# Patient Record
Sex: FEMALE | Race: WHITE | URBAN - METROPOLITAN AREA
[De-identification: names, ages, dates, MRNs, and addresses within clinical notes are randomized per-mention and may not be internally consistent; named-entity substitution may affect disease eponyms.]

---

## 2017-12-18 ENCOUNTER — NEW PATIENT (OUTPATIENT)
Dept: URBAN - METROPOLITAN AREA CLINIC 44 | Facility: CLINIC | Age: 77
End: 2017-12-18

## 2017-12-18 DIAGNOSIS — H33.301: ICD-10-CM

## 2017-12-18 DIAGNOSIS — H33.002: ICD-10-CM

## 2017-12-18 PROCEDURE — 99204 OFFICE O/P NEW MOD 45 MIN: CPT

## 2017-12-18 PROCEDURE — 92225 OPHTHALMOSCOPY (INITIAL): CPT

## 2017-12-18 ASSESSMENT — VISUAL ACUITY
OS_SC: 20/30+2
OD_SC: 20/30+2

## 2017-12-18 ASSESSMENT — TONOMETRY
OS_IOP_MMHG: 20
OD_IOP_MMHG: 18

## 2020-07-15 ENCOUNTER — IOP CHECK (OUTPATIENT)
Dept: URBAN - METROPOLITAN AREA CLINIC 79 | Facility: CLINIC | Age: 80
End: 2020-07-15

## 2020-07-15 DIAGNOSIS — H40.013: ICD-10-CM

## 2020-07-15 PROCEDURE — 92012 INTRM OPH EXAM EST PATIENT: CPT

## 2020-07-15 PROCEDURE — 92083 EXTENDED VISUAL FIELD XM: CPT

## 2020-07-15 ASSESSMENT — VISUAL ACUITY
OS_PH: 20/30
OS_SC: 20/40+1
OD_SC: 20/20-2

## 2020-07-29 ENCOUNTER — PROCEDURE ONLY (OUTPATIENT)
Dept: URBAN - METROPOLITAN AREA CLINIC 79 | Facility: CLINIC | Age: 80
End: 2020-07-29

## 2020-07-29 DIAGNOSIS — H26.492: ICD-10-CM

## 2020-07-29 PROCEDURE — 66821 AFTER CATARACT LASER SURGERY: CPT

## 2020-07-29 PROCEDURE — 92014 COMPRE OPH EXAM EST PT 1/>: CPT | Mod: 57

## 2020-07-29 ASSESSMENT — VISUAL ACUITY
OS_PH: 20/25
OS_SC: 20/40-1

## 2023-03-08 ENCOUNTER — ESTABLISHED COMPREHENSIVE EXAM (OUTPATIENT)
Dept: URBAN - METROPOLITAN AREA CLINIC 79 | Facility: CLINIC | Age: 83
End: 2023-03-08

## 2023-03-08 DIAGNOSIS — H40.013: ICD-10-CM

## 2023-03-08 DIAGNOSIS — H35.61: ICD-10-CM

## 2023-03-08 DIAGNOSIS — Z96.1: ICD-10-CM

## 2023-03-08 DIAGNOSIS — H31.092: ICD-10-CM

## 2023-03-08 PROCEDURE — 99214 OFFICE O/P EST MOD 30 MIN: CPT

## 2023-03-08 PROCEDURE — 92250 FUNDUS PHOTOGRAPHY W/I&R: CPT

## 2023-03-08 ASSESSMENT — VISUAL ACUITY
OS_SC: 20/40
OS_CC: 20/25
OD_SC: 20/25+2
OD_CC: 20/25

## 2023-03-08 ASSESSMENT — TONOMETRY
OS_IOP_MMHG: 18
OD_IOP_MMHG: 16

## 2024-01-01 ENCOUNTER — APPOINTMENT (EMERGENCY)
Dept: RADIOLOGY | Facility: HOSPITAL | Age: 84
DRG: 871 | End: 2024-01-01
Payer: COMMERCIAL

## 2024-01-01 ENCOUNTER — APPOINTMENT (EMERGENCY)
Dept: CT IMAGING | Facility: HOSPITAL | Age: 84
DRG: 871 | End: 2024-01-01
Payer: COMMERCIAL

## 2024-01-01 ENCOUNTER — HOSPICE ADMISSION (OUTPATIENT)
Dept: HOME HOSPICE | Facility: HOSPICE | Age: 84
End: 2024-01-01
Payer: MEDICARE

## 2024-01-01 ENCOUNTER — HOME CARE VISIT (OUTPATIENT)
Dept: HOME HEALTH SERVICES | Facility: HOME HEALTHCARE | Age: 84
End: 2024-01-01
Payer: MEDICARE

## 2024-01-01 ENCOUNTER — APPOINTMENT (INPATIENT)
Dept: RADIOLOGY | Facility: HOSPITAL | Age: 84
DRG: 871 | End: 2024-01-01
Payer: COMMERCIAL

## 2024-01-01 ENCOUNTER — HOSPITAL ENCOUNTER (INPATIENT)
Facility: HOSPITAL | Age: 84
LOS: 3 days | DRG: 871 | End: 2024-11-07
Attending: EMERGENCY MEDICINE | Admitting: STUDENT IN AN ORGANIZED HEALTH CARE EDUCATION/TRAINING PROGRAM
Payer: COMMERCIAL

## 2024-01-01 VITALS
BODY MASS INDEX: 23.29 KG/M2 | OXYGEN SATURATION: 91 % | HEART RATE: 104 BPM | RESPIRATION RATE: 36 BRPM | TEMPERATURE: 100 F | SYSTOLIC BLOOD PRESSURE: 106 MMHG | HEIGHT: 67 IN | DIASTOLIC BLOOD PRESSURE: 55 MMHG | WEIGHT: 148.37 LBS

## 2024-01-01 DIAGNOSIS — I48.91 ATRIAL FIBRILLATION, UNSPECIFIED TYPE (HCC): ICD-10-CM

## 2024-01-01 DIAGNOSIS — R29.90 STROKE-LIKE SYMPTOMS: Primary | ICD-10-CM

## 2024-01-01 DIAGNOSIS — J18.9 PNEUMONIA DUE TO INFECTIOUS ORGANISM, UNSPECIFIED LATERALITY, UNSPECIFIED PART OF LUNG: ICD-10-CM

## 2024-01-01 DIAGNOSIS — N17.9 ACUTE KIDNEY INJURY (HCC): ICD-10-CM

## 2024-01-01 DIAGNOSIS — E11.65 UNCONTROLLED DIABETES MELLITUS WITH HYPERGLYCEMIA (HCC): ICD-10-CM

## 2024-01-01 DIAGNOSIS — J96.01 ACUTE RESPIRATORY FAILURE WITH HYPOXIA (HCC): ICD-10-CM

## 2024-01-01 LAB
2HR DELTA HS TROPONIN: 1 NG/L
4HR DELTA HS TROPONIN: 25 NG/L
ANION GAP SERPL CALCULATED.3IONS-SCNC: 12 MMOL/L (ref 4–13)
ANION GAP SERPL CALCULATED.3IONS-SCNC: 18 MMOL/L (ref 4–13)
ANION GAP SERPL CALCULATED.3IONS-SCNC: 20 MMOL/L (ref 4–13)
APTT PPP: 39 SECONDS (ref 23–34)
ATRIAL RATE: 100 BPM
ATRIAL RATE: 127 BPM
B-OH-BUTYR SERPL-MCNC: 0.16 MMOL/L (ref 0.02–0.27)
BACTERIA UR CULT: ABNORMAL
BACTERIA UR QL AUTO: ABNORMAL /HPF
BASE EXCESS BLDA CALC-SCNC: -10 MMOL/L (ref -2–3)
BASE EXCESS BLDA CALC-SCNC: 0 MMOL/L (ref -2–3)
BASOPHILS # BLD MANUAL: 0 THOUSAND/UL (ref 0–0.1)
BASOPHILS NFR MAR MANUAL: 0 % (ref 0–1)
BILIRUB UR QL STRIP: NEGATIVE
BUN SERPL-MCNC: 43 MG/DL (ref 5–25)
BUN SERPL-MCNC: 45 MG/DL (ref 5–25)
BUN SERPL-MCNC: 46 MG/DL (ref 5–25)
CA-I BLD-SCNC: 1.25 MMOL/L (ref 1.12–1.32)
CA-I BLD-SCNC: 1.3 MMOL/L (ref 1.12–1.32)
CALCIUM SERPL-MCNC: 10 MG/DL (ref 8.4–10.2)
CALCIUM SERPL-MCNC: 10 MG/DL (ref 8.4–10.2)
CALCIUM SERPL-MCNC: 9.1 MG/DL (ref 8.4–10.2)
CARDIAC TROPONIN I PNL SERPL HS: 13 NG/L
CARDIAC TROPONIN I PNL SERPL HS: 14 NG/L
CARDIAC TROPONIN I PNL SERPL HS: 38 NG/L
CHLORIDE SERPL-SCNC: 95 MMOL/L (ref 96–108)
CHLORIDE SERPL-SCNC: 97 MMOL/L (ref 96–108)
CHLORIDE SERPL-SCNC: 99 MMOL/L (ref 96–108)
CHOLEST SERPL-MCNC: 68 MG/DL
CLARITY UR: ABNORMAL
CO2 SERPL-SCNC: 18 MMOL/L (ref 21–32)
CO2 SERPL-SCNC: 20 MMOL/L (ref 21–32)
CO2 SERPL-SCNC: 23 MMOL/L (ref 21–32)
COLOR UR: YELLOW
CREAT SERPL-MCNC: 1.84 MG/DL (ref 0.6–1.3)
CREAT SERPL-MCNC: 1.88 MG/DL (ref 0.6–1.3)
CREAT SERPL-MCNC: 1.93 MG/DL (ref 0.6–1.3)
DIGOXIN SERPL-MCNC: 1 NG/ML (ref 0.8–2)
EOSINOPHIL # BLD MANUAL: 0 THOUSAND/UL (ref 0–0.4)
EOSINOPHIL NFR BLD MANUAL: 0 % (ref 0–6)
ERYTHROCYTE [DISTWIDTH] IN BLOOD BY AUTOMATED COUNT: 13.9 % (ref 11.6–15.1)
ERYTHROCYTE [DISTWIDTH] IN BLOOD BY AUTOMATED COUNT: 14.3 % (ref 11.6–15.1)
ERYTHROCYTE [DISTWIDTH] IN BLOOD BY AUTOMATED COUNT: 14.3 % (ref 11.6–15.1)
EST. AVERAGE GLUCOSE BLD GHB EST-MCNC: 186 MG/DL
FIO2 GAS DIL.REBREATH: 40 L
FLUAV RNA RESP QL NAA+PROBE: NEGATIVE
FLUBV RNA RESP QL NAA+PROBE: NEGATIVE
GFR SERPL CREATININE-BSD FRML MDRD: 23 ML/MIN/1.73SQ M
GFR SERPL CREATININE-BSD FRML MDRD: 24 ML/MIN/1.73SQ M
GFR SERPL CREATININE-BSD FRML MDRD: 25 ML/MIN/1.73SQ M
GLUCOSE SERPL-MCNC: 250 MG/DL (ref 65–140)
GLUCOSE SERPL-MCNC: 295 MG/DL (ref 65–140)
GLUCOSE SERPL-MCNC: 322 MG/DL (ref 65–140)
GLUCOSE SERPL-MCNC: 323 MG/DL (ref 65–140)
GLUCOSE SERPL-MCNC: 380 MG/DL (ref 65–140)
GLUCOSE SERPL-MCNC: 387 MG/DL (ref 65–140)
GLUCOSE SERPL-MCNC: 447 MG/DL (ref 65–140)
GLUCOSE SERPL-MCNC: 519 MG/DL (ref 65–140)
GLUCOSE SERPL-MCNC: 521 MG/DL (ref 65–140)
GLUCOSE SERPL-MCNC: >600 MG/DL (ref 65–140)
GLUCOSE UR STRIP-MCNC: ABNORMAL MG/DL
HBA1C MFR BLD: 8.1 %
HCO3 BLDA-SCNC: 12.8 MMOL/L (ref 22–28)
HCO3 BLDA-SCNC: 22.3 MMOL/L (ref 24–30)
HCT VFR BLD AUTO: 39.3 % (ref 34.8–46.1)
HCT VFR BLD AUTO: 40.2 % (ref 34.8–46.1)
HCT VFR BLD AUTO: 45.2 % (ref 34.8–46.1)
HCT VFR BLD CALC: 36 % (ref 34.8–46.1)
HCT VFR BLD CALC: 40 % (ref 34.8–46.1)
HDLC SERPL-MCNC: 36 MG/DL
HGB BLD-MCNC: 12.8 G/DL (ref 11.5–15.4)
HGB BLD-MCNC: 13.8 G/DL (ref 11.5–15.4)
HGB BLD-MCNC: 14.2 G/DL (ref 11.5–15.4)
HGB BLDA-MCNC: 12.2 G/DL (ref 11.5–15.4)
HGB BLDA-MCNC: 13.6 G/DL (ref 11.5–15.4)
HGB UR QL STRIP.AUTO: ABNORMAL
INR PPP: 2 (ref 0.85–1.19)
KETONES UR STRIP-MCNC: ABNORMAL MG/DL
L PNEUMO1 AG UR QL IA.RAPID: NEGATIVE
LACTATE SERPL-SCNC: 7.9 MMOL/L (ref 0.5–2)
LACTATE SERPL-SCNC: 9.3 MMOL/L (ref 0.5–2)
LDLC SERPL CALC-MCNC: 5 MG/DL (ref 0–100)
LEUKOCYTE ESTERASE UR QL STRIP: ABNORMAL
LYMPHOCYTES # BLD AUTO: 0.31 THOUSAND/UL (ref 0.6–4.47)
LYMPHOCYTES # BLD AUTO: 10 % (ref 14–44)
MAGNESIUM SERPL-MCNC: 1.7 MG/DL (ref 1.9–2.7)
MAGNESIUM SERPL-MCNC: 2 MG/DL (ref 1.9–2.7)
MAGNESIUM SERPL-MCNC: 2 MG/DL (ref 1.9–2.7)
MCH RBC QN AUTO: 29.8 PG (ref 26.8–34.3)
MCH RBC QN AUTO: 30.5 PG (ref 26.8–34.3)
MCH RBC QN AUTO: 31 PG (ref 26.8–34.3)
MCHC RBC AUTO-ENTMCNC: 31.4 G/DL (ref 31.4–37.4)
MCHC RBC AUTO-ENTMCNC: 32.6 G/DL (ref 31.4–37.4)
MCHC RBC AUTO-ENTMCNC: 34.3 G/DL (ref 31.4–37.4)
MCV RBC AUTO: 90 FL (ref 82–98)
MCV RBC AUTO: 94 FL (ref 82–98)
MCV RBC AUTO: 95 FL (ref 82–98)
MONOCYTES # BLD AUTO: 0.03 THOUSAND/UL (ref 0–1.22)
MONOCYTES NFR BLD: 1 % (ref 4–12)
NEUTROPHILS # BLD MANUAL: 2.77 THOUSAND/UL (ref 1.85–7.62)
NEUTS SEG NFR BLD AUTO: 89 % (ref 43–75)
NITRITE UR QL STRIP: NEGATIVE
NON-SQ EPI CELLS URNS QL MICRO: ABNORMAL /HPF
OSMOLALITY UR/SERPL-RTO: 322 MMOL/KG (ref 282–298)
OSMOLALITY UR: 494 MMOL/KG
PCO2 BLD: 13 MMOL/L (ref 21–32)
PCO2 BLD: 20.2 MM HG (ref 36–44)
PCO2 BLD: 23 MMOL/L (ref 21–32)
PCO2 BLD: 29.7 MM HG (ref 42–50)
PH BLD: 7.41 [PH] (ref 7.35–7.45)
PH BLD: 7.49 [PH] (ref 7.3–7.4)
PH UR STRIP.AUTO: 5.5 [PH]
PHOSPHATE SERPL-MCNC: 2 MG/DL (ref 2.3–4.1)
PLATELET # BLD AUTO: 161 THOUSANDS/UL (ref 149–390)
PLATELET # BLD AUTO: 181 THOUSANDS/UL (ref 149–390)
PLATELET # BLD AUTO: 218 THOUSANDS/UL (ref 149–390)
PLATELET BLD QL SMEAR: ADEQUATE
PMV BLD AUTO: 11 FL (ref 8.9–12.7)
PMV BLD AUTO: 11 FL (ref 8.9–12.7)
PMV BLD AUTO: 11.3 FL (ref 8.9–12.7)
PO2 BLD: 39 MM HG (ref 35–45)
PO2 BLD: 84 MM HG (ref 75–129)
POTASSIUM BLD-SCNC: 3.9 MMOL/L (ref 3.5–5.3)
POTASSIUM BLD-SCNC: 4.9 MMOL/L (ref 3.5–5.3)
POTASSIUM SERPL-SCNC: 3.6 MMOL/L (ref 3.5–5.3)
POTASSIUM SERPL-SCNC: 4.5 MMOL/L (ref 3.5–5.3)
POTASSIUM SERPL-SCNC: 4.8 MMOL/L (ref 3.5–5.3)
PROCALCITONIN SERPL-MCNC: 11.69 NG/ML
PROCALCITONIN SERPL-MCNC: 9.13 NG/ML
PROT UR STRIP-MCNC: ABNORMAL MG/DL
PROTHROMBIN TIME: 23.1 SECONDS (ref 12.3–15)
QRS AXIS: 90 DEGREES
QRS AXIS: 92 DEGREES
QRSD INTERVAL: 92 MS
QRSD INTERVAL: 98 MS
QT INTERVAL: 274 MS
QT INTERVAL: 344 MS
QTC INTERVAL: 405 MS
QTC INTERVAL: 511 MS
RBC # BLD AUTO: 4.2 MILLION/UL (ref 3.81–5.12)
RBC # BLD AUTO: 4.45 MILLION/UL (ref 3.81–5.12)
RBC # BLD AUTO: 4.77 MILLION/UL (ref 3.81–5.12)
RBC #/AREA URNS AUTO: ABNORMAL /HPF
RBC MORPH BLD: NORMAL
RSV RNA RESP QL NAA+PROBE: NEGATIVE
S PNEUM AG UR QL: NEGATIVE
SAO2 % BLD FROM PO2: 79 % (ref 60–85)
SAO2 % BLD FROM PO2: 97 % (ref 60–85)
SARS-COV-2 RNA RESP QL NAA+PROBE: NEGATIVE
SODIUM 24H UR-SCNC: 25 MOL/L
SODIUM BLD-SCNC: 133 MMOL/L (ref 136–145)
SODIUM BLD-SCNC: 134 MMOL/L (ref 136–145)
SODIUM SERPL-SCNC: 130 MMOL/L (ref 135–147)
SODIUM SERPL-SCNC: 135 MMOL/L (ref 135–147)
SODIUM SERPL-SCNC: 137 MMOL/L (ref 135–147)
SP GR UR STRIP.AUTO: 1.02 (ref 1–1.03)
SPECIMEN SOURCE: ABNORMAL
SPECIMEN SOURCE: ABNORMAL
T WAVE AXIS: -57 DEGREES
T WAVE AXIS: -65 DEGREES
TRIGL SERPL-MCNC: 135 MG/DL
TSH SERPL DL<=0.05 MIU/L-ACNC: 3.03 UIU/ML (ref 0.45–4.5)
URATE SERPL-MCNC: 8.8 MG/DL (ref 2–7.5)
UROBILINOGEN UR STRIP-ACNC: <2 MG/DL
VENTRICULAR RATE: 132 BPM
VENTRICULAR RATE: 133 BPM
WBC # BLD AUTO: 16.53 THOUSAND/UL (ref 4.31–10.16)
WBC # BLD AUTO: 20.96 THOUSAND/UL (ref 4.31–10.16)
WBC # BLD AUTO: 3.11 THOUSAND/UL (ref 4.31–10.16)
WBC #/AREA URNS AUTO: ABNORMAL /HPF

## 2024-01-01 PROCEDURE — 71045 X-RAY EXAM CHEST 1 VIEW: CPT

## 2024-01-01 PROCEDURE — 82948 REAGENT STRIP/BLOOD GLUCOSE: CPT

## 2024-01-01 PROCEDURE — 0241U HB NFCT DS VIR RESP RNA 4 TRGT: CPT | Performed by: INTERNAL MEDICINE

## 2024-01-01 PROCEDURE — 99285 EMERGENCY DEPT VISIT HI MDM: CPT

## 2024-01-01 PROCEDURE — 87449 NOS EACH ORGANISM AG IA: CPT | Performed by: INTERNAL MEDICINE

## 2024-01-01 PROCEDURE — 82947 ASSAY GLUCOSE BLOOD QUANT: CPT

## 2024-01-01 PROCEDURE — 84443 ASSAY THYROID STIM HORMONE: CPT | Performed by: INTERNAL MEDICINE

## 2024-01-01 PROCEDURE — 94760 N-INVAS EAR/PLS OXIMETRY 1: CPT

## 2024-01-01 PROCEDURE — 70498 CT ANGIOGRAPHY NECK: CPT

## 2024-01-01 PROCEDURE — 84145 PROCALCITONIN (PCT): CPT | Performed by: EMERGENCY MEDICINE

## 2024-01-01 PROCEDURE — 84300 ASSAY OF URINE SODIUM: CPT | Performed by: NURSE PRACTITIONER

## 2024-01-01 PROCEDURE — 82803 BLOOD GASES ANY COMBINATION: CPT

## 2024-01-01 PROCEDURE — 87086 URINE CULTURE/COLONY COUNT: CPT | Performed by: NURSE PRACTITIONER

## 2024-01-01 PROCEDURE — 99239 HOSP IP/OBS DSCHRG MGMT >30: CPT

## 2024-01-01 PROCEDURE — 83605 ASSAY OF LACTIC ACID: CPT | Performed by: INTERNAL MEDICINE

## 2024-01-01 PROCEDURE — 85014 HEMATOCRIT: CPT

## 2024-01-01 PROCEDURE — 82330 ASSAY OF CALCIUM: CPT

## 2024-01-01 PROCEDURE — 84145 PROCALCITONIN (PCT): CPT | Performed by: INTERNAL MEDICINE

## 2024-01-01 PROCEDURE — 80162 ASSAY OF DIGOXIN TOTAL: CPT | Performed by: NURSE PRACTITIONER

## 2024-01-01 PROCEDURE — 84295 ASSAY OF SERUM SODIUM: CPT

## 2024-01-01 PROCEDURE — 83930 ASSAY OF BLOOD OSMOLALITY: CPT | Performed by: INTERNAL MEDICINE

## 2024-01-01 PROCEDURE — 85027 COMPLETE CBC AUTOMATED: CPT | Performed by: NURSE PRACTITIONER

## 2024-01-01 PROCEDURE — 82010 KETONE BODYS QUAN: CPT | Performed by: EMERGENCY MEDICINE

## 2024-01-01 PROCEDURE — 83935 ASSAY OF URINE OSMOLALITY: CPT | Performed by: NURSE PRACTITIONER

## 2024-01-01 PROCEDURE — 84550 ASSAY OF BLOOD/URIC ACID: CPT | Performed by: INTERNAL MEDICINE

## 2024-01-01 PROCEDURE — 84132 ASSAY OF SERUM POTASSIUM: CPT

## 2024-01-01 PROCEDURE — 84484 ASSAY OF TROPONIN QUANT: CPT | Performed by: NURSE PRACTITIONER

## 2024-01-01 PROCEDURE — 99223 1ST HOSP IP/OBS HIGH 75: CPT | Performed by: INTERNAL MEDICINE

## 2024-01-01 PROCEDURE — 85027 COMPLETE CBC AUTOMATED: CPT | Performed by: INTERNAL MEDICINE

## 2024-01-01 PROCEDURE — 87186 SC STD MICRODIL/AGAR DIL: CPT | Performed by: NURSE PRACTITIONER

## 2024-01-01 PROCEDURE — 85007 BL SMEAR W/DIFF WBC COUNT: CPT | Performed by: INTERNAL MEDICINE

## 2024-01-01 PROCEDURE — 83735 ASSAY OF MAGNESIUM: CPT | Performed by: NURSE PRACTITIONER

## 2024-01-01 PROCEDURE — 36415 COLL VENOUS BLD VENIPUNCTURE: CPT | Performed by: EMERGENCY MEDICINE

## 2024-01-01 PROCEDURE — 80061 LIPID PANEL: CPT | Performed by: INTERNAL MEDICINE

## 2024-01-01 PROCEDURE — 93005 ELECTROCARDIOGRAM TRACING: CPT

## 2024-01-01 PROCEDURE — 87077 CULTURE AEROBIC IDENTIFY: CPT | Performed by: NURSE PRACTITIONER

## 2024-01-01 PROCEDURE — 84484 ASSAY OF TROPONIN QUANT: CPT | Performed by: INTERNAL MEDICINE

## 2024-01-01 PROCEDURE — 36600 WITHDRAWAL OF ARTERIAL BLOOD: CPT

## 2024-01-01 PROCEDURE — 99285 EMERGENCY DEPT VISIT HI MDM: CPT | Performed by: EMERGENCY MEDICINE

## 2024-01-01 PROCEDURE — 84100 ASSAY OF PHOSPHORUS: CPT | Performed by: NURSE PRACTITIONER

## 2024-01-01 PROCEDURE — 85730 THROMBOPLASTIN TIME PARTIAL: CPT | Performed by: EMERGENCY MEDICINE

## 2024-01-01 PROCEDURE — 83735 ASSAY OF MAGNESIUM: CPT | Performed by: INTERNAL MEDICINE

## 2024-01-01 PROCEDURE — 83036 HEMOGLOBIN GLYCOSYLATED A1C: CPT | Performed by: INTERNAL MEDICINE

## 2024-01-01 PROCEDURE — 99232 SBSQ HOSP IP/OBS MODERATE 35: CPT | Performed by: INTERNAL MEDICINE

## 2024-01-01 PROCEDURE — 81001 URINALYSIS AUTO W/SCOPE: CPT | Performed by: NURSE PRACTITIONER

## 2024-01-01 PROCEDURE — 94002 VENT MGMT INPAT INIT DAY: CPT

## 2024-01-01 PROCEDURE — 85610 PROTHROMBIN TIME: CPT | Performed by: EMERGENCY MEDICINE

## 2024-01-01 PROCEDURE — 93010 ELECTROCARDIOGRAM REPORT: CPT | Performed by: INTERNAL MEDICINE

## 2024-01-01 PROCEDURE — 80048 BASIC METABOLIC PNL TOTAL CA: CPT | Performed by: EMERGENCY MEDICINE

## 2024-01-01 PROCEDURE — 87040 BLOOD CULTURE FOR BACTERIA: CPT | Performed by: INTERNAL MEDICINE

## 2024-01-01 PROCEDURE — 99291 CRITICAL CARE FIRST HOUR: CPT | Performed by: INTERNAL MEDICINE

## 2024-01-01 PROCEDURE — 80048 BASIC METABOLIC PNL TOTAL CA: CPT | Performed by: INTERNAL MEDICINE

## 2024-01-01 PROCEDURE — 84484 ASSAY OF TROPONIN QUANT: CPT | Performed by: EMERGENCY MEDICINE

## 2024-01-01 PROCEDURE — 70496 CT ANGIOGRAPHY HEAD: CPT

## 2024-01-01 PROCEDURE — 85027 COMPLETE CBC AUTOMATED: CPT | Performed by: EMERGENCY MEDICINE

## 2024-01-01 PROCEDURE — 80048 BASIC METABOLIC PNL TOTAL CA: CPT | Performed by: NURSE PRACTITIONER

## 2024-01-01 RX ORDER — DILTIAZEM HYDROCHLORIDE 5 MG/ML
15 INJECTION INTRAVENOUS ONCE
Status: COMPLETED | OUTPATIENT
Start: 2024-01-01 | End: 2024-01-01

## 2024-01-01 RX ORDER — INSULIN LISPRO 100 [IU]/ML
1-5 INJECTION, SOLUTION INTRAVENOUS; SUBCUTANEOUS EVERY 6 HOURS SCHEDULED
Status: DISCONTINUED | OUTPATIENT
Start: 2024-01-01 | End: 2024-01-01

## 2024-01-01 RX ORDER — DIGOXIN 125 MCG
187.5 TABLET ORAL DAILY
Status: DISCONTINUED | OUTPATIENT
Start: 2024-01-01 | End: 2024-01-01

## 2024-01-01 RX ORDER — ATORVASTATIN CALCIUM 40 MG/1
40 TABLET, FILM COATED ORAL EVERY EVENING
Status: DISCONTINUED | OUTPATIENT
Start: 2024-01-01 | End: 2024-01-01

## 2024-01-01 RX ORDER — INSULIN LISPRO 100 [IU]/ML
1-6 INJECTION, SOLUTION INTRAVENOUS; SUBCUTANEOUS
Status: DISCONTINUED | OUTPATIENT
Start: 2024-01-01 | End: 2024-01-01

## 2024-01-01 RX ORDER — ASPIRIN 81 MG/1
81 TABLET, CHEWABLE ORAL DAILY
Status: DISCONTINUED | OUTPATIENT
Start: 2024-01-01 | End: 2024-01-01

## 2024-01-01 RX ORDER — SODIUM CHLORIDE, SODIUM GLUCONATE, SODIUM ACETATE, POTASSIUM CHLORIDE, MAGNESIUM CHLORIDE, SODIUM PHOSPHATE, DIBASIC, AND POTASSIUM PHOSPHATE .53; .5; .37; .037; .03; .012; .00082 G/100ML; G/100ML; G/100ML; G/100ML; G/100ML; G/100ML; G/100ML
75 INJECTION, SOLUTION INTRAVENOUS CONTINUOUS
Status: DISCONTINUED | OUTPATIENT
Start: 2024-01-01 | End: 2024-01-01

## 2024-01-01 RX ORDER — CEFTRIAXONE 2 G/50ML
2000 INJECTION, SOLUTION INTRAVENOUS ONCE
Status: COMPLETED | OUTPATIENT
Start: 2024-01-01 | End: 2024-01-01

## 2024-01-01 RX ORDER — SODIUM CHLORIDE 9 MG/ML
75 INJECTION, SOLUTION INTRAVENOUS CONTINUOUS
Status: DISCONTINUED | OUTPATIENT
Start: 2024-01-01 | End: 2024-01-01

## 2024-01-01 RX ORDER — SODIUM CHLORIDE, SODIUM GLUCONATE, SODIUM ACETATE, POTASSIUM CHLORIDE, MAGNESIUM CHLORIDE, SODIUM PHOSPHATE, DIBASIC, AND POTASSIUM PHOSPHATE .53; .5; .37; .037; .03; .012; .00082 G/100ML; G/100ML; G/100ML; G/100ML; G/100ML; G/100ML; G/100ML
1000 INJECTION, SOLUTION INTRAVENOUS ONCE
Status: COMPLETED | OUTPATIENT
Start: 2024-01-01 | End: 2024-01-01

## 2024-01-01 RX ORDER — HYDROMORPHONE HCL/PF 1 MG/ML
0.3 SYRINGE (ML) INJECTION
Status: DISCONTINUED | OUTPATIENT
Start: 2024-01-01 | End: 2024-01-01 | Stop reason: HOSPADM

## 2024-01-01 RX ORDER — ALBUMIN HUMAN 50 G/1000ML
25 SOLUTION INTRAVENOUS ONCE
Status: COMPLETED | OUTPATIENT
Start: 2024-01-01 | End: 2024-01-01

## 2024-01-01 RX ORDER — VANCOMYCIN HYDROCHLORIDE 750 MG/150ML
12.5 INJECTION, SOLUTION INTRAVENOUS ONCE AS NEEDED
Status: DISCONTINUED | OUTPATIENT
Start: 2024-01-01 | End: 2024-01-01

## 2024-01-01 RX ORDER — CEFEPIME HYDROCHLORIDE 2 G/50ML
2000 INJECTION, SOLUTION INTRAVENOUS EVERY 24 HOURS
Status: DISCONTINUED | OUTPATIENT
Start: 2024-01-01 | End: 2024-01-01

## 2024-01-01 RX ORDER — LORAZEPAM 2 MG/ML
1 INJECTION INTRAMUSCULAR
Status: DISCONTINUED | OUTPATIENT
Start: 2024-01-01 | End: 2024-01-01 | Stop reason: HOSPADM

## 2024-01-01 RX ORDER — CEFTRIAXONE 1 G/50ML
1000 INJECTION, SOLUTION INTRAVENOUS EVERY 24 HOURS
Status: DISCONTINUED | OUTPATIENT
Start: 2024-01-01 | End: 2024-01-01

## 2024-01-01 RX ORDER — HALOPERIDOL 5 MG/ML
0.5 INJECTION INTRAMUSCULAR EVERY 2 HOUR PRN
Status: DISCONTINUED | OUTPATIENT
Start: 2024-01-01 | End: 2024-01-01 | Stop reason: HOSPADM

## 2024-01-01 RX ORDER — HYDROMORPHONE HCL/PF 1 MG/ML
0.5 SYRINGE (ML) INJECTION ONCE AS NEEDED
Status: COMPLETED | OUTPATIENT
Start: 2024-01-01 | End: 2024-01-01

## 2024-01-01 RX ORDER — INSULIN LISPRO 100 [IU]/ML
10 INJECTION, SOLUTION INTRAVENOUS; SUBCUTANEOUS ONCE
Status: DISCONTINUED | OUTPATIENT
Start: 2024-01-01 | End: 2024-01-01

## 2024-01-01 RX ORDER — ACETAMINOPHEN 10 MG/ML
1000 INJECTION, SOLUTION INTRAVENOUS EVERY 6 HOURS PRN
Status: DISCONTINUED | OUTPATIENT
Start: 2024-01-01 | End: 2024-01-01

## 2024-01-01 RX ORDER — ACETAMINOPHEN 10 MG/ML
1000 INJECTION, SOLUTION INTRAVENOUS EVERY 8 HOURS PRN
Status: DISCONTINUED | OUTPATIENT
Start: 2024-01-01 | End: 2024-01-01

## 2024-01-01 RX ORDER — MAGNESIUM SULFATE 1 G/100ML
1 INJECTION INTRAVENOUS ONCE
Status: COMPLETED | OUTPATIENT
Start: 2024-01-01 | End: 2024-01-01

## 2024-01-01 RX ORDER — HYDROMORPHONE HCL/PF 1 MG/ML
0.3 SYRINGE (ML) INJECTION EVERY 2 HOUR PRN
Status: DISCONTINUED | OUTPATIENT
Start: 2024-01-01 | End: 2024-01-01

## 2024-01-01 RX ORDER — ALBUMIN HUMAN 50 G/1000ML
SOLUTION INTRAVENOUS
Status: COMPLETED
Start: 2024-01-01 | End: 2024-01-01

## 2024-01-01 RX ADMIN — DILTIAZEM HYDROCHLORIDE 15 MG: 5 INJECTION INTRAVENOUS at 21:12

## 2024-01-01 RX ADMIN — VANCOMYCIN HYDROCHLORIDE 1750 MG: 1 INJECTION, POWDER, LYOPHILIZED, FOR SOLUTION INTRAVENOUS at 04:02

## 2024-01-01 RX ADMIN — AMIODARONE HYDROCHLORIDE 1 MG/MIN: 50 INJECTION, SOLUTION INTRAVENOUS at 03:41

## 2024-01-01 RX ADMIN — HYDROMORPHONE HYDROCHLORIDE 0.3 MG: 1 INJECTION, SOLUTION INTRAMUSCULAR; INTRAVENOUS; SUBCUTANEOUS at 05:35

## 2024-01-01 RX ADMIN — DEXTROSE 150 MG: 50 INJECTION, SOLUTION INTRAVENOUS at 03:27

## 2024-01-01 RX ADMIN — HYDROMORPHONE HYDROCHLORIDE 0.3 MG: 1 INJECTION, SOLUTION INTRAMUSCULAR; INTRAVENOUS; SUBCUTANEOUS at 02:57

## 2024-01-01 RX ADMIN — HYDROMORPHONE HYDROCHLORIDE 0.3 MG: 1 INJECTION, SOLUTION INTRAMUSCULAR; INTRAVENOUS; SUBCUTANEOUS at 11:09

## 2024-01-01 RX ADMIN — HALOPERIDOL LACTATE 0.5 MG: 5 INJECTION, SOLUTION INTRAMUSCULAR at 09:24

## 2024-01-01 RX ADMIN — ACETAMINOPHEN 1000 MG: 10 INJECTION INTRAVENOUS at 03:13

## 2024-01-01 RX ADMIN — HYDROMORPHONE HYDROCHLORIDE 0.3 MG: 1 INJECTION, SOLUTION INTRAMUSCULAR; INTRAVENOUS; SUBCUTANEOUS at 23:50

## 2024-01-01 RX ADMIN — INSULIN LISPRO 6 UNITS: 100 INJECTION, SOLUTION INTRAVENOUS; SUBCUTANEOUS at 00:14

## 2024-01-01 RX ADMIN — LORAZEPAM 1 MG: 2 INJECTION INTRAMUSCULAR; INTRAVENOUS at 18:00

## 2024-01-01 RX ADMIN — LORAZEPAM 1 MG: 2 INJECTION INTRAMUSCULAR; INTRAVENOUS at 14:59

## 2024-01-01 RX ADMIN — CEFTRIAXONE 2000 MG: 2 INJECTION, SOLUTION INTRAVENOUS at 00:01

## 2024-01-01 RX ADMIN — SODIUM CHLORIDE 75 ML/HR: 0.9 INJECTION, SOLUTION INTRAVENOUS at 23:55

## 2024-01-01 RX ADMIN — SODIUM CHLORIDE, SODIUM GLUCONATE, SODIUM ACETATE, POTASSIUM CHLORIDE, MAGNESIUM CHLORIDE, SODIUM PHOSPHATE, DIBASIC, AND POTASSIUM PHOSPHATE 1000 ML: .53; .5; .37; .037; .03; .012; .00082 INJECTION, SOLUTION INTRAVENOUS at 05:22

## 2024-01-01 RX ADMIN — SODIUM CHLORIDE 500 ML: 0.9 INJECTION, SOLUTION INTRAVENOUS at 21:14

## 2024-01-01 RX ADMIN — LORAZEPAM 1 MG: 2 INJECTION INTRAMUSCULAR; INTRAVENOUS at 09:38

## 2024-01-01 RX ADMIN — HALOPERIDOL LACTATE 0.5 MG: 5 INJECTION, SOLUTION INTRAMUSCULAR at 17:57

## 2024-01-01 RX ADMIN — MAGNESIUM SULFATE HEPTAHYDRATE 1 G: 1 INJECTION, SOLUTION INTRAVENOUS at 21:17

## 2024-01-01 RX ADMIN — HYDROMORPHONE HYDROCHLORIDE 0.3 MG: 1 INJECTION, SOLUTION INTRAMUSCULAR; INTRAVENOUS; SUBCUTANEOUS at 10:25

## 2024-01-01 RX ADMIN — HYDROMORPHONE HYDROCHLORIDE 0.3 MG: 1 INJECTION, SOLUTION INTRAMUSCULAR; INTRAVENOUS; SUBCUTANEOUS at 13:09

## 2024-01-01 RX ADMIN — NOREPINEPHRINE BITARTRATE 4 MCG/MIN: 1 SOLUTION INTRAVENOUS at 06:18

## 2024-01-01 RX ADMIN — INSULIN LISPRO 3 UNITS: 100 INJECTION, SOLUTION INTRAVENOUS; SUBCUTANEOUS at 03:58

## 2024-01-01 RX ADMIN — SODIUM CHLORIDE, SODIUM GLUCONATE, SODIUM ACETATE, POTASSIUM CHLORIDE, MAGNESIUM CHLORIDE, SODIUM PHOSPHATE, DIBASIC, AND POTASSIUM PHOSPHATE 1000 ML: .53; .5; .37; .037; .03; .012; .00082 INJECTION, SOLUTION INTRAVENOUS at 03:06

## 2024-01-01 RX ADMIN — SODIUM CHLORIDE, SODIUM GLUCONATE, SODIUM ACETATE, POTASSIUM CHLORIDE, MAGNESIUM CHLORIDE, SODIUM PHOSPHATE, DIBASIC, AND POTASSIUM PHOSPHATE 75 ML/HR: .53; .5; .37; .037; .03; .012; .00082 INJECTION, SOLUTION INTRAVENOUS at 04:09

## 2024-01-01 RX ADMIN — INSULIN LISPRO 2 UNITS: 100 INJECTION, SOLUTION INTRAVENOUS; SUBCUTANEOUS at 05:59

## 2024-01-01 RX ADMIN — ALBUMIN (HUMAN) 25 G: 12.5 INJECTION, SOLUTION INTRAVENOUS at 05:21

## 2024-01-01 RX ADMIN — HYDROMORPHONE HYDROCHLORIDE 0.3 MG: 1 INJECTION, SOLUTION INTRAMUSCULAR; INTRAVENOUS; SUBCUTANEOUS at 20:26

## 2024-01-01 RX ADMIN — HYDROMORPHONE HYDROCHLORIDE 0.3 MG: 1 INJECTION, SOLUTION INTRAMUSCULAR; INTRAVENOUS; SUBCUTANEOUS at 00:31

## 2024-01-01 RX ADMIN — HYDROMORPHONE HYDROCHLORIDE 0.3 MG: 1 INJECTION, SOLUTION INTRAMUSCULAR; INTRAVENOUS; SUBCUTANEOUS at 13:57

## 2024-01-01 RX ADMIN — IOHEXOL 75 ML: 350 INJECTION, SOLUTION INTRAVENOUS at 20:18

## 2024-01-01 RX ADMIN — HYDROMORPHONE HYDROCHLORIDE 0.3 MG: 1 INJECTION, SOLUTION INTRAMUSCULAR; INTRAVENOUS; SUBCUTANEOUS at 17:53

## 2024-01-01 RX ADMIN — HYDROMORPHONE HYDROCHLORIDE 0.5 MG: 1 INJECTION, SOLUTION INTRAMUSCULAR; INTRAVENOUS; SUBCUTANEOUS at 09:15

## 2024-01-01 RX ADMIN — LORAZEPAM 1 MG: 2 INJECTION INTRAMUSCULAR; INTRAVENOUS at 10:32

## 2024-01-01 RX ADMIN — ALBUMIN HUMAN 25 G: 50 SOLUTION INTRAVENOUS at 05:21

## 2024-02-24 ENCOUNTER — HOSPITAL ENCOUNTER (INPATIENT)
Dept: HOSPITAL 99 - 2 NORTH | Age: 84
LOS: 2 days | Discharge: NURSING, REHAB OR LONG-TERM CARE | DRG: 641 | End: 2024-02-26
Payer: COMMERCIAL

## 2024-02-24 VITALS — SYSTOLIC BLOOD PRESSURE: 115 MMHG | DIASTOLIC BLOOD PRESSURE: 100 MMHG | RESPIRATION RATE: 15 BRPM

## 2024-02-24 VITALS — DIASTOLIC BLOOD PRESSURE: 91 MMHG | SYSTOLIC BLOOD PRESSURE: 159 MMHG

## 2024-02-24 VITALS — RESPIRATION RATE: 11 BRPM

## 2024-02-24 VITALS — SYSTOLIC BLOOD PRESSURE: 123 MMHG | RESPIRATION RATE: 20 BRPM | DIASTOLIC BLOOD PRESSURE: 74 MMHG

## 2024-02-24 VITALS — BODY MASS INDEX: 27.8 KG/M2 | BODY MASS INDEX: 27 KG/M2 | BODY MASS INDEX: 28 KG/M2 | BODY MASS INDEX: 28.4 KG/M2

## 2024-02-24 VITALS — RESPIRATION RATE: 16 BRPM

## 2024-02-24 VITALS — HEART RATE: 85 BPM | DIASTOLIC BLOOD PRESSURE: 65 MMHG | RESPIRATION RATE: 18 BRPM | SYSTOLIC BLOOD PRESSURE: 119 MMHG

## 2024-02-24 VITALS — HEART RATE: 90 BPM | DIASTOLIC BLOOD PRESSURE: 65 MMHG | SYSTOLIC BLOOD PRESSURE: 119 MMHG

## 2024-02-24 VITALS — RESPIRATION RATE: 16 BRPM | DIASTOLIC BLOOD PRESSURE: 74 MMHG | SYSTOLIC BLOOD PRESSURE: 118 MMHG

## 2024-02-24 VITALS — DIASTOLIC BLOOD PRESSURE: 53 MMHG | SYSTOLIC BLOOD PRESSURE: 109 MMHG | RESPIRATION RATE: 18 BRPM

## 2024-02-24 VITALS — HEART RATE: 85 BPM | SYSTOLIC BLOOD PRESSURE: 119 MMHG | DIASTOLIC BLOOD PRESSURE: 66 MMHG

## 2024-02-24 VITALS — RESPIRATION RATE: 12 BRPM

## 2024-02-24 VITALS — RESPIRATION RATE: 14 BRPM

## 2024-02-24 VITALS — DIASTOLIC BLOOD PRESSURE: 74 MMHG | RESPIRATION RATE: 16 BRPM | SYSTOLIC BLOOD PRESSURE: 118 MMHG

## 2024-02-24 VITALS — DIASTOLIC BLOOD PRESSURE: 87 MMHG | RESPIRATION RATE: 20 BRPM | SYSTOLIC BLOOD PRESSURE: 137 MMHG

## 2024-02-24 VITALS — SYSTOLIC BLOOD PRESSURE: 159 MMHG | RESPIRATION RATE: 16 BRPM | DIASTOLIC BLOOD PRESSURE: 91 MMHG

## 2024-02-24 VITALS — RESPIRATION RATE: 15 BRPM | SYSTOLIC BLOOD PRESSURE: 134 MMHG | DIASTOLIC BLOOD PRESSURE: 64 MMHG

## 2024-02-24 VITALS — SYSTOLIC BLOOD PRESSURE: 126 MMHG | RESPIRATION RATE: 18 BRPM | DIASTOLIC BLOOD PRESSURE: 67 MMHG

## 2024-02-24 VITALS — RESPIRATION RATE: 18 BRPM

## 2024-02-24 VITALS — RESPIRATION RATE: 23 BRPM

## 2024-02-24 VITALS — RESPIRATION RATE: 21 BRPM

## 2024-02-24 VITALS — RESPIRATION RATE: 19 BRPM

## 2024-02-24 VITALS — RESPIRATION RATE: 13 BRPM

## 2024-02-24 VITALS — RESPIRATION RATE: 18 BRPM | DIASTOLIC BLOOD PRESSURE: 70 MMHG | SYSTOLIC BLOOD PRESSURE: 123 MMHG

## 2024-02-24 DIAGNOSIS — T50.2X5A: ICD-10-CM

## 2024-02-24 DIAGNOSIS — E11.36: ICD-10-CM

## 2024-02-24 DIAGNOSIS — F03.90: ICD-10-CM

## 2024-02-24 DIAGNOSIS — I12.9: ICD-10-CM

## 2024-02-24 DIAGNOSIS — I27.20: ICD-10-CM

## 2024-02-24 DIAGNOSIS — E86.1: ICD-10-CM

## 2024-02-24 DIAGNOSIS — E87.1: Primary | ICD-10-CM

## 2024-02-24 DIAGNOSIS — Z79.01: ICD-10-CM

## 2024-02-24 DIAGNOSIS — E87.6: ICD-10-CM

## 2024-02-24 DIAGNOSIS — E86.0: ICD-10-CM

## 2024-02-24 DIAGNOSIS — E87.3: ICD-10-CM

## 2024-02-24 DIAGNOSIS — I48.21: ICD-10-CM

## 2024-02-24 DIAGNOSIS — E22.2: ICD-10-CM

## 2024-02-24 DIAGNOSIS — E11.22: ICD-10-CM

## 2024-02-24 DIAGNOSIS — E03.9: ICD-10-CM

## 2024-02-24 DIAGNOSIS — N18.2: ICD-10-CM

## 2024-02-24 LAB
ALBUMIN SERPL-MCNC: 3.4 G/DL (ref 3.5–5)
ALP SERPL-CCNC: 79 U/L (ref 38–126)
ALT SERPL-CCNC: 22 U/L (ref 0–35)
AST SERPL-CCNC: 31 U/L (ref 14–36)
BUN SERPL-MCNC: 27 MG/DL (ref 7–17)
BUN SERPL-MCNC: 35 MG/DL (ref 7–17)
CALCIUM SERPL-MCNC: 10.1 MG/DL (ref 8.4–10.2)
CALCIUM SERPL-MCNC: 9.2 MG/DL (ref 8.4–10.2)
CHLORIDE SERPL-SCNC: 81 MMOL/L (ref 98–107)
CHLORIDE SERPL-SCNC: 85 MMOL/L (ref 98–107)
CO2 SERPL-SCNC: 32 MMOL/L (ref 22–30)
CO2 SERPL-SCNC: 33 MMOL/L (ref 22–30)
CORTIS SERPL-MCNC: 24.2 UG/DL
DIGOXIN SERPL-MCNC: 0.6 NG/ML (ref 0.8–2)
EGFR: > 60
EGFR: > 60
ERYTHROCYTE [DISTWIDTH] IN BLOOD BY AUTOMATED COUNT: 12.9 % (ref 11.5–14.5)
ERYTHROCYTE [DISTWIDTH] IN BLOOD BY AUTOMATED COUNT: 13.1 % (ref 11.5–14.5)
ESTIMATED CREATININE CLEARANCE: 54 ML/MIN
ESTIMATED CREATININE CLEARANCE: 65 ML/MIN
GLUCOSE - POINT OF CARE: 191 MG/DL (ref 70–99)
GLUCOSE - POINT OF CARE: 211 MG/DL (ref 70–99)
GLUCOSE - POINT OF CARE: 229 MG/DL (ref 70–99)
GLUCOSE - POINT OF CARE: 235 MG/DL (ref 70–99)
GLUCOSE SERPL-MCNC: 177 MG/DL (ref 70–99)
GLUCOSE SERPL-MCNC: 240 MG/DL (ref 70–99)
HBA1C MFR BLD: 8.2 % (ref 4–5.6)
HCT VFR BLD AUTO: 34.4 % (ref 37–47)
HCT VFR BLD AUTO: 37.1 % (ref 37–47)
HGB BLD-MCNC: 12.8 G/DL (ref 12–16)
HGB BLD-MCNC: 13.8 G/DL (ref 12–16)
MAGNESIUM SERPL-MCNC: 1.6 MG/DL (ref 1.6–2.3)
MCHC RBC AUTO-ENTMCNC: 37.2 G/DL (ref 33–37)
MCHC RBC AUTO-ENTMCNC: 37.2 G/DL (ref 33–37)
MCV RBC AUTO: 84.7 FL (ref 81–99)
MCV RBC AUTO: 85.4 FL (ref 81–99)
NRBC BLD AUTO-RTO: 0 %
PLATELET # BLD AUTO: 247 10^3/UL (ref 130–400)
PLATELET # BLD AUTO: 251 10^3/UL (ref 130–400)
POTASSIUM SERPL-SCNC: 3 MMOL/L (ref 3.5–5.1)
POTASSIUM SERPL-SCNC: 3.4 MMOL/L (ref 3.5–5.1)
POTASSIUM SERPL-SCNC: 3.7 MMOL/L (ref 3.5–5.1)
PROT SERPL-MCNC: 6.3 G/DL (ref 6.3–8.2)
SODIUM SERPL-SCNC: 121 MMOL/L (ref 135–145)
SODIUM SERPL-SCNC: 121 MMOL/L (ref 135–145)
SODIUM SERPL-SCNC: 122 MMOL/L (ref 135–145)
SODIUM SERPL-SCNC: 122 MMOL/L (ref 135–145)
SODIUM SERPL-SCNC: 125 MMOL/L (ref 135–145)
TROPONIN I SERPL-MCNC: 0.01 NG/ML

## 2024-02-24 RX ADMIN — SODIUM CHLORIDE 250: 3 INJECTION, SOLUTION INTRAVENOUS at 23:36

## 2024-02-24 RX ADMIN — MAGNESIUM SULFATE HEPTAHYDRATE 50: 2 INJECTION, SOLUTION INTRAVENOUS at 16:36

## 2024-02-24 RX ADMIN — METOPROLOL SUCCINATE 50 MG: 50 TABLET, EXTENDED RELEASE ORAL at 08:19

## 2024-02-24 RX ADMIN — Medication 50 MCG: at 08:18

## 2024-02-24 RX ADMIN — POTASSIUM CHLORIDE 40 MEQ: 1500 TABLET, EXTENDED RELEASE ORAL at 10:58

## 2024-02-24 RX ADMIN — DIGOXIN 125 MCG: 125 TABLET ORAL at 12:43

## 2024-02-24 RX ADMIN — INSULIN ASPART 2 UNITS: 100 INJECTION, SOLUTION INTRAVENOUS; SUBCUTANEOUS at 13:31

## 2024-02-24 RX ADMIN — FAMOTIDINE 40 MG: 40 TABLET ORAL at 21:39

## 2024-02-24 RX ADMIN — INSULIN ASPART 2 UNITS: 100 INJECTION, SOLUTION INTRAVENOUS; SUBCUTANEOUS at 09:40

## 2024-02-24 RX ADMIN — INSULIN ASPART 1 UNITS: 100 INJECTION, SOLUTION INTRAVENOUS; SUBCUTANEOUS at 17:23

## 2024-02-24 RX ADMIN — APIXABAN 5 MG: 5 TABLET, FILM COATED ORAL at 08:18

## 2024-02-24 RX ADMIN — SODIUM CHLORIDE 250: 3 INJECTION, SOLUTION INTRAVENOUS at 13:32

## 2024-02-24 RX ADMIN — LISINOPRIL 40 MG: 20 TABLET ORAL at 08:18

## 2024-02-24 RX ADMIN — LEVOTHYROXINE SODIUM 50 MCG: 0.05 TABLET ORAL at 05:51

## 2024-02-24 RX ADMIN — APIXABAN 5 MG: 5 TABLET, FILM COATED ORAL at 20:20

## 2024-02-24 RX ADMIN — POTASSIUM CHLORIDE 20 MEQ: 1500 TABLET, EXTENDED RELEASE ORAL at 12:43

## 2024-02-25 VITALS — DIASTOLIC BLOOD PRESSURE: 87 MMHG | SYSTOLIC BLOOD PRESSURE: 176 MMHG | RESPIRATION RATE: 16 BRPM

## 2024-02-25 VITALS — RESPIRATION RATE: 18 BRPM | SYSTOLIC BLOOD PRESSURE: 130 MMHG | DIASTOLIC BLOOD PRESSURE: 76 MMHG

## 2024-02-25 VITALS — RESPIRATION RATE: 18 BRPM | DIASTOLIC BLOOD PRESSURE: 72 MMHG | SYSTOLIC BLOOD PRESSURE: 105 MMHG

## 2024-02-25 VITALS — OXYGEN SATURATION: 97 % | HEART RATE: 85 BPM | DIASTOLIC BLOOD PRESSURE: 66 MMHG | SYSTOLIC BLOOD PRESSURE: 120 MMHG

## 2024-02-25 VITALS — DIASTOLIC BLOOD PRESSURE: 80 MMHG | SYSTOLIC BLOOD PRESSURE: 127 MMHG | RESPIRATION RATE: 18 BRPM

## 2024-02-25 VITALS — RESPIRATION RATE: 16 BRPM | SYSTOLIC BLOOD PRESSURE: 124 MMHG | DIASTOLIC BLOOD PRESSURE: 70 MMHG

## 2024-02-25 VITALS — RESPIRATION RATE: 16 BRPM | DIASTOLIC BLOOD PRESSURE: 58 MMHG | SYSTOLIC BLOOD PRESSURE: 134 MMHG

## 2024-02-25 LAB
BUN SERPL-MCNC: 22 MG/DL (ref 7–17)
CALCIUM SERPL-MCNC: 9.3 MG/DL (ref 8.4–10.2)
CHLORIDE SERPL-SCNC: 95 MMOL/L (ref 98–107)
CO2 SERPL-SCNC: 30 MMOL/L (ref 22–30)
EGFR: > 60
ERYTHROCYTE [DISTWIDTH] IN BLOOD BY AUTOMATED COUNT: 12.9 % (ref 11.5–14.5)
ESTIMATED CREATININE CLEARANCE: 76 ML/MIN
GLUCOSE - POINT OF CARE: 173 MG/DL (ref 70–99)
GLUCOSE - POINT OF CARE: 203 MG/DL (ref 70–99)
GLUCOSE - POINT OF CARE: 210 MG/DL (ref 70–99)
GLUCOSE - POINT OF CARE: 298 MG/DL (ref 70–99)
GLUCOSE SERPL-MCNC: 175 MG/DL (ref 70–99)
HCT VFR BLD AUTO: 33.3 % (ref 37–47)
HGB BLD-MCNC: 12.3 G/DL (ref 12–16)
MAGNESIUM SERPL-MCNC: 2 MG/DL (ref 1.6–2.3)
MCHC RBC AUTO-ENTMCNC: 36.9 G/DL (ref 33–37)
MCV RBC AUTO: 85.8 FL (ref 81–99)
PLATELET # BLD AUTO: 232 10^3/UL (ref 130–400)
POTASSIUM SERPL-SCNC: 3.7 MMOL/L (ref 3.5–5.1)
SODIUM SERPL-SCNC: 127 MMOL/L (ref 135–145)
SODIUM SERPL-SCNC: 130 MMOL/L (ref 135–145)

## 2024-02-25 RX ADMIN — LISINOPRIL 40 MG: 20 TABLET ORAL at 08:36

## 2024-02-25 RX ADMIN — INSULIN ASPART 1 UNITS: 100 INJECTION, SOLUTION INTRAVENOUS; SUBCUTANEOUS at 08:34

## 2024-02-25 RX ADMIN — POTASSIUM CHLORIDE 20 MEQ: 1500 TABLET, EXTENDED RELEASE ORAL at 11:29

## 2024-02-25 RX ADMIN — LEVOTHYROXINE SODIUM 50 MCG: 0.05 TABLET ORAL at 06:13

## 2024-02-25 RX ADMIN — DIGOXIN 125 MCG: 125 TABLET ORAL at 11:13

## 2024-02-25 RX ADMIN — APIXABAN 5 MG: 5 TABLET, FILM COATED ORAL at 08:36

## 2024-02-25 RX ADMIN — Medication 50 MCG: at 08:35

## 2024-02-25 RX ADMIN — APIXABAN 5 MG: 5 TABLET, FILM COATED ORAL at 20:27

## 2024-02-25 RX ADMIN — FAMOTIDINE 40 MG: 40 TABLET ORAL at 20:27

## 2024-02-25 RX ADMIN — SODIUM CHLORIDE 0.5 GRAM: 1 TABLET ORAL at 20:27

## 2024-02-25 RX ADMIN — INSULIN ASPART 2 UNITS: 100 INJECTION, SOLUTION INTRAVENOUS; SUBCUTANEOUS at 12:50

## 2024-02-25 RX ADMIN — INSULIN ASPART 2 UNITS: 100 INJECTION, SOLUTION INTRAVENOUS; SUBCUTANEOUS at 17:14

## 2024-02-26 VITALS — DIASTOLIC BLOOD PRESSURE: 92 MMHG | RESPIRATION RATE: 18 BRPM | SYSTOLIC BLOOD PRESSURE: 156 MMHG

## 2024-02-26 VITALS — RESPIRATION RATE: 18 BRPM | DIASTOLIC BLOOD PRESSURE: 78 MMHG | SYSTOLIC BLOOD PRESSURE: 127 MMHG

## 2024-02-26 VITALS — HEART RATE: 86 BPM | SYSTOLIC BLOOD PRESSURE: 166 MMHG | RESPIRATION RATE: 18 BRPM | DIASTOLIC BLOOD PRESSURE: 94 MMHG

## 2024-02-26 LAB
BUN SERPL-MCNC: 20 MG/DL (ref 7–17)
CALCIUM SERPL-MCNC: 9.8 MG/DL (ref 8.4–10.2)
CHLORIDE SERPL-SCNC: 95 MMOL/L (ref 98–107)
CO2 SERPL-SCNC: 32 MMOL/L (ref 22–30)
EGFR: > 60
ERYTHROCYTE [DISTWIDTH] IN BLOOD BY AUTOMATED COUNT: 13.3 % (ref 11.5–14.5)
ESTIMATED CREATININE CLEARANCE: 75 ML/MIN
GLUCOSE - POINT OF CARE: 200 MG/DL (ref 70–99)
GLUCOSE - POINT OF CARE: 265 MG/DL (ref 70–99)
GLUCOSE SERPL-MCNC: 201 MG/DL (ref 70–99)
HCT VFR BLD AUTO: 38.8 % (ref 37–47)
HGB BLD-MCNC: 13.7 G/DL (ref 12–16)
MAGNESIUM SERPL-MCNC: 1.9 MG/DL (ref 1.6–2.3)
MCHC RBC AUTO-ENTMCNC: 35.3 G/DL (ref 33–37)
MCV RBC AUTO: 87.2 FL (ref 81–99)
PLATELET # BLD AUTO: 275 10^3/UL (ref 130–400)
POTASSIUM SERPL-SCNC: 3.6 MMOL/L (ref 3.5–5.1)
SODIUM SERPL-SCNC: 132 MMOL/L (ref 135–145)

## 2024-02-26 RX ADMIN — APIXABAN 5 MG: 5 TABLET, FILM COATED ORAL at 09:01

## 2024-02-26 RX ADMIN — Medication 50 MCG: at 09:01

## 2024-02-26 RX ADMIN — POTASSIUM CHLORIDE 40 MEQ: 1500 TABLET, EXTENDED RELEASE ORAL at 10:23

## 2024-02-26 RX ADMIN — SODIUM CHLORIDE 0.5 GRAM: 1 TABLET ORAL at 09:02

## 2024-02-26 RX ADMIN — AMLODIPINE BESYLATE 2.5 MG: 2.5 TABLET ORAL at 09:01

## 2024-02-26 RX ADMIN — DIGOXIN 125 MCG: 125 TABLET ORAL at 12:35

## 2024-02-26 RX ADMIN — INSULIN ASPART 3 UNITS: 100 INJECTION, SOLUTION INTRAVENOUS; SUBCUTANEOUS at 12:32

## 2024-02-26 RX ADMIN — FUROSEMIDE 20 MG: 20 TABLET ORAL at 12:45

## 2024-02-26 RX ADMIN — LEVOTHYROXINE SODIUM 50 MCG: 0.05 TABLET ORAL at 06:22

## 2024-02-26 RX ADMIN — POTASSIUM CHLORIDE 20 MEQ: 1500 TABLET, EXTENDED RELEASE ORAL at 12:34

## 2024-02-26 RX ADMIN — LISINOPRIL 40 MG: 20 TABLET ORAL at 09:01

## 2024-02-26 RX ADMIN — INSULIN ASPART 2 UNITS: 100 INJECTION, SOLUTION INTRAVENOUS; SUBCUTANEOUS at 09:00

## 2024-08-22 ENCOUNTER — APPOINTMENT (OUTPATIENT)
Dept: MRI IMAGING | Facility: HOSPITAL | Age: 84
DRG: 065 | End: 2024-08-22
Payer: COMMERCIAL

## 2024-08-22 ENCOUNTER — HOSPITAL ENCOUNTER (INPATIENT)
Facility: HOSPITAL | Age: 84
LOS: 3 days | Discharge: NURSING, REHAB OR LONG-TERM CARE | DRG: 065 | End: 2024-08-26
Attending: EMERGENCY MEDICINE | Admitting: INTERNAL MEDICINE
Payer: COMMERCIAL

## 2024-08-22 ENCOUNTER — APPOINTMENT (EMERGENCY)
Dept: CT IMAGING | Facility: HOSPITAL | Age: 84
DRG: 065 | End: 2024-08-22
Payer: COMMERCIAL

## 2024-08-22 DIAGNOSIS — R47.81 SLURRED SPEECH: Primary | ICD-10-CM

## 2024-08-22 DIAGNOSIS — I10 ESSENTIAL HYPERTENSION: ICD-10-CM

## 2024-08-22 DIAGNOSIS — I63.9 LEFT PONTINE STROKE (HCC): ICD-10-CM

## 2024-08-22 PROBLEM — I48.91 ATRIAL FIBRILLATION (HCC): Status: ACTIVE | Noted: 2024-08-22

## 2024-08-22 PROBLEM — E11.9 TYPE 2 DIABETES MELLITUS WITHOUT COMPLICATION (HCC): Status: ACTIVE | Noted: 2024-08-22

## 2024-08-22 PROBLEM — R29.90 STROKE-LIKE SYMPTOMS: Status: ACTIVE | Noted: 2024-08-22

## 2024-08-22 PROBLEM — I48.20 CHRONIC ATRIAL FIBRILLATION (HCC): Chronic | Status: ACTIVE | Noted: 2021-05-28

## 2024-08-22 PROBLEM — G31.84 MILD COGNITIVE IMPAIRMENT: Status: ACTIVE | Noted: 2024-08-22

## 2024-08-22 PROBLEM — N63.21 MASS OF UPPER OUTER QUADRANT OF LEFT BREAST: Status: ACTIVE | Noted: 2023-09-14

## 2024-08-22 PROBLEM — I27.20 PULMONARY HYPERTENSION (HCC): Chronic | Status: ACTIVE | Noted: 2021-05-28

## 2024-08-22 PROBLEM — E11.9 DIABETES MELLITUS (HCC): Chronic | Status: ACTIVE | Noted: 2024-08-22

## 2024-08-22 LAB
2HR DELTA HS TROPONIN: -1 NG/L
ANION GAP SERPL CALCULATED.3IONS-SCNC: 9 MMOL/L (ref 4–13)
APTT PPP: 36 SECONDS (ref 23–34)
BUN SERPL-MCNC: 20 MG/DL (ref 5–25)
CALCIUM SERPL-MCNC: 10.3 MG/DL (ref 8.4–10.2)
CARDIAC TROPONIN I PNL SERPL HS: 8 NG/L
CARDIAC TROPONIN I PNL SERPL HS: 9 NG/L
CHLORIDE SERPL-SCNC: 100 MMOL/L (ref 96–108)
CO2 SERPL-SCNC: 26 MMOL/L (ref 21–32)
CREAT SERPL-MCNC: 0.87 MG/DL (ref 0.6–1.3)
ERYTHROCYTE [DISTWIDTH] IN BLOOD BY AUTOMATED COUNT: 14.4 % (ref 11.6–15.1)
FLUAV RNA RESP QL NAA+PROBE: NEGATIVE
FLUBV RNA RESP QL NAA+PROBE: NEGATIVE
GFR SERPL CREATININE-BSD FRML MDRD: 62 ML/MIN/1.73SQ M
GLUCOSE SERPL-MCNC: 162 MG/DL (ref 65–140)
GLUCOSE SERPL-MCNC: 175 MG/DL (ref 65–140)
GLUCOSE SERPL-MCNC: 192 MG/DL (ref 65–140)
HCT VFR BLD AUTO: 42.7 % (ref 34.8–46.1)
HGB BLD-MCNC: 14.7 G/DL (ref 11.5–15.4)
INR PPP: 1.28 (ref 0.85–1.19)
MCH RBC QN AUTO: 30.8 PG (ref 26.8–34.3)
MCHC RBC AUTO-ENTMCNC: 34.4 G/DL (ref 31.4–37.4)
MCV RBC AUTO: 89 FL (ref 82–98)
PLATELET # BLD AUTO: 239 THOUSANDS/UL (ref 149–390)
PLATELET # BLD AUTO: 252 THOUSANDS/UL (ref 149–390)
PMV BLD AUTO: 10.2 FL (ref 8.9–12.7)
PMV BLD AUTO: 10.3 FL (ref 8.9–12.7)
POTASSIUM SERPL-SCNC: 4.6 MMOL/L (ref 3.5–5.3)
PROTHROMBIN TIME: 16.5 SECONDS (ref 12.3–15)
RBC # BLD AUTO: 4.78 MILLION/UL (ref 3.81–5.12)
RSV RNA RESP QL NAA+PROBE: NEGATIVE
SARS-COV-2 RNA RESP QL NAA+PROBE: NEGATIVE
SODIUM SERPL-SCNC: 135 MMOL/L (ref 135–147)
WBC # BLD AUTO: 9.65 THOUSAND/UL (ref 4.31–10.16)

## 2024-08-22 PROCEDURE — 85610 PROTHROMBIN TIME: CPT | Performed by: EMERGENCY MEDICINE

## 2024-08-22 PROCEDURE — 99285 EMERGENCY DEPT VISIT HI MDM: CPT

## 2024-08-22 PROCEDURE — 82948 REAGENT STRIP/BLOOD GLUCOSE: CPT

## 2024-08-22 PROCEDURE — 93005 ELECTROCARDIOGRAM TRACING: CPT

## 2024-08-22 PROCEDURE — 80048 BASIC METABOLIC PNL TOTAL CA: CPT | Performed by: EMERGENCY MEDICINE

## 2024-08-22 PROCEDURE — 85730 THROMBOPLASTIN TIME PARTIAL: CPT | Performed by: EMERGENCY MEDICINE

## 2024-08-22 PROCEDURE — 85027 COMPLETE CBC AUTOMATED: CPT | Performed by: EMERGENCY MEDICINE

## 2024-08-22 PROCEDURE — 36415 COLL VENOUS BLD VENIPUNCTURE: CPT | Performed by: EMERGENCY MEDICINE

## 2024-08-22 PROCEDURE — 0241U HB NFCT DS VIR RESP RNA 4 TRGT: CPT | Performed by: EMERGENCY MEDICINE

## 2024-08-22 PROCEDURE — 70498 CT ANGIOGRAPHY NECK: CPT

## 2024-08-22 PROCEDURE — 99223 1ST HOSP IP/OBS HIGH 75: CPT | Performed by: PHYSICIAN ASSISTANT

## 2024-08-22 PROCEDURE — 96374 THER/PROPH/DIAG INJ IV PUSH: CPT

## 2024-08-22 PROCEDURE — 84484 ASSAY OF TROPONIN QUANT: CPT | Performed by: PHYSICIAN ASSISTANT

## 2024-08-22 PROCEDURE — 84484 ASSAY OF TROPONIN QUANT: CPT | Performed by: EMERGENCY MEDICINE

## 2024-08-22 PROCEDURE — 99291 CRITICAL CARE FIRST HOUR: CPT | Performed by: EMERGENCY MEDICINE

## 2024-08-22 PROCEDURE — 85049 AUTOMATED PLATELET COUNT: CPT | Performed by: PHYSICIAN ASSISTANT

## 2024-08-22 PROCEDURE — 70551 MRI BRAIN STEM W/O DYE: CPT

## 2024-08-22 PROCEDURE — 70496 CT ANGIOGRAPHY HEAD: CPT

## 2024-08-22 RX ORDER — ONDANSETRON 2 MG/ML
4 INJECTION INTRAMUSCULAR; INTRAVENOUS EVERY 6 HOURS PRN
Status: DISCONTINUED | OUTPATIENT
Start: 2024-08-22 | End: 2024-08-26 | Stop reason: HOSPADM

## 2024-08-22 RX ORDER — FAMOTIDINE 20 MG/1
20 TABLET, FILM COATED ORAL DAILY
COMMUNITY

## 2024-08-22 RX ORDER — PANTOPRAZOLE SODIUM 40 MG/1
40 TABLET, DELAYED RELEASE ORAL
Status: DISCONTINUED | OUTPATIENT
Start: 2024-08-23 | End: 2024-08-26 | Stop reason: HOSPADM

## 2024-08-22 RX ORDER — LABETALOL HYDROCHLORIDE 5 MG/ML
10 INJECTION, SOLUTION INTRAVENOUS EVERY 6 HOURS PRN
Status: DISCONTINUED | OUTPATIENT
Start: 2024-08-22 | End: 2024-08-26 | Stop reason: HOSPADM

## 2024-08-22 RX ORDER — DIGOXIN 125 MCG
187.5 TABLET ORAL DAILY
COMMUNITY

## 2024-08-22 RX ORDER — METOPROLOL TARTRATE 50 MG
50 TABLET ORAL EVERY 12 HOURS SCHEDULED
Status: ON HOLD | COMMUNITY
End: 2024-08-22 | Stop reason: CLARIF

## 2024-08-22 RX ORDER — ENOXAPARIN SODIUM 100 MG/ML
40 INJECTION SUBCUTANEOUS DAILY
Status: DISCONTINUED | OUTPATIENT
Start: 2024-08-23 | End: 2024-08-26

## 2024-08-22 RX ORDER — LABETALOL HYDROCHLORIDE 5 MG/ML
10 INJECTION, SOLUTION INTRAVENOUS ONCE
Status: COMPLETED | OUTPATIENT
Start: 2024-08-22 | End: 2024-08-22

## 2024-08-22 RX ORDER — OXYBUTYNIN CHLORIDE 5 MG/1
5 TABLET ORAL 3 TIMES DAILY
Status: ON HOLD | COMMUNITY
End: 2024-08-22 | Stop reason: CLARIF

## 2024-08-22 RX ORDER — ACETAMINOPHEN 325 MG/1
650 TABLET ORAL EVERY 6 HOURS PRN
Status: DISCONTINUED | OUTPATIENT
Start: 2024-08-22 | End: 2024-08-26 | Stop reason: HOSPADM

## 2024-08-22 RX ORDER — OLOPATADINE HYDROCHLORIDE 1 MG/ML
1 SOLUTION/ DROPS OPHTHALMIC 2 TIMES DAILY
COMMUNITY

## 2024-08-22 RX ORDER — DIGOXIN 125 MCG
187.5 TABLET ORAL DAILY
Status: DISCONTINUED | OUTPATIENT
Start: 2024-08-23 | End: 2024-08-26 | Stop reason: HOSPADM

## 2024-08-22 RX ORDER — LISINOPRIL 20 MG/1
20 TABLET ORAL DAILY
COMMUNITY
End: 2024-08-26

## 2024-08-22 RX ORDER — ATORVASTATIN CALCIUM 40 MG/1
40 TABLET, FILM COATED ORAL EVERY EVENING
Status: DISCONTINUED | OUTPATIENT
Start: 2024-08-23 | End: 2024-08-26 | Stop reason: HOSPADM

## 2024-08-22 RX ORDER — INSULIN LISPRO 100 [IU]/ML
1-5 INJECTION, SOLUTION INTRAVENOUS; SUBCUTANEOUS
Status: DISCONTINUED | OUTPATIENT
Start: 2024-08-23 | End: 2024-08-26 | Stop reason: HOSPADM

## 2024-08-22 RX ORDER — LEVOTHYROXINE SODIUM 50 UG/1
50 TABLET ORAL DAILY
COMMUNITY

## 2024-08-22 RX ORDER — ACETAMINOPHEN 650 MG/1
650 SUPPOSITORY RECTAL EVERY 4 HOURS PRN
COMMUNITY

## 2024-08-22 RX ORDER — INSULIN LISPRO 100 [IU]/ML
1-5 INJECTION, SOLUTION INTRAVENOUS; SUBCUTANEOUS
Status: DISCONTINUED | OUTPATIENT
Start: 2024-08-22 | End: 2024-08-26 | Stop reason: HOSPADM

## 2024-08-22 RX ORDER — ACETAMINOPHEN 325 MG/1
650 TABLET ORAL EVERY 6 HOURS PRN
COMMUNITY

## 2024-08-22 RX ORDER — POTASSIUM CHLORIDE 750 MG/1
20 CAPSULE, EXTENDED RELEASE ORAL DAILY
COMMUNITY

## 2024-08-22 RX ORDER — ASPIRIN 81 MG/1
81 TABLET, CHEWABLE ORAL DAILY
Status: DISCONTINUED | OUTPATIENT
Start: 2024-08-23 | End: 2024-08-26 | Stop reason: HOSPADM

## 2024-08-22 RX ORDER — LEVOTHYROXINE SODIUM 50 UG/1
50 TABLET ORAL
Status: DISCONTINUED | OUTPATIENT
Start: 2024-08-23 | End: 2024-08-26 | Stop reason: HOSPADM

## 2024-08-22 RX ADMIN — IOHEXOL 85 ML: 350 INJECTION, SOLUTION INTRAVENOUS at 16:40

## 2024-08-22 RX ADMIN — LABETALOL HYDROCHLORIDE 10 MG: 5 INJECTION, SOLUTION INTRAVENOUS at 17:23

## 2024-08-22 RX ADMIN — INSULIN LISPRO 1 UNITS: 100 INJECTION, SOLUTION INTRAVENOUS; SUBCUTANEOUS at 22:56

## 2024-08-22 NOTE — ASSESSMENT & PLAN NOTE
"No results found for: \"HGBA1C\"    Recent Labs     08/22/24  1629   POCGLU 175*       Blood Sugar Average: Last 72 hrs:  (P) 175  Check A1c  Start insulin sliding scale coverage while hospitalized  "

## 2024-08-22 NOTE — ASSESSMENT & PLAN NOTE
Rate controlled upon admission, in A-fib  Continue home digoxin  Eliquis on hold pending stroke workup

## 2024-08-22 NOTE — ASSESSMENT & PLAN NOTE
Presents with right-sided facial droop and right arm weakness.  Her speech was also slurred at that time.  Her last known normal was 12 noon.  She was found in this state at 4:30 PM at [facility].  Stroke alert was called in the ER however not TNK candidate secondary to Eliquis use  CT head, CTA head and neck with no acute findings noted to explain symptoms  Patient was discussed with neurology, admitted for stroke pathway.  Frequent vital signs.  Neuro checks.  Telemetry.  Check lipid panel and A1c.  Aspirin and statin.  Obtain MRI brain.  Check TTE.  Consult Neurology.  PT/OT consultation.

## 2024-08-22 NOTE — QUICK NOTE
Stroke alert 4:20 pm  Neurology call back 4;20 pm  Last normal 12:30 pm  Around 4 pm during nursing check at nursing home pt found to have rt facial droop, slurring, rle weakness  Pt is on eliquis for afib thus not a candidate for iv tnk  Bp 186/120    Ct head no hemorrhage or developing infarct. Cta head/neck no lvo or significant stenosis.    Possible acute left hemispheric lacunar infarct.    Hold eliquis from risks/benefits standpoint given acute stroke symptoms  Start baby aspirin from tomorrow. After mri brain scan it can be determined when to restart anticoagulation  Tele  2-d echo  Mri brain w/o contrast  Flp, tsh, hba1c, b12  Sbp 140-180.

## 2024-08-22 NOTE — ED PROVIDER NOTES
History  Chief Complaint   Patient presents with    CVA/TIA-like Symptoms     Pt was last seen normal at nursing home at 12:30pm. At 4pm, Nursing home staff noted slurred speech, right side weakness, and R facial droop and called EMS. EMS unable to place IV access. Pt  states she is on Eliquis     This is an 84-year-old female who presents via ambulance from [facility] for evaluation of slurred speech and right-sided weakness.  The symptoms were noted at 4:00 this afternoon by nursing staff.  Last known normal by nursing staff was at 1230 this afternoon.  Patient is currently on Eliquis for atrial fibrillation therefore not a thrombolytic candidate stroke alert was called and she was taken urgently to CAT scan.      History provided by:  Patient and EMS personnel  Medical Problem  Location:  Speech  Quality:  Slurred  Severity:  Moderate  Onset quality:  Sudden  Duration:  3 hours  Timing:  Constant  Progression:  Unchanged  Chronicity:  New  Context:  Slurred speech and right-sided weakness  Associated symptoms: no abdominal pain and no chest pain        Prior to Admission Medications   Prescriptions Last Dose Informant Patient Reported? Taking?   acetaminophen (TYLENOL) 325 mg tablet   Yes Yes   Sig: Take 650 mg by mouth every 6 (six) hours as needed for mild pain, headaches or fever   apixaban (ELIQUIS) 5 mg   Yes Yes   Sig: Take 5 mg by mouth 2 (two) times a day   digoxin (LANOXIN) 0.125 mg tablet   Yes Yes   Sig: Take 187.5 mcg by mouth daily   levothyroxine 50 mcg tablet   Yes Yes   Sig: Take 50 mcg by mouth daily   lisinopril (ZESTRIL) 20 mg tablet   Yes Yes   Sig: Take 20 mg by mouth daily   olopatadine (PATANOL) 0.1 % ophthalmic solution   Yes Yes   Si drop 2 (two) times a day   omeprazole (PriLOSEC) 20 mg delayed release capsule   Yes Yes   Sig: Take 20 mg by mouth daily   potassium chloride (MICRO-K) 10 MEQ CR capsule   Yes Yes   Sig: Take 20 mEq by mouth daily      Facility-Administered  Medications: None       History reviewed. No pertinent past medical history.    History reviewed. No pertinent surgical history.    History reviewed. No pertinent family history.  I have reviewed and agree with the history as documented.    E-Cigarette/Vaping    E-Cigarette Use Never User      E-Cigarette/Vaping Substances    Nicotine No     THC No     CBD No     Flavoring No     Other No     Unknown No      Social History     Tobacco Use    Smoking status: Never    Smokeless tobacco: Never   Vaping Use    Vaping status: Never Used   Substance Use Topics    Alcohol use: Never    Drug use: Never       Review of Systems   Cardiovascular:  Negative for chest pain.   Gastrointestinal:  Negative for abdominal pain.   Neurological:  Positive for speech difficulty and weakness.   All other systems reviewed and are negative.      Physical Exam  Physical Exam  Vitals and nursing note reviewed.   Constitutional:       Appearance: She is not toxic-appearing or diaphoretic.   HENT:      Head: Normocephalic and atraumatic.      Right Ear: External ear normal.      Left Ear: External ear normal.   Eyes:      Extraocular Movements: Extraocular movements intact.      Pupils: Pupils are equal, round, and reactive to light.   Cardiovascular:      Rate and Rhythm: Rhythm irregular.      Pulses: Normal pulses.   Pulmonary:      Effort: No respiratory distress.      Breath sounds: No stridor. No wheezing, rhonchi or rales.   Abdominal:      General: There is no distension.      Palpations: Abdomen is soft.      Tenderness: There is no abdominal tenderness. There is no guarding or rebound.   Musculoskeletal:         General: No swelling, tenderness, deformity or signs of injury.      Cervical back: Normal range of motion and neck supple. No rigidity or tenderness.      Right lower leg: No edema.      Left lower leg: No edema.   Skin:     General: Skin is warm and dry.      Findings: No erythema or rash.   Neurological:      Mental  Status: She is alert.      Cranial Nerves: Cranial nerve deficit present.      Sensory: No sensory deficit.      Motor: Weakness present.      Comments: Mild slurred speech right facial droop slight decreased right hand  and right leg drift for NIH stroke scale of 3   Psychiatric:         Mood and Affect: Mood normal.         Behavior: Behavior normal.         Vital Signs  ED Triage Vitals   Temperature Pulse Respirations Blood Pressure SpO2   08/22/24 1652 08/22/24 1629 08/22/24 1629 08/22/24 1629 08/22/24 1629   98.9 °F (37.2 °C) 102 20 (!) 186/120 98 %      Temp Source Heart Rate Source Patient Position - Orthostatic VS BP Location FiO2 (%)   08/22/24 1652 08/22/24 1629 08/22/24 1919 08/22/24 1919 --   Oral Monitor Lying Left arm       Pain Score       08/22/24 1739       No Pain           Vitals:    08/22/24 1846 08/22/24 1919 08/22/24 1948 08/22/24 2044   BP: (!) 174/111 (!) 181/108 (!) 173/100 (!) 167/103   Pulse: 78 86 80 68   Patient Position - Orthostatic VS:  Lying           Visual Acuity  Visual Acuity      Flowsheet Row Most Recent Value   L Pupil Size (mm) 4   R Pupil Size (mm) 4   L Pupil Shape Round   R Pupil Shape Round            ED Medications  Medications   ondansetron (ZOFRAN) injection 4 mg (has no administration in time range)   acetaminophen (TYLENOL) tablet 650 mg (has no administration in time range)   aspirin chewable tablet 81 mg (has no administration in time range)   atorvastatin (LIPITOR) tablet 40 mg (has no administration in time range)   enoxaparin (LOVENOX) subcutaneous injection 40 mg (has no administration in time range)   insulin lispro (HumALOG/ADMELOG) 100 units/mL subcutaneous injection 1-5 Units (has no administration in time range)   insulin lispro (HumALOG/ADMELOG) 100 units/mL subcutaneous injection 1-5 Units (has no administration in time range)   levothyroxine tablet 50 mcg (has no administration in time range)   pantoprazole (PROTONIX) EC tablet 40 mg (has no  administration in time range)   digoxin (LANOXIN) tablet 187.5 mcg (has no administration in time range)   labetalol (NORMODYNE) injection 10 mg (has no administration in time range)   iohexol (OMNIPAQUE) 350 MG/ML injection (MULTI-DOSE) 85 mL (85 mL Intravenous Given 8/22/24 1640)   labetalol (NORMODYNE) injection 10 mg (10 mg Intravenous Given 8/22/24 1723)       Diagnostic Studies  Results Reviewed       Procedure Component Value Units Date/Time    HS Troponin I 2hr [[id number]]  (Normal) Collected: 08/1942    Lab Status: Final result Specimen: Blood from Arm, Left Updated: 08/22/24 2010     hs TnI 2hr 8 ng/L      Delta 2hr hsTnI -1 ng/L     HS Troponin 0hr (reflex protocol) [[id number]]  (Normal) Collected: 08/22/24 1632    Lab Status: Final result Specimen: Blood from Arm, Right Updated: 08/22/24 1720     hs TnI 0hr 9 ng/L     FLU/RSV/COVID - if FLU/RSV clinically relevant [[id number]]  (Normal) Collected: 08/22/24 1632    Lab Status: Final result Specimen: Nares from Nose Updated: 08/22/24 1715     SARS-CoV-2 Negative     INFLUENZA A PCR Negative     INFLUENZA B PCR Negative     RSV PCR Negative    Narrative:      This test has been performed using the CoV-2/Flu/RSV plus assay on the [employer] GeneXpert platform. This test has been validated by the  and verified by the performing laboratory.     This test is designed to amplify and detect the following: nucleocapsid (N), envelope (E), and RNA-dependent RNA polymerase (RdRP) genes of the SARS-CoV-2 genome; matrix (M), basic polymerase (PB2), and acidic protein (PA) segments of the influenza A genome; matrix (M) and non-structural protein (NS) segments of the influenza B genome, and the nucleocapsid genes of RSV A and RSV B.     Positive results are indicative of the presence of Flu A, Flu B, RSV, and/or SARS-CoV-2 RNA. Positive results for SARS-CoV-2 or suspected novel influenza should be reported to state, local, or federal health departments  according to local reporting requirements.      All results should be assessed in conjunction with clinical presentation and other laboratory markers for clinical management.     FOR PEDIATRIC PATIENTS - copy/paste COVID Guidelines URL to browser: [email or web address]       Basic metabolic panel [[id number]]  (Abnormal) Collected: 08/22/24 1632    Lab Status: Final result Specimen: Blood from Arm, Right Updated: 08/22/24 1659     Sodium 135 mmol/L      Potassium 4.6 mmol/L      Chloride 100 mmol/L      CO2 26 mmol/L      ANION GAP 9 mmol/L      BUN 20 mg/dL      Creatinine 0.87 mg/dL      Glucose 192 mg/dL      Calcium 10.3 mg/dL      eGFR 62 ml/min/1.73sq m     Narrative:      National Kidney Disease Foundation guidelines for Chronic Kidney Disease (CKD):     Stage 1 with normal or high GFR (GFR > 90 mL/min/1.73 square meters)    Stage 2 Mild CKD (GFR = 60-89 mL/min/1.73 square meters)    Stage 3A Moderate CKD (GFR = 45-59 mL/min/1.73 square meters)    Stage 3B Moderate CKD (GFR = 30-44 mL/min/1.73 square meters)    Stage 4 Severe CKD (GFR = 15-29 mL/min/1.73 square meters)    Stage 5 End Stage CKD (GFR <15 mL/min/1.73 square meters)  Note: GFR calculation is accurate only with a steady state creatinine    Protime-INR [[id number]]  (Abnormal) Collected: 08/22/24 1632    Lab Status: Final result Specimen: Blood from Arm, Right Updated: 08/22/24 1654     Protime 16.5 seconds      INR 1.28    Narrative:      INR Therapeutic Range    Indication                                             INR Range      Atrial Fibrillation                                               2.0-3.0  Hypercoagulable State                                    2.0.2.3  Left Ventricular Asist Device                            2.0-3.0  Mechanical Heart Valve                                  -    Aortic(with afib, MI, embolism, HF, LA enlargement,    and/or coagulopathy)                                      2.0-3.0 (2.5-3.5)     Mitral                                                             2.5-3.5  Prosthetic/Bioprosthetic Heart Valve               2.0-3.0  Venous thromboembolism (VTE: VT, PE        2.0-3.0    APTT [[id number]]  (Abnormal) Collected: 08/22/24 1632    Lab Status: Final result Specimen: Blood from Arm, Right Updated: 08/22/24 1654     PTT 36 seconds     CBC and Platelet [[id number]]  (Normal) Collected: 08/22/24 1632    Lab Status: Final result Specimen: Blood from Arm, Right Updated: 08/22/24 1639     WBC 9.65 Thousand/uL      RBC 4.78 Million/uL      Hemoglobin 14.7 g/dL      Hematocrit 42.7 %      MCV 89 fL      MCH 30.8 pg      MCHC 34.4 g/dL      RDW 14.4 %      Platelets 252 Thousands/uL      MPV 10.3 fL     Fingerstick Glucose (POCT) [[id number]]  (Abnormal) Collected: 08/22/24 1629    Lab Status: Final result Specimen: Blood Updated: 08/22/24 1637     POC Glucose 175 mg/dl                    MRI brain wo contrast   Final Result by Nestor Mayfield MD (08/22 1909)      Acute left paramedian pontine small infarct. No associated hemorrhage.      Mild chronic microvascular ischemic change and additional chronic lacunar infarcts as described above.      The study was marked in EPIC for immediate notification.      Workstation performed: [id number]         CT stroke alert brain   Final Result by Jerson Olivarez MD (08/22 1648)      No acute intracranial abnormality.      Chronic microangiopathic ischemic changes.      Right parietotemporal craniotomy.            Workstation performed: [id number]         CTA stroke alert (head/neck)   Final Result by Jerson Olivarez MD (08/22 1715)      CTA head:   -Negative for large vessel intracranial occlusion .   -Areas of stenosis involving the bilateral LAURA A2 segments, left MCA bifurcation, right V4 segment, and proximal right PCA P2 segment.      CTA neck:   -No extracranial carotid stenosis.   -Slightly lobulated contour of the extracranial ICA  which can be seen with fibromuscular dysplasia (FMD).   -The cervical vertebral arteries are patent.      Subcentimeter calcified left thyroid nodules.      Subcentimeter lateral right upper lobe lung nodule. Based on current Fleischner Society 2017 Guidelines on incidental pulmonary nodule, no routine follow-up is needed if the patient is low risk. If the patient is high risk, optional follow-up chest CT at    12 months can be considered.         Findings were directly discussed with Dr. Lane Weiner at 5:05 p.m.      Workstation performed: [id number]                    Procedures  ECG 12 Lead Documentation Only    Date/Time: 8/22/2024 4:55 PM    Performed by: Darrell Wills DO  Authorized by: Darrell Wills DO    ECG reviewed by me, the ED Provider: yes    Patient location:  ED  Rate:     ECG rate:  101  Rhythm:     Rhythm: atrial fibrillation    Conduction:     Conduction: normal    ST segments:     ST segments:  Non-specific  CriticalCare Time    Date/Time: 8/22/2024 5:16 PM    Performed by: Darrell Wills DO  Authorized by: Darrell Wills DO    Critical care provider statement:     Critical care time (minutes):  30    Critical care time was exclusive of:  Separately billable procedures and treating other patients    Critical care was necessary to treat or prevent imminent or life-threatening deterioration of the following conditions:  CNS failure or compromise    Critical care was time spent personally by me on the following activities:  Obtaining history from patient or surrogate, discussions with consultants, evaluation of patient's response to treatment, examination of patient, interpretation of cardiac output measurements, ordering and performing treatments and interventions, ordering and review of laboratory studies, ordering and review of radiographic studies and re-evaluation of patient's condition (Stroke alert)    I assumed direction of critical care for this patient from another provider in  my specialty: no             ED Course  ED Course as of 08/22/24 2100   Thu Aug 22, 2024   1639 Discussed case with neurology Dr Weiner, workup in progress. Not thrombolytic candidate secondary to Eliquis. Will monitor blood pressure.   1648 No obvious bleed upon initial review by neurologist of CAT scan awaiting vascular reading.  Plan would be to hold the Eliquis give her a baby aspirin tomorrow and keep blood pressure systolic between 1 40-1 80   1710 Patient remains hypertensive will need to give labetalol neurologically she has unchanged formal NIH stroke scale at this time is a 514 age 1 for facial droop 1 for speech 1 for right arm and 1 for right leg drift                    Stroke Assessment       Row Name 08/22/24 1634             NIH Stroke Scale    Interval Baseline      Level of Consciousness (1a.) 0      LOC Questions (1b.) 0      LOC Commands (1c.) 0      Best Gaze (2.) 0      Visual (3.) 0      Facial Palsy (4.) 1      Motor Arm, Left (5a.) 0      Motor Arm, Right (5b.) 0      Motor Leg, Left (6a.) 0      Motor Leg, Right (6b.) 1      Limb Ataxia (7.) 0      Sensory (8.) 0      Best Language (9.) 0      Dysarthria (10.) 1      Extinction and Inattention (11.) (Formerly Neglect) 0      Total 3                    Flowsheet Row Most Recent Value   Thrombolytic Decision Options    Thrombolytic Decision Patient not a candidate.   Patient is not a candidate options Bleeding risk.  [Patient already on Eliquis]                      SBIRT 22yo+      Flowsheet Row Most Recent Value   Initial Alcohol Screen: US AUDIT-C     1. How often do you have a drink containing alcohol? 0 Filed at: 08/22/2024 1650   2. How many drinks containing alcohol do you have on a typical day you are drinking?  0 Filed at: 08/22/2024 1650   3b. FEMALE Any Age, or MALE 65+: How often do you have 4 or more drinks on one occassion? 0 Filed at: 08/22/2024 1650   Audit-C Score 0 Filed at: 08/22/2024 1650   DAMIR: How many times in the past  year have you...    Used an illegal drug or used a prescription medication for non-medical reasons? Never Filed at: 08/22/2024 1650                      Medical Decision Making  Strokelike symptoms alert was called    Amount and/or Complexity of Data Reviewed  Labs: ordered.  Radiology: ordered.    Risk  Prescription drug management.  Decision regarding hospitalization.                 Disposition  Final diagnoses:   Slurred speech     Time reflects when diagnosis was documented in both MDM as applicable and the Disposition within this note       Time User Action Codes Description Comment    8/22/2024  4:31 PM Darrell Wills Add [R47.81] Slurred speech           ED Disposition       ED Disposition   Admit    Condition   Stable    Date/Time   Thu Aug 22, 2024 1722    Comment   Case was discussed with tony and the patient's admission status was agreed to be Admission Status: observation status to the service of Dr. Starkey .               Follow-up Information    None         Current Discharge Medication List        CONTINUE these medications which have NOT CHANGED    Details   acetaminophen (TYLENOL) 325 mg tablet Take 650 mg by mouth every 6 (six) hours as needed for mild pain, headaches or fever      apixaban (ELIQUIS) 5 mg Take 5 mg by mouth 2 (two) times a day      digoxin (LANOXIN) 0.125 mg tablet Take 187.5 mcg by mouth daily      levothyroxine 50 mcg tablet Take 50 mcg by mouth daily      lisinopril (ZESTRIL) 20 mg tablet Take 20 mg by mouth daily      olopatadine (PATANOL) 0.1 % ophthalmic solution 1 drop 2 (two) times a day      omeprazole (PriLOSEC) 20 mg delayed release capsule Take 20 mg by mouth daily      potassium chloride (MICRO-K) 10 MEQ CR capsule Take 20 mEq by mouth daily             No discharge procedures on file.    PDMP Review       None            ED Provider  Electronically Signed by             Darrell Wills DO  08/22/24 2100

## 2024-08-22 NOTE — ASSESSMENT & PLAN NOTE
Presents with right-sided facial droop and right arm weakness.  Her speech was also slurred at that time.  Her last known normal was 12 noon.  She was found in this state at 4:30 PM at [facility].  Stroke alert was called in the ER however not TNK candidate secondary to Eliquis use  CT head, CTA head and neck with no acute findings noted to explain symptoms  Patient was discussed with neurology, admitted for stroke pathway.  Neuro checks.  Telemetry.  Lab Results   Component Value Date    HGBA1C 6.8 (H) 08/23/2024     Lab Results   Component Value Date    CHOLESTEROL 139 08/23/2024    TRIG 158 (H) 08/23/2024    HDL 36 (L) 08/23/2024    LDLCALC 71 08/23/2024        Aspirin and statin.  Hold Eliquis  MRI brain with acute left paramedian pontine small infarct. No associated hemorrhage.   Echo completed, final reading pending   Neurology following, ordered CT head for Monday, if unchanged , can resume home eliquis in addition to aspirin   PT/OT - rehab

## 2024-08-22 NOTE — ED NOTES
Pt was transported to MRI for ordered imaging study and then will be transported up to her assigned inpatient room 323     Luzmaria Lindsay RN  08/22/24 5291

## 2024-08-22 NOTE — QUICK NOTE
MRI brain results with acute left paramedian pontine small infarct. No associated hemorrhage.  On-call neurology recommending aspirin/statin, neurology consult tomorrow and continue with stroke pathway tonight.    Chico Mathur PA-C

## 2024-08-22 NOTE — INCIDENTAL FINDINGS
The following findings require follow up:  Subcentimeter lateral right upper lobe lung nodule. Based on current Fleischner Society 2017 Guidelines on incidental pulmonary nodule, no routine follow-up is needed if the patient is low risk. If the patient is high risk, optional follow-up chest CT at 12 months can be considered.        Please notify the following clinician to assist with the follow up:   PCP

## 2024-08-22 NOTE — ASSESSMENT & PLAN NOTE
Patient is on lisinopril, will hold  Permissive hypertension 140-180 systolic  Hold antihypertensives

## 2024-08-22 NOTE — H&P
"[facility]  H&P  Name: Brianna Meyers 84 y.o. female I MRN: [id number]  Unit/Bed#: -01 I Date of Admission: 8/22/2024   Date of Service: 8/22/2024 I Hospital Day: 0      Assessment & Plan   * Stroke-like symptoms  Assessment & Plan  Presents with right-sided facial droop and right arm weakness.  Her speech was also slurred at that time.  Her last known normal was 12 noon.  She was found in this state at 4:30 PM at [facility].  Stroke alert was called in the ER however not TNK candidate secondary to Eliquis use  CT head, CTA head and neck with no acute findings noted to explain symptoms  Patient was discussed with neurology, admitted for stroke pathway.  Frequent vital signs.  Neuro checks.  Telemetry.  Check lipid panel and A1c.  Aspirin and statin.  Obtain MRI brain.  Check TTE.  Consult Neurology.  PT/OT consultation.    Mild cognitive impairment  Assessment & Plan  Supportive care and redirection    Atrial fibrillation (HCC)  Assessment & Plan  Rate controlled upon admission, in A-fib  Continue home digoxin  Eliquis on hold pending stroke workup    Pulmonary hypertension (HCC)  Assessment & Plan  Follow-up on echocardiogram  Respiratory status is stable    Essential hypertension  Assessment & Plan  Patient is on lisinopril, will hold  Permissive hypertension 140-180 systolic  Hold antihypertensives    Type 2 diabetes mellitus without complication (HCC)  Assessment & Plan  No results found for: \"HGBA1C\"    Recent Labs     08/22/24  1629   POCGLU 175*       Blood Sugar Average: Last 72 hrs:  (P) 175  Check A1c  Start insulin sliding scale coverage while hospitalized         VTE Pharmacologic Prophylaxis: VTE Score: 8 High Risk (Score >/= 5) - Pharmacological DVT Prophylaxis Ordered: enoxaparin (Lovenox). Sequential Compression Devices Ordered.  Code Status: Level 1 - Full Code     Anticipated Length of Stay: Patient will be admitted on an observation basis with an " anticipated length of stay of less than 2 midnights secondary to strokelike symptoms, stroke rule out.    Total Time for Visit, including Counseling / Coordination of Care: 75 Minutes. Greater than 50% of this total time spent on direct patient counseling and coordination of care.    Chief Complaint: stroke like symptoms    History of Present Illness:  Brianna Meyers is a 84 y.o. female presenting from [facility] with strokelike symptoms.  Last known normal was around noon.  Staff walked in around 430 and noted she had right-sided facial droop and right-sided weakness.  Patient was sent to the ER, stroke alert was called, not a TNK candidate secondary to Eliquis use.  Patient will be worked up for stroke pathway at this time.    Review of Systems:  Review of Systems   Constitutional:  Negative for activity change, appetite change, chills, fatigue and fever.   HENT:  Negative for congestion, rhinorrhea, sinus pressure and sore throat.    Eyes:  Negative for photophobia, pain and visual disturbance.   Respiratory:  Negative for cough, shortness of breath and wheezing.    Cardiovascular:  Negative for chest pain, palpitations and leg swelling.   Gastrointestinal:  Negative for abdominal distention, abdominal pain, constipation, diarrhea, nausea and vomiting.   Endocrine: Negative for cold intolerance, heat intolerance, polydipsia and polyuria.   Genitourinary:  Negative for difficulty urinating, dysuria, flank pain, frequency and hematuria.   Musculoskeletal:  Negative for arthralgias, back pain and joint swelling.   Skin:  Negative for color change, pallor and rash.   Allergic/Immunologic: Negative.    Neurological:  Positive for facial asymmetry and weakness. Negative for dizziness, syncope, light-headedness and headaches.   Hematological: Negative.    Psychiatric/Behavioral: Negative.         Past Medical and Surgical History:   History reviewed. No pertinent past medical history.    History reviewed. No pertinent  "surgical history.    Meds/Allergies:  Prior to Admission medications    Medication Sig Start Date End Date Taking? Authorizing Provider   metoprolol tartrate (LOPRESSOR) 50 mg tablet Take 50 mg by mouth every 12 (twelve) hours    Historical Provider, MD   oxybutynin (DITROPAN) 5 mg tablet Take 5 mg by mouth Three times a day    Historical Provider, MD       All medications reviewed.    Allergies:   Allergies   Allergen Reactions    Sulfamethoxazole-Trimethoprim Hives       Social History:  Marital Status: /Civil Union     Substance Use History:   Social History     Substance and Sexual Activity   Alcohol Use Never     Social History     Tobacco Use   Smoking Status Never   Smokeless Tobacco Never     Social History     Substance and Sexual Activity   Drug Use Never       Family History:  Non-contributory    Physical Exam:     Vitals:   Blood Pressure: (!) 174/111 (08/22/24 1846)  Pulse: 78 (08/22/24 1846)  Temperature: (!) 97.3 °F (36.3 °C) (08/22/24 1846)  Temp Source: Oral (08/22/24 1652)  Respirations: 18 (08/22/24 1846)  Height: 5' 7\" (170.2 cm) (08/22/24 1739)  Weight - Scale: 71.2 kg (156 lb 15.5 oz) (08/22/24 1652)  SpO2: 97 % (08/22/24 1846)    Physical Exam  Vitals and nursing note reviewed.   Constitutional:       General: She is not in acute distress.     Appearance: Normal appearance.   HENT:      Head: Normocephalic and atraumatic.      Mouth/Throat:      Mouth: Mucous membranes are moist.   Eyes:      General: No scleral icterus.     Extraocular Movements: Extraocular movements intact.      Pupils: Pupils are equal, round, and reactive to light.   Cardiovascular:      Rate and Rhythm: Normal rate and regular rhythm.      Heart sounds: Normal heart sounds. No murmur heard.     No friction rub.   Pulmonary:      Effort: Pulmonary effort is normal.      Breath sounds: Normal breath sounds. No wheezing or rhonchi.   Abdominal:      General: Bowel sounds are normal. There is no distension.      " Palpations: Abdomen is soft.      Tenderness: There is no abdominal tenderness. There is no guarding.   Musculoskeletal:         General: No swelling.      Cervical back: Neck supple.      Right lower leg: No edema.      Left lower leg: No edema.   Skin:     General: Skin is warm and dry.      Capillary Refill: Capillary refill takes less than 2 seconds.      Coloration: Skin is not jaundiced or pale.   Neurological:      General: No focal deficit present.      Mental Status: She is alert and oriented to person, place, and time. Mental status is at baseline.      Cranial Nerves: Cranial nerve deficit (R facial droop) present.      Motor: Weakness present.          Additional Data:     Lab Results:  Results from last 7 days   Lab Units 08/22/24  1632   WBC Thousand/uL 9.65   HEMOGLOBIN g/dL 14.7   HEMATOCRIT % 42.7   PLATELETS Thousands/uL 252     Results from last 7 days   Lab Units 08/22/24  1632   SODIUM mmol/L 135   POTASSIUM mmol/L 4.6   CHLORIDE mmol/L 100   CO2 mmol/L 26   BUN mg/dL 20   CREATININE mg/dL 0.87   ANION GAP mmol/L 9   CALCIUM mg/dL 10.3*   GLUCOSE RANDOM mg/dL 192*     Results from last 7 days   Lab Units 08/22/24  1632   INR  1.28*     Results from last 7 days   Lab Units 08/22/24  1629   POC GLUCOSE mg/dl 175*               Imaging: All pertinent imaging reviewed.  CT stroke alert brain   Final Result by Jerson Olivarez MD (08/22 1648)      No acute intracranial abnormality.      Chronic microangiopathic ischemic changes.      Right parietotemporal craniotomy.            Workstation performed: [id number]         CTA stroke alert (head/neck)   Final Result by Jerson Olivarez MD (08/22 1711)      CTA head:   -Negative for large vessel intracranial occlusion .   -Areas of stenosis involving the bilateral LAURA A2 segments, left MCA bifurcation, right V4 segment, and proximal right PCA P2 segment.      CTA neck:   -No extracranial carotid stenosis.   -Slightly lobulated contour of the  extracranial ICA which can be seen with fibromuscular dysplasia (FMD).   -The cervical vertebral arteries are patent.      Subcentimeter calcified left thyroid nodules.      Subcentimeter lateral right upper lobe lung nodule. Based on current Fleischner Society 2017 Guidelines on incidental pulmonary nodule, no routine follow-up is needed if the patient is low risk. If the patient is high risk, optional follow-up chest CT at    12 months can be considered.         Findings were directly discussed with Dr. Lane Weiner at 5:05 p.m.      Workstation performed: [id number]         MRI brain wo contrast    (Results Pending)       EKG and Other Studies Reviewed on Admission:   Prior pertinent studies and records reviewed in [facility]/Care Everywhere.    ** Please Note: This note has been constructed using a voice recognition system. **

## 2024-08-23 ENCOUNTER — APPOINTMENT (OUTPATIENT)
Dept: NON INVASIVE DIAGNOSTICS | Facility: HOSPITAL | Age: 84
DRG: 065 | End: 2024-08-23
Payer: COMMERCIAL

## 2024-08-23 LAB
ANION GAP SERPL CALCULATED.3IONS-SCNC: 7 MMOL/L (ref 4–13)
AORTIC ROOT: 4.2 CM
APICAL FOUR CHAMBER EJECTION FRACTION: 50 %
ASCENDING AORTA: 3.8 CM
ATRIAL RATE: 81 BPM
BASOPHILS # BLD AUTO: 0.04 THOUSANDS/ÂΜL (ref 0–0.1)
BASOPHILS NFR BLD AUTO: 1 % (ref 0–1)
BSA FOR ECHO PROCEDURE: 1.82 M2
BUN SERPL-MCNC: 17 MG/DL (ref 5–25)
CALCIUM SERPL-MCNC: 9.8 MG/DL (ref 8.4–10.2)
CHLORIDE SERPL-SCNC: 101 MMOL/L (ref 96–108)
CHOLEST SERPL-MCNC: 139 MG/DL
CO2 SERPL-SCNC: 28 MMOL/L (ref 21–32)
CREAT SERPL-MCNC: 0.57 MG/DL (ref 0.6–1.3)
EOSINOPHIL # BLD AUTO: 0.15 THOUSAND/ÂΜL (ref 0–0.61)
EOSINOPHIL NFR BLD AUTO: 2 % (ref 0–6)
ERYTHROCYTE [DISTWIDTH] IN BLOOD BY AUTOMATED COUNT: 14.9 % (ref 11.6–15.1)
EST. AVERAGE GLUCOSE BLD GHB EST-MCNC: 148 MG/DL
FRACTIONAL SHORTENING: 16 (ref 28–44)
GFR SERPL CREATININE-BSD FRML MDRD: 86 ML/MIN/1.73SQ M
GLUCOSE P FAST SERPL-MCNC: 137 MG/DL (ref 65–99)
GLUCOSE SERPL-MCNC: 137 MG/DL (ref 65–140)
GLUCOSE SERPL-MCNC: 161 MG/DL (ref 65–140)
GLUCOSE SERPL-MCNC: 164 MG/DL (ref 65–140)
GLUCOSE SERPL-MCNC: 215 MG/DL (ref 65–140)
GLUCOSE SERPL-MCNC: 222 MG/DL (ref 65–140)
HBA1C MFR BLD: 6.8 %
HCT VFR BLD AUTO: 38.2 % (ref 34.8–46.1)
HDLC SERPL-MCNC: 36 MG/DL
HGB BLD-MCNC: 12.8 G/DL (ref 11.5–15.4)
IMM GRANULOCYTES # BLD AUTO: 0.05 THOUSAND/UL (ref 0–0.2)
IMM GRANULOCYTES NFR BLD AUTO: 1 % (ref 0–2)
INTERVENTRICULAR SEPTUM IN DIASTOLE (PARASTERNAL SHORT AXIS VIEW): 1.5 CM
INTERVENTRICULAR SEPTUM: 1.5 CM (ref 0.6–1.1)
LAAS-AP2: 35.6 CM2
LAAS-AP4: 32.6 CM2
LDLC SERPL CALC-MCNC: 71 MG/DL (ref 0–100)
LEFT ATRIUM SIZE: 4.1 CM
LEFT ATRIUM VOLUME (MOD BIPLANE): 129 ML
LEFT ATRIUM VOLUME INDEX (MOD BIPLANE): 70.5 ML/M2
LEFT INTERNAL DIMENSION IN SYSTOLE: 3.1 CM (ref 2.1–4)
LEFT VENTRICLE DIASTOLIC VOLUME (MOD BIPLANE): 100 ML
LEFT VENTRICLE DIASTOLIC VOLUME INDEX (MOD BIPLANE): 54.9 ML/M2
LEFT VENTRICLE SYSTOLIC VOLUME (MOD BIPLANE): 43 ML
LEFT VENTRICLE SYSTOLIC VOLUME INDEX (MOD BIPLANE): 23.6 ML/M2
LEFT VENTRICULAR INTERNAL DIMENSION IN DIASTOLE: 3.7 CM (ref 3.5–6)
LEFT VENTRICULAR POSTERIOR WALL IN END DIASTOLE: 1.3 CM
LEFT VENTRICULAR STROKE VOLUME: 22 ML
LV EF: 57 %
LVSV (TEICH): 22 ML
LYMPHOCYTES # BLD AUTO: 2.23 THOUSANDS/ÂΜL (ref 0.6–4.47)
LYMPHOCYTES NFR BLD AUTO: 29 % (ref 14–44)
MCH RBC QN AUTO: 30.1 PG (ref 26.8–34.3)
MCHC RBC AUTO-ENTMCNC: 33.5 G/DL (ref 31.4–37.4)
MCV RBC AUTO: 90 FL (ref 82–98)
MONOCYTES # BLD AUTO: 0.67 THOUSAND/ÂΜL (ref 0.17–1.22)
MONOCYTES NFR BLD AUTO: 9 % (ref 4–12)
NEUTROPHILS # BLD AUTO: 4.48 THOUSANDS/ÂΜL (ref 1.85–7.62)
NEUTS SEG NFR BLD AUTO: 58 % (ref 43–75)
NRBC BLD AUTO-RTO: 0 /100 WBCS
PLATELET # BLD AUTO: 236 THOUSANDS/UL (ref 149–390)
PMV BLD AUTO: 10.6 FL (ref 8.9–12.7)
POTASSIUM SERPL-SCNC: 3.6 MMOL/L (ref 3.5–5.3)
QRS AXIS: 82 DEGREES
QRSD INTERVAL: 94 MS
QT INTERVAL: 340 MS
QTC INTERVAL: 440 MS
RBC # BLD AUTO: 4.25 MILLION/UL (ref 3.81–5.12)
RIGHT ATRIUM AREA SYSTOLE A4C: 25.1 CM2
RIGHT VENTRICLE ID DIMENSION: 3.7 CM
SL CV LEFT ATRIUM LENGTH A2C: 7.2 CM
SL CV LV EF: 60
SL CV PED ECHO LEFT VENTRICLE DIASTOLIC VOLUME (MOD BIPLANE) 2D: 58 ML
SL CV PED ECHO LEFT VENTRICLE SYSTOLIC VOLUME (MOD BIPLANE) 2D: 36 ML
SODIUM SERPL-SCNC: 136 MMOL/L (ref 135–147)
T WAVE AXIS: 101 DEGREES
TR MAX PG: 28 MMHG
TR PEAK VELOCITY: 2.7 M/S
TRICUSPID ANNULAR PLANE SYSTOLIC EXCURSION: 2.9 CM
TRICUSPID VALVE PEAK REGURGITATION VELOCITY: 2.65 M/S
TRIGL SERPL-MCNC: 158 MG/DL
VENTRICULAR RATE: 101 BPM
WBC # BLD AUTO: 7.62 THOUSAND/UL (ref 4.31–10.16)

## 2024-08-23 PROCEDURE — 97530 THERAPEUTIC ACTIVITIES: CPT

## 2024-08-23 PROCEDURE — 92610 EVALUATE SWALLOWING FUNCTION: CPT

## 2024-08-23 PROCEDURE — 85025 COMPLETE CBC W/AUTO DIFF WBC: CPT | Performed by: STUDENT IN AN ORGANIZED HEALTH CARE EDUCATION/TRAINING PROGRAM

## 2024-08-23 PROCEDURE — 97535 SELF CARE MNGMENT TRAINING: CPT

## 2024-08-23 PROCEDURE — 99232 SBSQ HOSP IP/OBS MODERATE 35: CPT | Performed by: INTERNAL MEDICINE

## 2024-08-23 PROCEDURE — 97167 OT EVAL HIGH COMPLEX 60 MIN: CPT

## 2024-08-23 PROCEDURE — 99205 OFFICE O/P NEW HI 60 MIN: CPT | Performed by: PSYCHIATRY & NEUROLOGY

## 2024-08-23 PROCEDURE — 93306 TTE W/DOPPLER COMPLETE: CPT | Performed by: INTERNAL MEDICINE

## 2024-08-23 PROCEDURE — 80061 LIPID PANEL: CPT | Performed by: STUDENT IN AN ORGANIZED HEALTH CARE EDUCATION/TRAINING PROGRAM

## 2024-08-23 PROCEDURE — 93010 ELECTROCARDIOGRAM REPORT: CPT | Performed by: INTERNAL MEDICINE

## 2024-08-23 PROCEDURE — 97163 PT EVAL HIGH COMPLEX 45 MIN: CPT

## 2024-08-23 PROCEDURE — 80048 BASIC METABOLIC PNL TOTAL CA: CPT | Performed by: STUDENT IN AN ORGANIZED HEALTH CARE EDUCATION/TRAINING PROGRAM

## 2024-08-23 PROCEDURE — 82948 REAGENT STRIP/BLOOD GLUCOSE: CPT

## 2024-08-23 PROCEDURE — 93306 TTE W/DOPPLER COMPLETE: CPT

## 2024-08-23 PROCEDURE — 83036 HEMOGLOBIN GLYCOSYLATED A1C: CPT | Performed by: STUDENT IN AN ORGANIZED HEALTH CARE EDUCATION/TRAINING PROGRAM

## 2024-08-23 RX ORDER — POTASSIUM CHLORIDE 1500 MG/1
20 TABLET, EXTENDED RELEASE ORAL ONCE
Status: COMPLETED | OUTPATIENT
Start: 2024-08-23 | End: 2024-08-23

## 2024-08-23 RX ADMIN — INSULIN LISPRO 2 UNITS: 100 INJECTION, SOLUTION INTRAVENOUS; SUBCUTANEOUS at 13:04

## 2024-08-23 RX ADMIN — PANTOPRAZOLE SODIUM 40 MG: 40 TABLET, DELAYED RELEASE ORAL at 06:02

## 2024-08-23 RX ADMIN — ASPIRIN 81 MG CHEWABLE TABLET 81 MG: 81 TABLET CHEWABLE at 09:07

## 2024-08-23 RX ADMIN — DIGOXIN 187.5 MCG: 125 TABLET ORAL at 09:07

## 2024-08-23 RX ADMIN — INSULIN LISPRO 1 UNITS: 100 INJECTION, SOLUTION INTRAVENOUS; SUBCUTANEOUS at 21:13

## 2024-08-23 RX ADMIN — LEVOTHYROXINE SODIUM 50 MCG: 50 TABLET ORAL at 06:02

## 2024-08-23 RX ADMIN — ENOXAPARIN SODIUM 40 MG: 40 INJECTION SUBCUTANEOUS at 09:08

## 2024-08-23 RX ADMIN — INSULIN LISPRO 2 UNITS: 100 INJECTION, SOLUTION INTRAVENOUS; SUBCUTANEOUS at 18:01

## 2024-08-23 RX ADMIN — POTASSIUM CHLORIDE 20 MEQ: 1500 TABLET, EXTENDED RELEASE ORAL at 14:23

## 2024-08-23 RX ADMIN — INSULIN LISPRO 1 UNITS: 100 INJECTION, SOLUTION INTRAVENOUS; SUBCUTANEOUS at 09:04

## 2024-08-23 RX ADMIN — ATORVASTATIN CALCIUM 40 MG: 40 TABLET, FILM COATED ORAL at 18:00

## 2024-08-23 NOTE — PLAN OF CARE
Problem: PHYSICAL THERAPY ADULT  Goal: Performs mobility at highest level of function for planned discharge setting.  See evaluation for individualized goals.  Description: Treatment/Interventions: Functional transfer training, LE strengthening/ROM, Therapeutic exercise, Endurance training, Cognitive reorientation, Patient/family training, Equipment eval/education, Bed mobility          See flowsheet documentation for full assessment, interventions and recommendations.  8/23/2024 1435 by Marie Blackmon, PT  Note:    Problem List: Decreased strength, Decreased range of motion, Decreased endurance, Impaired balance, Decreased mobility, Decreased coordination, Decreased safety awareness  Assessment: Brianna Meyers is a 84 y.o. Female who presents to [facility] on 8/22/2024 from [facility] w/ c/o slurred speech and RUE weakness and diagnosis of L pontine stroke. Orders for PT eval and treat received. Pt presents w/ comorbidities of HTN, DMII, Afib, MCI. At baseline, pt mobilizes Ax1 w/ RW, and reports + falls in the last 6 months. Upon evaluation, pt presents w/ the following deficits: weakness, decreased ROM, impaired coordination, impaired balance, and decreased endurance. Upon eval, pt requires min A x 1 for bed mobility, max A x 2 for transfers. Based on this PT evaluation today, patient's discharge recommendation is for Level II - Moderate Rehab Resource Intensity. During this admission, pt would benefit from continued skilled inpatient PT in the acute care setting in order to address the abovementioned deficits to maximize function and mobility before DC from acute care.        Rehab Resource Intensity Level, PT: II (Moderate Resource Intensity)    See flowsheet documentation for full assessment.

## 2024-08-23 NOTE — SPEECH THERAPY NOTE
"Speech Language/Pathology  Speech-Language Pathology Bedside Swallow Evaluation      Patient Name: Brianna Meyers    Today's Date: 8/23/2024     Problem List  Principal Problem:    Left pontine stroke (HCC)  Active Problems:    Type 2 diabetes mellitus without complication (HCC)    Essential hypertension    Pulmonary hypertension (HCC)    Atrial fibrillation (HCC)    Mild cognitive impairment      Past Medical History  History reviewed. No pertinent past medical history.    Past Surgical History  History reviewed. No pertinent surgical history.    Summary   Pt presents w/ suspected min oral dysphagia, ultimately functional and suspected functional pharyngeal swallow skills.    Complete labial seal for retrieval and containment of all materials, no anterior bolus loss present. Reduced though functional bite strength for retrieval of regular textures w/ complete bolus breakdown and adequate bolus transfer. Min oral residue noted on R posterior lingual surface, minimized w/ liquid wash. Suspected fairly prompt swallow initiation and fair hyolaryngeal elevation to palpation. No overt s/s of aspiration at this time, of note cannot r/o sensory deficit at bedside.     Pt w/ increased risk of aspiration and adverse pulmonary sequelae 2/2 current admission for CVA, abn brain imaging, and advanced age. SLP to f/u for diet tolerance as able and appropriate, secure chat sent to MD regarding completion of CXR in ~3 days to aid in determining diet tolerance. ?need for VFSS, will monitor. SLP to monitor need for formal dysarthria assessment given acute onset and pt report of \"sounding close to herself\" and \"[her] speech is getting better\", will complete if warranted.     Recommended Diet: regular diet and thin liquids   Recommended Form of Meds: whole with liquid   Aspiration precautions and swallowing strategies: upright posture, only feed when fully alert, slow rate of feeding, small bites/sips, and alternating bites and " sips  Other Recommendations: Continue frequent oral care, feeding assistance as necessary, set up assistance     Current Medical Status  Pt is a 84 y.o. female w/ a PMHx significant for but not limited to HTN, Afib, presenting from [facility] with strokelike symptoms.  Last known normal was around noon.  Staff walked in around 430 and noted she had right-sided facial droop and right-sided weakness.  Patient was sent to the ER, stroke alert was called, not a TNK candidate secondary to Eliquis use. NIH of 3 on arrival.     Current Precautions:  Fall     Allergies:  No known food allergies    Past medical history:  Please see H&P for details    Special Studies:  8/22/24 MRI brain wo contrast:  Acute left paramedian pontine small infarct. No associated hemorrhage.     Mild chronic microvascular ischemic change and additional chronic lacunar infarcts as described above.    8/22/24 CTA stroke alert (head/neck):   No acute intracranial abnormality.     Chronic microangiopathic ischemic changes.     Right parietotemporal craniotomy.     8/22/24 CT stroke alert brain:  CTA head:   -Negative for large vessel intracranial occlusion .   -Areas of stenosis involving the bilateral LAURA A2 segments, left MCA bifurcation, right V4 segment, and proximal right PCA P2 segment.     CTA neck:   -No extracranial carotid stenosis.   -Slightly lobulated contour of the extracranial ICA which can be seen with fibromuscular dysplasia (FMD).   -The cervical vertebral arteries are patent.     Social/Education/Vocational Hx:  Pt lives in assisted living facility    Swallow Information   Current Diet: regular diet and thin liquids   Baseline Diet: regular diet and thin liquids per pt's facesheet       Baseline Assessment   Behavior/Cognition: alert  Speech/Language Status: able to participate in conversation and able to follow commands  Patient Positioning: upright in bed  Pain Status/Interventions/Response to Interventions: No report of or  "nonverbal indications of pain.       Swallow Mechanism Exam  Facial: symmetrical  Labial: WFL  Lingual: WFL  Velum: symmetrical  Mandible: adequate ROM  Dentition: missing multiple teeth   Vocal quality:clear/adequate, min-mild dysarthria noted will monitor   Volitional Cough: weak/adequate  Respiratory Status: on RA     Consistencies Assessed and Performance   Consistencies Administered: thin liquids via cup and straw sip and hard solids    Oral Stage:   Complete labial seal for retrieval and containment of all materials, no anterior bolus loss present. Reduced though functional bite strength for retrieval of regular textures w/ complete bolus breakdown and adequate bolus transfer. Min oral residue noted on R posterior lingual surface, minimized w/ liquid wash.     Pharyngeal Stage:   Suspected fairly prompt swallow initiation and fair hyolaryngeal elevation to palpation. No overt s/s of aspiration at this time, of note cannot r/o sensory deficit at bedside.     Esophageal Concerns: none reported    Summary and Recommendations (see above)    Results Reviewed with: patient, RN, and MD     Treatment Recommended: as able and appropriate, monitor chest X-ray secure chat sent to MD regarding repeat imaging in ~3 days, monitor needs for dysarthria assessment at the acute care level given acute onset and pt report of \"sounding close to herself\" and \"[her] speech is getting better\"    Dysphagia LTG  -Patient will demonstrate safe and effective oral intake (without overt s/s significant oral/pharyngeal dysphagia including s/s penetration or aspiration) for the highest appropriate diet level.     Short Term Goals:  -Pt will tolerate regular textures and thin liquids with no significant s/s oral or pharyngeal dysphagia across 1-3 diagnostic session/s    -Patient will comply with a Video/Modified Barium Swallow study for more complete assessment of swallowing anatomy/physiology/aspiration risk and to assess efficacy of " treatment techniques so as to best guide treatment plan if medically necessary     Speech Therapy Prognosis   Prognosis: good    Prognosis Considerations: age, medical status, and cognitive status

## 2024-08-23 NOTE — ASSESSMENT & PLAN NOTE
"Patient is on lisinopril, will hold  Hold antihypertensives  BP (!) 165/112   Pulse 72   Temp (!) 97.3 °F (36.3 °C)   Resp 16   Ht 5' 7\" (1.702 m)   Wt 71.3 kg (157 lb 3 oz)   SpO2 97%   BMI 24.62 kg/m²     "

## 2024-08-23 NOTE — PLAN OF CARE
Problem: NEUROSENSORY - ADULT  Goal: Achieves stable or improved neurological status  Description: INTERVENTIONS  - Monitor and report changes in neurological status  - Monitor vital signs such as temperature, blood pressure, glucose, and any other labs ordered   - Initiate measures to prevent increased intracranial pressure  - Monitor for seizure activity and implement precautions if appropriate      Outcome: Progressing  Goal: Remains free of injury related to seizures activity  Description: INTERVENTIONS  - Maintain airway, patient safety  and administer oxygen as ordered  - Monitor patient for seizure activity, document and report duration and description of seizure to physician/advanced practitioner  - If seizure occurs,  ensure patient safety during seizure  - Reorient patient post seizure  - Seizure pads on all 4 side rails  - Instruct patient/family to notify RN of any seizure activity including if an aura is experienced  - Instruct patient/family to call for assistance with activity based on nursing assessment  - Administer anti-seizure medications if ordered    Outcome: Progressing  Goal: Achieves maximal functionality and self care  Description: INTERVENTIONS  - Monitor swallowing and airway patency with patient fatigue and changes in neurological status  - Encourage and assist patient to increase activity and self care.   - Encourage visually impaired, hearing impaired and aphasic patients to use assistive/communication devices  Outcome: Progressing     Problem: CARDIOVASCULAR - ADULT  Goal: Maintains optimal cardiac output and hemodynamic stability  Description: INTERVENTIONS:  - Monitor I/O, vital signs and rhythm  - Monitor for S/S and trends of decreased cardiac output  - Administer and titrate ordered vasoactive medications to optimize hemodynamic stability  - Assess quality of pulses, skin color and temperature  - Assess for signs of decreased coronary artery perfusion  - Instruct patient to  report change in severity of symptoms  Outcome: Progressing  Goal: Absence of cardiac dysrhythmias or at baseline rhythm  Description: INTERVENTIONS:  - Continuous cardiac monitoring, vital signs, obtain 12 lead EKG if ordered  - Administer antiarrhythmic and heart rate control medications as ordered  - Monitor electrolytes and administer replacement therapy as ordered  Outcome: Progressing     Problem: SAFETY ADULT  Goal: Patient will remain free of falls  Description: INTERVENTIONS:  - Educate patient/family on patient safety including physical limitations  - Instruct patient to call for assistance with activity   - Consult OT/PT to assist with strengthening/mobility   - Keep Call bell within reach  - Keep bed low and locked with side rails adjusted as appropriate  - Keep care items and personal belongings within reach  - Initiate and maintain comfort rounds  - Make Fall Risk Sign visible to staff  - Offer Toileting every 2 Hours, in advance of need  - Initiate/Maintain 2 alarm  - Obtain necessary fall risk management equipment: 2  - Apply yellow socks and bracelet for high fall risk patients  - Consider moving patient to room near nurses station  Outcome: Progressing  Goal: Maintain or return to baseline ADL function  Description: INTERVENTIONS:  -  Assess patient's ability to carry out ADLs; assess patient's baseline for ADL function and identify physical deficits which impact ability to perform ADLs (bathing, care of mouth/teeth, toileting, grooming, dressing, etc.)  - Assess/evaluate cause of self-care deficits   - Assess range of motion  - Assess patient's mobility; develop plan if impaired  - Assess patient's need for assistive devices and provide as appropriate  - Encourage maximum independence but intervene and supervise when necessary  - Involve family in performance of ADLs  - Assess for home care needs following discharge   - Consider OT consult to assist with ADL evaluation and planning for  discharge  - Provide patient education as appropriate  Outcome: Progressing  Goal: Maintains/Returns to pre admission functional level  Description: INTERVENTIONS:  - Perform AM-PAC 6 Click Basic Mobility/ Daily Activity assessment daily.  - Set and communicate daily mobility goal to care team and patient/family/caregiver.   - Collaborate with rehabilitation services on mobility goals if consulted  - Perform Range of Motion 2 times a day.  - Reposition patient every 2 hours.  - Dangle patient 2 times a day  - Stand patient 2 times a day  - Ambulate patient 2 times a day  - Out of bed to chair 2 times a day   - Out of bed for meals 2 times a day  - Out of bed for toileting  - Record patient progress and toleration of activity level   Outcome: Progressing

## 2024-08-23 NOTE — ASSESSMENT & PLAN NOTE
Lab Results   Component Value Date    HGBA1C 6.8 (H) 08/23/2024       Recent Labs     08/22/24  1629 08/22/24 2054 08/23/24  0855 08/23/24  1142   POCGLU 175* 162* 164* 222*       Blood Sugar Average: Last 72 hrs:  (P) 180.75    Start insulin sliding scale coverage while hospitalized  Adjust as needed   Carb consistent diet

## 2024-08-23 NOTE — ASSESSMENT & PLAN NOTE
83 y/o female with Afib on Eliquis, HTN, DM, who presented on 8/22 with slurred speech and R sided weakness. BP on presentation 186/120 with SBP max 200. Stroke alert was initiated in ED. NIHSS per ED 3. Patient was deemed not a TNK candidate due to bleeding risk secondary to anticoagulation use.    Workup:  -CT head:  No acute intracranial abnormality.  Chronic microangiopathic ischemic changes.  Right parietotemporal craniotomy.  -CTA head and neck:  CTA head:  -Negative for large vessel intracranial occlusion .  -Areas of stenosis involving the bilateral LAURA A2 segments, left MCA bifurcation, right V4 segment, and proximal right PCA P2 segment.  CTA neck:  -No extracranial carotid stenosis.  -Slightly lobulated contour of the extracranial ICA which can be seen with fibromuscular dysplasia (FMD).  -The cervical vertebral arteries are patent.  Subcentimeter calcified left thyroid nodules.  Subcentimeter lateral right upper lobe lung nodule  -MRI brain:  Acute left paramedian pontine small infarct. No associated hemorrhage.  Mild chronic microvascular ischemic change and additional chronic lacunar infarcts as described above.  -Labs: LDL 71    Plan:  - Stroke pathway  Echo  Hemoglobin A1c  Currently on aspirin 81 mg   Eliquis currently on hold  See attending attestation for when to restart AC  Atorvastatin 40 mg  Permissive HTN, labetalol if SBP >200 x 24 hours from symptom onset; then goal normotension and avoid hypotension  Continue telemetry  PT/OT/ST  Frequent neuro checks. Continue to monitor and notify neurology with any changes.   - Medical management and supportive care per primary team. Correction of any metabolic or infectious disturbances.

## 2024-08-23 NOTE — OCCUPATIONAL THERAPY NOTE
Occupational Therapy Evaluation     Patient Name: Brianna Meyers  Today's Date: 8/23/2024  Problem List  Principal Problem:    Left pontine stroke (HCC)  Active Problems:    Type 2 diabetes mellitus without complication (HCC)    Essential hypertension    Pulmonary hypertension (HCC)    Atrial fibrillation (HCC)    Mild cognitive impairment    Past Medical History  History reviewed. No pertinent past medical history.  Past Surgical History  History reviewed. No pertinent surgical history.          08/23/24 1314   OT Last Visit   OT Visit Date 08/23/24   Note Type   Note type Evaluation   Pain Assessment   Pain Assessment Tool 0-10   Pain Score No Pain   Restrictions/Precautions   Weight Bearing Precautions Per Order No   Other Precautions Chair Alarm;Bed Alarm;Telemetry;Fall Risk;Hard of hearing   Home Living   Type of Home Other (Comment)  ([facility])   Home Layout One level;Performs ADLs on one level   Bathroom Shower/Tub Walk-in shower   Bathroom Toilet Standard   Bathroom Equipment Grab bars in shower;Grab bars around toilet   Bathroom Accessibility Accessible   Home Equipment Walker   Additional Comments  at bedside, pt lived in a 3 SH at baseline, 6+6+7 steps between floors, 1 JOSH via the garage, 5 JOSH from the front  (Pt has been at [facility]  since March)   Prior Function   Level of [place] Needs assistance with ADLs;Needs assistance with functional mobility  (currently at SNF)   Lives With   ( at baseline)   IADLs Family/Friend/Other provides transportation  ( +drives)   Falls in the last 6 months 1 to 4   Comments Pt has been Ax1 to walk with RW at facility to mobilize (prior to March, was not using AD in the home)   Lifestyle   Autonomy Patient has been at [facility] since March for skilled nursing care/rehab.  Prior patient was living with her  in a 3 story home, one-step to enter from the garage 6+ 7 steps to bedroom and full bath   Reciprocal Relationships  Supportive    ADL   Eating Assistance 4  Minimal Assistance   Eating Deficit Setup;Verbal cueing;Supervision/safety;Increased time to complete;Bringing food to mouth assist  (Right hand and weakness affects ability to hold utensil)   Grooming Assistance 4  Minimal Assistance   Grooming Deficit Setup;Verbal cueing;Supervision/safety;Increased time to complete;Wash/dry hands;Wash/dry face;Brushing hair   LB Bathing Assistance 2  Maximal Assistance   LB Bathing Deficit Setup;Verbal cueing;Increased time to complete;Supervision/safety   UB Dressing Assistance 4  Minimal Assistance   UB Dressing Deficit Setup;Verbal cueing;Supervision/safety;Increased time to complete  (gown management)   Toileting Assistance  2  Maximal Assistance   Toileting Deficit Perineal hygiene   Bed Mobility   Supine to Sit 4  Minimal assistance   Additional items Assist x 1   Transfers   Sit to Stand 2  Maximal assistance   Additional items Assist x 2   Stand to Sit 2  Maximal assistance   Additional items Assist x 2   Additional Comments Performed STS w/ max A x 2 for 2 trials. Initially w/ RW but BLEs are too far anterior. Attempted again w/ B HHA and B knee block, but again LEs sliding forward and she leans back   Activity Tolerance   Activity Tolerance Patient limited by fatigue   RUE Assessment   RUE Assessment   (decreased shoulder ROM, but equal bilaterally; R  3/5)   LUE Assessment   LUE Assessment   (Decreased shoulder ROM, but equal bilaterally; L  3+/5)   Hand Function   Fine Motor Coordination Impaired   Hand Function Comments impaired right finger to nose   Cognition   Arousal/Participation Alert;Cooperative   Attention Attends with cues to redirect   Orientation Level Oriented X4   Memory Decreased recall of recent events;Decreased short term memory  (details, timeline)   Following Commands Follows one step commands with increased time or repetition   Comments Pt tearful when asked orientation questions   Assessment    Limitation Decreased ADL status;Decreased UE strength;Decreased UE ROM;Decreased cognition;Decreased endurance;Decreased fine motor control;Decreased self-care trans;Decreased high-level ADLs   Prognosis Fair   Assessment Pt is a 84 y.o. female seen for OT evaluation s/p admission to [facility] on 8/22/2024  from [facility] w/ c/o slurred speech and RUE weakness and diagnosis of L pontine stroke. Diagnosed with Left pontine stroke (HCC). Pt presents w/ comorbidities of HTN, DMII, Afib, MCI. Personal and env factors supporting pt at time of IE include supportive  and staff, accessible home environment, FFSU, and no JOSH. Pt was receiving assistance for self help skills, Ambulated with Ax1 and use of RW, Dep IADLs and +fall history.  Upon initial evaluation, pt appears to be performing below baseline functional status. Personal and env factors inhibiting engagement in occupations include advanced age, difficulty completing ADLs, and difficulty completing IADLs.  Patient presents today with right-sided weakness, deconditioning, balance deficits in sitting and standing, Impaired FM coordination, impaired functional cognition and decreased activity tolerance affecting independence and safety with all functional tasks. Due to pt's current functional limitations and medical complications pt is functioning below baseline. Patient would benefit from OT services within the acute care setting to maximize level of functional independence in the following areas self-care transfers, functional mobility, and ADLs. From OT standpoint, recommendation at time of D/C would be Level II.   Goals   LTG Time Frame 10-14   Plan   Treatment Interventions ADL retraining;UE strengthening/ROM;Functional transfer training;Endurance training;Cognitive reorientation;Patient/family training;Equipment evaluation/education;Fine motor coordination activities;Compensatory technique education;Continued evaluation;Energy  conservation;Activityengagement   Goal Expiration Date 09/06/24   OT Treatment Day 0   OT Frequency 3-5x/wk   Discharge Recommendation   Rehab Resource Intensity Level, OT II (Moderate Resource Intensity)   AM-PAC Daily Activity Inpatient   Lower Body Dressing 2   Bathing 2   Toileting 1   Upper Body Dressing 3   Grooming 3   Eating 3   Daily Activity Raw Score 14   Daily Activity Standardized Score (Calc for Raw Score >=11) 33.39   AM-PAC Applied Cognition Inpatient   Following a Speech/Presentation 3   Understanding Ordinary Conversation 4   Taking Medications 1   Remembering Where Things Are Placed or Put Away 2   Remembering List of 4-5 Errands 2   Taking Care of Complicated Tasks 1   Applied Cognition Raw Score 13   Applied Cognition Standardized Score 30.46   Additional Treatment Session   Start Time 1338   End Time 1350   Treatment Assessment Patient seen today for OT follow-up session with emphasis on self-help skills and functional transfers.  Patient admitted from [facility] due to slurred speech, facial droop, and right-sided weakness.  Patient presents today with balance deficits in sitting and standing, right upper/lower extremity weakness, deconditioning, functional cognitive impairments, fine motor coordination deficits, slurred speech and decreased endurance affecting independence and safety with all functional tasks.  Patient was able to complete supine to sit with min assist and elevated head of bed.  Sit to stand performed from bed level height requiring max assist of 2, patient unable to maintain standing position for greater than 15 seconds to complete FABRICIO hygiene.  QuickStand utilized to complete bed to recliner transfer today.  Patient was able to complete washing hands and face with min assist after set up, upper body gown management performed with min assist and patient is dependent for FABRICIO hygiene and standing.  Patient reports being right-hand dominant, and notes increased difficulty  completing self-help skills with right upper extremity today.  Patient remains below baseline level of function and continues to benefit from OT at acute [facility] hospital setting, recommendation DC level II.   End of Consult   Education Provided Yes;Family or social support of family present for education by provider   Patient Position at End of Consult Bedside chair;Bed/Chair alarm activated;All needs within reach   Nurse Communication Nurse aware of consult     This session, pt required and most appropriately benefited from skilled OT/PT co-treatment due to extensive physical assistance of SKILLED therapists, significant regression from functional baseline, and decreased activity tolerance. OT and PT goals were addressed separately as seen in documentation.      GOALS:      -Patient will perform grooming tasks oral hygiene with overall Mod I in order to increase overall independence    -Patient will be Mod I with UB dressing using AE and AD as needed in order to increase (I) with ADLs    -Patient will be Supervision with UB bathing using AE and AD as needed in order to increase (I) with ADLs    -Patient will be Mod I with LB dressing with use of AE and AD as needed in order to increase (I) with ADLs    -Patient will be Min A  with LB bathing with use of AE and AD as needed in order to increase (I) with ADLs    -Patient will complete toileting w/ Mod I w/ G hygiene/thoroughness in order to reduce caregiver burden    -Patient will demonstrate Mod I with bed mobility for ability to manage own comfort and initiate OOB tasks.     -Patient will perform functional transfers with Supervision to/from all surfaces using DME as needed in order to increase (I) with functional tasks    -Patient will be Supervision with functional mobility to/from bathroom for increased independence with toileting tasks    -Patient will tolerate therapeutic activities for greater than 30 min, in order to increase tolerance for functional  activities.     -Patient will increase OOB/sitting tolerance to 2-4 hours per day to increase participation in self-care and leisure tasks with no s/s of exertion.     -Patient will engage in ongoing cognitive assessment in order to assist with safe discharge planning/recommendations.    -Patient will participate in 10m UE therex to increase overall stamina/activity tolerance for purposeful tasks    -Patient will demonstrate standing for 5-7 min in order to increase active participation in functional activities

## 2024-08-23 NOTE — PLAN OF CARE
Problem: OCCUPATIONAL THERAPY ADULT  Goal: Performs self-care activities at highest level of function for planned discharge setting.  See evaluation for individualized goals.  Description: Treatment Interventions: ADL retraining, UE strengthening/ROM, Functional transfer training, Endurance training, Cognitive reorientation, Patient/family training, Equipment evaluation/education, Fine motor coordination activities, Compensatory technique education, Continued evaluation, Energy conservation, Activityengagement          See flowsheet documentation for full assessment, interventions and recommendations.   Note: Limitation: Decreased ADL status, Decreased UE strength, Decreased UE ROM, Decreased cognition, Decreased endurance, Decreased fine motor control, Decreased self-care trans, Decreased high-level ADLs  Prognosis: Fair  Assessment: Pt is a 84 y.o. female seen for OT evaluation s/p admission to [facility] on 8/22/2024  from [facility] w/ c/o slurred speech and RUE weakness and diagnosis of L pontine stroke. Diagnosed with Left pontine stroke (HCC). Pt presents w/ comorbidities of HTN, DMII, Afib, MCI. Personal and env factors supporting pt at time of IE include supportive  and staff, accessible home environment, FFSU, and no JOSH. Pt was receiving assistance for self help skills, Ambulated with Ax1 and use of RW, Dep IADLs and +fall history.  Upon initial evaluation, pt appears to be performing below baseline functional status. Personal and env factors inhibiting engagement in occupations include advanced age, difficulty completing ADLs, and difficulty completing IADLs.  Patient presents today with right-sided weakness, deconditioning, balance deficits in sitting and standing, Impaired FM coordination, impaired functional cognition and decreased activity tolerance affecting independence and safety with all functional tasks. Due to pt's current functional limitations and medical  complications pt is functioning below baseline. Patient would benefit from OT services within the acute care setting to maximize level of functional independence in the following areas self-care transfers, functional mobility, and ADLs. From OT standpoint, recommendation at time of D/C would be Level II.     Rehab Resource Intensity Level, OT: II (Moderate Resource Intensity)

## 2024-08-23 NOTE — PROGRESS NOTES
"[facility]  Progress Note  Name: Brianna Meyers I  MRN: [id number]  Unit/Bed#: -[facility] I Date of Admission: 8/22/2024   Date of Service: 8/23/2024 I Hospital Day: 0    Assessment & Plan   Mild cognitive impairment  Assessment & Plan  Supportive care and redirection    Atrial fibrillation (HCC)  Assessment & Plan  Rate controlled upon admission, in A-fib  Continue home digoxin  Eliquis on hold pending stroke workup    Pulmonary hypertension (HCC)  Assessment & Plan  Follow-up on echocardiogram  Respiratory status is stable    Essential hypertension  Assessment & Plan  Patient is on lisinopril, will hold  Hold antihypertensives  BP (!) 165/112   Pulse 72   Temp (!) 97.3 °F (36.3 °C)   Resp 16   Ht 5' 7\" (1.702 m)   Wt 71.3 kg (157 lb 3 oz)   SpO2 97%   BMI 24.62 kg/m²       Type 2 diabetes mellitus without complication (HCC)  Assessment & Plan  Lab Results   Component Value Date    HGBA1C 6.8 (H) 08/23/2024       Recent Labs     08/22/24  1629 08/22/24  2054 08/23/24  0855 08/23/24  1142   POCGLU 175* 162* 164* 222*       Blood Sugar Average: Last 72 hrs:  (P) 180.75    Start insulin sliding scale coverage while hospitalized  Adjust as needed   Carb consistent diet     * Left pontine stroke (HCC)  Assessment & Plan  Presents with right-sided facial droop and right arm weakness.  Her speech was also slurred at that time.  Her last known normal was 12 noon.  She was found in this state at 4:30 PM at [facility].  Stroke alert was called in the ER however not TNK candidate secondary to Eliquis use  CT head, CTA head and neck with no acute findings noted to explain symptoms  Patient was discussed with neurology, admitted for stroke pathway.  Neuro checks.  Telemetry.  Lab Results   Component Value Date    HGBA1C 6.8 (H) 08/23/2024     Lab Results   Component Value Date    CHOLESTEROL 139 08/23/2024    TRIG 158 (H) 08/23/2024    HDL 36 (L) 08/23/2024    LDLCALC 71 " 2024        Aspirin and statin.  Hold Eliquis  MRI brain with acute left paramedian pontine small infarct. No associated hemorrhage.   Echo completed, final reading pending   Neurology following, ordered CT head for Monday, if unchanged , can resume home eliquis in addition to aspirin   PT/OT - rehab                VTE Pharmacologic Prophylaxis: VTE Score: 8 Moderate Risk (Score 3-4) - Pharmacological DVT Prophylaxis Ordered: enoxaparin (Lovenox).    Mobility:   Basic Mobility Inpatient Raw Score: 10  JH-HLM Goal: 4: Move to chair/commode  JH-HLM Achieved: 4: Move to chair/commode  JH-HLM Goal achieved. Continue to encourage appropriate mobility.    Patient Centered Rounds: I performed bedside rounds with nursing staff today.   Discussions with Specialists or Other Care Team Provider: yes    Education and Discussions with Family / Patient:  yes.     Total Time Spent on Date of Encounter in care of patient: 39  mins. This time was spent on one or more of the following: performing physical exam; counseling and coordination of care; obtaining or reviewing history; documenting in the medical record; reviewing/ordering tests, medications or procedures; communicating with other healthcare professionals and discussing with patient's family/caregivers.    Current Length of Stay: 0 day(s)  Current Patient Status: Observation   Certification Statement: The patient will continue to require additional inpatient hospital stay due to pending eval  Discharge Plan: Anticipate discharge in 48-72 hrs to rehab facility.    Code Status: Level 1 - Full Code    Subjective:   Seen and examined at bedside  Awake and alert  Denies any complaints     Objective:     Vitals:   Temp (24hrs), Av.7 °F (36.5 °C), Min:97.3 °F (36.3 °C), Max:98.9 °F (37.2 °C)    Temp:  [97.3 °F (36.3 °C)-98.9 °F (37.2 °C)] 97.3 °F (36.3 °C)  HR:  [] 72  Resp:  [16-20] 16  BP: (141-200)/() 165/112  SpO2:  [95 %-98 %] 97 %  Body mass index is  24.62 kg/m².     Input and Output Summary (last 24 hours):     Intake/Output Summary (Last 24 hours) at 8/23/2024 1525  Last data filed at 8/23/2024 1343  Gross per 24 hour   Intake 427 ml   Output 1192 ml   Net -765 ml       Physical Exam:   Physical Exam  Vitals reviewed.   HENT:      Head: Atraumatic.      Mouth/Throat:      Mouth: Mucous membranes are dry.   Cardiovascular:      Rate and Rhythm: Normal rate.      Heart sounds: Normal heart sounds.   Pulmonary:      Effort: No respiratory distress.   Abdominal:      General: Bowel sounds are normal.   Musculoskeletal:      Right lower leg: No edema.      Left lower leg: No edema.   Skin:     Findings: Bruising present.   Neurological:      Mental Status: She is alert.      Motor: Weakness present.   Psychiatric:         Mood and Affect: Mood normal.          Additional Data:     Labs:  Results from last 7 days   Lab Units 08/23/24  0438   WBC Thousand/uL 7.62   HEMOGLOBIN g/dL 12.8   HEMATOCRIT % 38.2   PLATELETS Thousands/uL 236   SEGS PCT % 58   LYMPHO PCT % 29   MONO PCT % 9   EOS PCT % 2     Results from last 7 days   Lab Units 08/23/24  0438   SODIUM mmol/L 136   POTASSIUM mmol/L 3.6   CHLORIDE mmol/L 101   CO2 mmol/L 28   BUN mg/dL 17   CREATININE mg/dL 0.57*   ANION GAP mmol/L 7   CALCIUM mg/dL 9.8   GLUCOSE RANDOM mg/dL 137     Results from last 7 days   Lab Units 08/22/24  1632   INR  1.28*     Results from last 7 days   Lab Units 08/23/24  1142 08/23/24  0855 08/22/24  2054 08/22/24  1629   POC GLUCOSE mg/dl 222* 164* 162* 175*     Results from last 7 days   Lab Units 08/23/24  0438   HEMOGLOBIN A1C % 6.8*           Lines/Drains:  Invasive Devices       Peripheral Intravenous Line  Duration             Peripheral IV 08/22/24 Right Antecubital <1 day                      Telemetry:  Telemetry Orders (From admission, onward)               24 Hour Telemetry Monitoring  (ED Bridging Orders Panel)  Continuous x 24 Hours (Telem)        Expiring   Question:   Reason for 24 Hour Telemetry  Answer:  TIA/Suspected CVA/ Confirmed CVA                     Telemetry Reviewed: Normal Sinus Rhythm  Indication for Continued Telemetry Use: Acute CVA             Imaging: Reviewed radiology reports from this admission including: MRI brain    Recent Cultures (last 7 days):         Last 24 Hours Medication List:   Current Facility-Administered Medications   Medication Dose Route Frequency Provider Last Rate    acetaminophen  650 mg Oral Q6H PRN Chico Mathur PA-C      aspirin  81 mg Oral Daily Chico Mathur PA-C      atorvastatin  40 mg Oral QPM Chico Mathur PA-C      digoxin  187.5 mcg Oral Daily Chico Mathur PA-C      enoxaparin  40 mg Subcutaneous Daily Chico Mathur PA-C      insulin lispro  1-5 Units Subcutaneous TID AC Chico Mathur PA-C      insulin lispro  1-5 Units Subcutaneous HS Chico Mathur PA-C      labetalol  10 mg Intravenous Q6H PRN Chico Mathur PA-C      levothyroxine  50 mcg Oral Early Morning Chico Mathur PA-C      ondansetron  4 mg Intravenous Q6H PRN Chico Mathur PA-C      pantoprazole  40 mg Oral Daily Before Breakfast Chico Mathur PA-C          Today, Patient Was Seen By: Shanta Duran MD    **Please Note: This note may have been constructed using a voice recognition system.**

## 2024-08-23 NOTE — CASE MANAGEMENT
Case Management Assessment & Discharge Planning Note    Patient name Brianna Meyers  Location /-01 MRN [id number]  : 1940 Date 2024       Current Admission Date: 2024  Current Admission Diagnosis:Left pontine stroke (HCC)   Patient Active Problem List    Diagnosis Date Noted Date Diagnosed    Type 2 diabetes mellitus without complication (HCC) 2024     Essential hypertension 2024     Atrial fibrillation (HCC) 2024     Left pontine stroke (HCC) 2024     Mild cognitive impairment 2024     Mass of upper outer quadrant of left breast 2023     Chronic atrial fibrillation (HCC) 2021     Pulmonary hypertension (HCC) 2021       LOS (days): 0  Geometric Mean LOS (GMLOS) (days):   Days to GMLOS:     OBJECTIVE:              Current admission status: Observation       Preferred Pharmacy:   UNKNOWN - FOLLOW UP PRIOR TO DISCHARGE TO E-PRESCRIBE  No address on file      Primary Care Provider: Rosario Shanks DO    Primary Insurance: [employer] [facility] [employer]  Secondary Insurance: [facility]    ASSESSMENT:  Active Health Care Proxies       Naeem Meyers [facility] Care Representative - Spouse   Primary Phone: [phone] (Mobile)                 Advance Directives  Does patient have a Health Care POA?: Yes  Does patient have Advance Directives?: Yes  Advance Directives: Living will  Primary Contact: Naeem Meyers, Spouse.    Obs Notice Signed: 24    Readmission Root Cause  30 Day Readmission: No    Patient Information  Admitted from:: Facility ([facility]  Mental Status: Alert  During Assessment patient was accompanied by: Spouse  Assessment information provided by:: Patient, Spouse  Primary Caregiver: Self  Support Systems: Self, Spouse/significant other  County of Residence: [place]  What city do you live in?: [place]  Home entry access options. Select all that apply.: No steps to enter home  Living Arrangements:  Other (Comment) (Lives at [facility].)    Activities of Daily Living Prior to Admission  Functional Status: Assistance  Completes ADLs independently?: No  Level of ADL dependence: Assistance  Ambulates independently?: No  Level of ambulatory dependence: Assistance  Does patient use assisted devices?: Yes  Assisted Devices (DME) used: Walker  Does patient currently own DME?: Yes  What DME does the patient currently own?: Walker  Does patient have a history of Outpatient Therapy (PT/OT)?: No  Does the patient have a history of Short-Term Rehab?: Yes ([facility])  Does patient have a history of HHC?: No  Does patient currently have HHC?: No         Patient Information Continued  Income Source: SSI/SSD  Does patient have prescription coverage?: Yes  Does patient receive dialysis treatments?: No  Does patient have a history of substance abuse?: No  Does patient have a history of Mental Health Diagnosis?: No         Means of Transportation  Means of Transport to Appts:: Family transport      Social Determinants of Health (SDOH)      Flowsheet Row Most Recent Value   Housing Stability    In the last 12 months, was there a time when you were not able to pay the mortgage or rent on time? N   In the past 12 months, how many times have you moved where you were living? 0   At any time in the past 12 months, were you homeless or living in a shelter (including now)? N   Transportation Needs    In the past 12 months, has lack of transportation kept you from medical appointments or from getting medications? no   In the past 12 months, has lack of transportation kept you from meetings, work, or from getting things needed for daily living? No   Food Insecurity    Within the past 12 months, you worried that your food would run out before you got the money to buy more. Never true   Within the past 12 months, the food you bought just didn't last and you didn't have money to get more. Never true   Utilities    In the past 12 months  has the electric, gas, oil, or water company threatened to shut off services in your home? No            DISCHARGE DETAILS:    Discharge planning discussed with:: Pt and pt's spouse.  Freedom of Choice: Yes     CM contacted family/caregiver?: Yes  Were Treatment Team discharge recommendations reviewed with patient/caregiver?: Yes  Did patient/caregiver verbalize understanding of patient care needs?: Yes  Were patient/caregiver advised of the risks associated with not following Treatment Team discharge recommendations?: Yes    Contacts  Patient Contacts: Naeem Meyers  Relationship to Patient:: Family  Contact Method: In Person  Reason/Outcome: Discharge Planning, Emergency Contact, Continuity of Care    Requested Home Health Care         Is the patient interested in HHC at discharge?: No    DME Referral Provided  Referral made for DME?: No    Other Referral/Resources/Interventions Provided:  Interventions: Facility Return  Referral Comments: CM called Magalistalin Nichols from [facility]. Magali informed CM that Brianna is a LTR at [facility]. Pt is a bedhold. CM [facility] and is awaiting accepting.                                                      Additional Comments: CM met with pt and spouse Naeem to discern discharge needs. Pt has been residing at [facility] since 2/26/2024, and per Magali Nichols from [facility], pt is a LTR/bedhold. Per Magali, pt is an assist x1, supervision and guard contact. Pt's home before residing at [facility] is a 2sh, 1STE, with the bedroom in the upstairs. Pt is able to ambulate short distances with the assistance of a walker, which pt owns. Pt does not report  a hx of HHC or OPPT. Pt's spouse Naeem noted pt has a hx of falls, which occured 02/24. Reports no SDOH, mental health, or D&A concerns. Pt has a living will. CM made a referral to [facility] for a resumption of care. Awaiting accepting. Transportation TBD.

## 2024-08-23 NOTE — CONSULTS
Consultation - Neurology   Brianna Meyers 84 y.o. female MRN: [id number]  Unit/Bed#: -01 Encounter: [id number]      Assessment & Plan     * Left pontine stroke (HCC)  Assessment & Plan  83 y/o female with Afib on Eliquis, HTN, DM, who presented on 8/22 with slurred speech and R sided weakness. BP on presentation 186/120 with SBP max 200. Stroke alert was initiated in ED. NIHSS per ED 3. Patient was deemed not a TNK candidate due to bleeding risk secondary to anticoagulation use.    Workup:  -CT head:  No acute intracranial abnormality.  Chronic microangiopathic ischemic changes.  Right parietotemporal craniotomy.  -CTA head and neck:  CTA head:  -Negative for large vessel intracranial occlusion .  -Areas of stenosis involving the bilateral LAURA A2 segments, left MCA bifurcation, right V4 segment, and proximal right PCA P2 segment.  CTA neck:  -No extracranial carotid stenosis.  -Slightly lobulated contour of the extracranial ICA which can be seen with fibromuscular dysplasia (FMD).  -The cervical vertebral arteries are patent.  Subcentimeter calcified left thyroid nodules.  Subcentimeter lateral right upper lobe lung nodule  -MRI brain:  Acute left paramedian pontine small infarct. No associated hemorrhage.  Mild chronic microvascular ischemic change and additional chronic lacunar infarcts as described above.  -Labs: LDL 71    Plan:  - Stroke pathway  Echo  Hemoglobin A1c  Currently on aspirin 81 mg   Eliquis currently on hold  See attending attestation for when to restart AC  Atorvastatin 40 mg  Permissive HTN, labetalol if SBP >200 x 24 hours from symptom onset; then goal normotension and avoid hypotension  Continue telemetry  PT/OT/ST  Frequent neuro checks. Continue to monitor and notify neurology with any changes.   - Medical management and supportive care per primary team. Correction of any metabolic or infectious disturbances.     Atrial fibrillation (HCC)  Assessment & Plan  -On Eliquis at baseline;  currently on hold  -Telemetry  -Medical management per primary team      Brianna Meyers will need follow-up in in 6 weeks with neurovascular team for Small vessel lacunar infarct in 60 minute appointment. They will not require outpatient neurological testing.       Case and treatment plan reviewed with attending neurologist, Dr. Weiner. Please see attending attestation for any further recommendations.    History of Present Illness     Reason for Consult / Principal Problem: Stroke  HPI: Brianna Meyers is a 84 y.o.  female with Afib on Eliquis, HTN, DM, who presents with stroke.    Patient seen and evaluated with attending neurologist. Patient states she wasn't able to hold the fork right with her right hand. She endorses trouble with her speech. She denies any issues with her RLE or LUE/LLE. She feels better now and reports that her speech is back to normal. She denies any pain. She reports she is on Eliquis at baseline.     History obtained via chart review:  Patient presented to the ED on 8/22 from nursing home for slurred speech and R sided weakness. Stroke alert was initiated in ED. BP on presentation 186/120 with SBP max 200. NIHSS per ED 3. Patient was deemed not a TNK candidate due to bleeding risk secondary to anticoagulation use.     Consult to Neurology  Consult performed by: JAZMINE Good  Consult ordered by: Darrell Wills DO          Review of Systems  A 12 system ROS was completed. Other than the above mentioned complaints in the HPI and those commented on below, all remaining systems were negative.    Historical Information   History reviewed. No pertinent past medical history.  History reviewed. No pertinent surgical history.  Social History   Social History     Substance and Sexual Activity   Alcohol Use Never     Social History     Substance and Sexual Activity   Drug Use Never     E-Cigarette/Vaping    E-Cigarette Use Never User      E-Cigarette/Vaping Substances    Nicotine No     THC No     CBD No      Flavoring No     Other No     Unknown No      Social History     Tobacco Use   Smoking Status Never   Smokeless Tobacco Never     Family History: History reviewed. No pertinent family history.    Review of previous medical records was completed.     Meds/Allergies   all current active meds have been reviewed, current meds:   Current Facility-Administered Medications   Medication Dose Route Frequency    acetaminophen (TYLENOL) tablet 650 mg  650 mg Oral Q6H PRN    aspirin chewable tablet 81 mg  81 mg Oral Daily    atorvastatin (LIPITOR) tablet 40 mg  40 mg Oral QPM    digoxin (LANOXIN) tablet 187.5 mcg  187.5 mcg Oral Daily    enoxaparin (LOVENOX) subcutaneous injection 40 mg  40 mg Subcutaneous Daily    insulin lispro (HumALOG/ADMELOG) 100 units/mL subcutaneous injection 1-5 Units  1-5 Units Subcutaneous TID AC    insulin lispro (HumALOG/ADMELOG) 100 units/mL subcutaneous injection 1-5 Units  1-5 Units Subcutaneous HS    labetalol (NORMODYNE) injection 10 mg  10 mg Intravenous Q6H PRN    levothyroxine tablet 50 mcg  50 mcg Oral Early Morning    ondansetron (ZOFRAN) injection 4 mg  4 mg Intravenous Q6H PRN    pantoprazole (PROTONIX) EC tablet 40 mg  40 mg Oral Daily Before Breakfast   , and PTA meds:   Prior to Admission Medications   Prescriptions Last Dose Informant Patient Reported? Taking?   Cholecalciferol (VITAMIN D3) 1,000 units tablet 8/22/2024  Yes Yes   Sig: Take 1,000 Units by mouth daily   acetaminophen (TYLENOL) 325 mg tablet Unknown  Yes No   Sig: Take 650 mg by mouth every 6 (six) hours as needed for mild pain, headaches or fever   acetaminophen (TYLENOL) 650 mg suppository More than a month  Yes No   Sig: Insert 650 mg into the rectum every 4 (four) hours as needed for mild pain   apixaban (ELIQUIS) 5 mg 8/22/2024  Yes Yes   Sig: Take 5 mg by mouth 2 (two) times a day   digoxin (LANOXIN) 0.125 mg tablet 8/22/2024  Yes Yes   Sig: Take 187.5 mcg by mouth daily   famotidine (PEPCID) 20 mg tablet Past  "Month  Yes Yes   Sig: Take 20 mg by mouth daily   levothyroxine 50 mcg tablet 2024  Yes Yes   Sig: Take 50 mcg by mouth daily   lisinopril (ZESTRIL) 20 mg tablet 2024  Yes Yes   Sig: Take 20 mg by mouth daily   olopatadine (PATANOL) 0.1 % ophthalmic solution 2024  Yes Yes   Si drop 2 (two) times a day   omeprazole (PriLOSEC) 20 mg delayed release capsule 2024  Yes Yes   Sig: Take 20 mg by mouth daily   potassium chloride (MICRO-K) 10 MEQ CR capsule 2024  Yes Yes   Sig: Take 20 mEq by mouth daily      Facility-Administered Medications: None       Allergies   Allergen Reactions    Sulfamethoxazole-Trimethoprim Hives       Objective   Vitals:Blood pressure (!) 165/112, pulse 72, temperature (!) 97.3 °F (36.3 °C), resp. rate 16, height 5' 7\" (1.702 m), weight 71.3 kg (157 lb 3 oz), SpO2 97%.,Body mass index is 24.62 kg/m².    Intake/Output Summary (Last 24 hours) at 2024 0857  Last data filed at 2024 0600  Gross per 24 hour   Intake 250 ml   Output 605 ml   Net -355 ml       Invasive Devices:   Invasive Devices       Peripheral Intravenous Line  Duration             Peripheral IV 24 Right Antecubital <1 day                    Physical and neurologic exam performed by attending neurologist:  Physical Exam  Vitals and nursing note reviewed.   Constitutional:       General: She is not in acute distress.     Appearance: She is not ill-appearing or diaphoretic.   HENT:      Head: Normocephalic.      Mouth/Throat:      Mouth: Mucous membranes are moist.      Pharynx: Oropharynx is clear.   Eyes:      General: No scleral icterus.        Right eye: No discharge.         Left eye: No discharge.      Extraocular Movements: EOM normal.      Conjunctiva/sclera: Conjunctivae normal.      Pupils: Pupils are equal, round, and reactive to light.   Cardiovascular:      Rate and Rhythm: Normal rate.   Pulmonary:      Effort: Pulmonary effort is normal. No respiratory distress.   Skin:     " General: Skin is warm and dry.      Coloration: Skin is not jaundiced or pale.   Neurological:      Mental Status: She is alert and oriented to person, place, and time.      Coordination: Finger-nose-finger test: R dysmetria, L normal. Heel-to-shin test: Clumsy bilaterally but symmetric.   Psychiatric:         Mood and Affect: Mood normal.       Neurologic Exam     Mental Status   Oriented to person, place, and time.   Level of consciousness: alert  Able to follow simple commands appropriately but has difficulty with multi-step commands. Has difficulty with more complicated calculations. No aphasia noted. Subtle dysarthria at times.     Cranial Nerves     CN II   Right visual field deficit: none  Left visual field deficit: none     CN III, IV, VI   Pupils are equal, round, and reactive to light.  Extraocular motions are normal.     CN V   Facial sensation intact.     CN VII   Facial expression full, symmetric.     CN VIII   Hearing: intact    CN IX, X   Palate: symmetric    CN XI   CN XI normal.     CN XII   CN XII normal.     Motor Exam   Muscle bulk: normal  Full strength throughout bilateral UE/LE except for:  R deltoid 4+/5     Sensory Exam   Light touch normal.     Gait, Coordination, and Reflexes     Coordination   Finger-nose-finger test: R dysmetria, L normal.  Heel-to-shin test: Clumsy bilaterally but symmetric.    Tremor   Resting tremor: absent  Intention tremor: absent    Reflexes   Right plantar: normal  Left plantar: normal      Lab Results: I have personally reviewed pertinent reports.  , CBC:   Results from last 7 days   Lab Units 08/23/24  0438 08/22/24  1942/24  1632   WBC Thousand/uL 7.62  --  9.65   RBC Million/uL 4.25  --  4.78   HEMOGLOBIN g/dL 12.8  --  14.7   HEMATOCRIT % 38.2  --  42.7   MCV fL 90  --  89   PLATELETS Thousands/uL 236 239 252   , BMP/CMP:   Results from last 7 days   Lab Units 08/23/24  0438 08/22/24  1632   SODIUM mmol/L 136 135   POTASSIUM mmol/L 3.6 4.6   CHLORIDE  "mmol/L 101 100   CO2 mmol/L 28 26   BUN mg/dL 17 20   CREATININE mg/dL 0.57* 0.87   CALCIUM mg/dL 9.8 10.3*   EGFR ml/min/1.73sq m 86 62   , Vitamin B12:   , HgBA1C:   , TSH:   , Coagulation:   Results from last 7 days   Lab Units 08/22/24  1632   INR  1.28*   , Lipid Profile:   Results from last 7 days   Lab Units 08/23/24  0438   HDL mg/dL 36*   LDL CALC mg/dL 71   TRIGLYCERIDES mg/dL 158*   , Ammonia:   , Urinalysis:       Invalid input(s): \"URIBILINOGEN\", Drug Screen:   , Medication Drug Levels:       Invalid input(s): \"CARBAMAZEPINE\", \"OXCARBAZEPINE\"    Imaging Studies: I have personally reviewed pertinent reports.   and I have personally reviewed pertinent films in PACS  EKG, Pathology, and Other Studies: I have personally reviewed pertinent reports.    VTE Prophylaxis: Sequential compression device (Venodyne)  and Enoxaparin (Lovenox)    Code Status: Level 1 - Full Code  Advance Directive and Living Will:      Power of :    POLST:    "

## 2024-08-23 NOTE — PHYSICAL THERAPY NOTE
"                                                                                  PHYSICAL THERAPY EVALUATION NOTE      Patient Name: Brianna Meyers  Today's Date: 2024    AGE:   84 y.o.  Mrn:   [id number]  ADMIT DX:  Slurred speech [R47.81]    History reviewed. No pertinent past medical history.  Length Of Stay: 0  PHYSICAL THERAPY EVALUATION :   Patient's identity confirmed via 2 patient identifiers (full name and ) at start of session       24 1317   PT Last Visit   PT Visit Date 24   Note Type   Note type Evaluation   Pain Assessment   Pain Assessment Tool 0-10   Pain Score No Pain   Restrictions/Precautions   Weight Bearing Precautions Per Order No   Other Precautions Chair Alarm;Bed Alarm;Telemetry;Fall Risk;Hard of hearing   Home Living   Type of Home Other (Comment)  ([facility])   Home Layout One level   Home Equipment Walker   Additional Comments  at bedside, pt lives in a 3 SH at baseline, 6+6+7 steps between floors, 1 JOSH via the garage, 5 JOSH from the front  (Pt has been at [facility] for [facility] since March)   Prior Function   Level of [place] Needs assistance with ADLs;Needs assistance with functional mobility  (currently)   Lives With   ( at baseline)   IADLs Family/Friend/Other provides transportation  ( drives)   Falls in the last 6 months 1 to 4   Vocational      Comments Pt has been Ax1 to walk with RW at facility to mobilize (prior to March, was not using AD in the home)    Addendum: per GARRETT note as they spoke w/ facility \"pt is a LTR/bedhold. Per Magali, pt is an assist x1, supervision and guard contact. \"   General   Family/Caregiver Present Yes  (, Naeem)   Cognition   Overall Cognitive Status Impaired   Arousal/Participation Alert   Orientation Level Oriented to person;Oriented to time;Oriented to situation;Oriented to place  (when asked where she is, states \"eliquis\", when provided choices, states hospital)   Memory Decreased recall of " recent events;Decreased short term memory  (details, timeline)   Following Commands Follows one step commands with increased time or repetition   Comments Pt tearful when asked orientation questions   RUE Assessment   RUE Assessment   (limited shoulder ROM, but equal bilaterally; R  3/5)   LUE Assessment   LUE Assessment   (limited shoulder ROM, but equal bilaterally; L  3+/5)   Strength RLE   RLE Overall Strength 3+/5   LLE Assessment   LLE Assessment WFL   Strength LLE   LLE Overall Strength 3+/5   Coordination   Movements are Fluid and Coordinated 0   Coordination and Movement Description R sided lean; past shooting w/ both L and R UE during gross movements like reaching   Finger to Nose & Finger to Finger  Impaired  (R dysmetria)   Bed Mobility   Supine to Sit 4  Minimal assistance   Additional Comments Brianna requires min--> mod A x 1 to sit EOB as she fatigues and her R sided lean becomes more prominent   Transfers   Sit to Stand 2  Maximal assistance   Additional items Assist x 2   Stand to Sit 2  Maximal assistance   Additional items Assist x 2   Additional Comments Performed STS w/ max A x 2 for 2 trials. Initially w/ RW but BLEs are too far anterior. Attempted again w/ B HHA and B knee block, but again LEs sliding forward and she leans back   Balance   Static Sitting   (Poor + --> Poor)   Static Standing Zero  (Ax2)   Activity Tolerance   Activity Tolerance Patient limited by fatigue   Medical Staff Made Aware OT GARRETT Platt   Nurse Made Aware FLOR Bey   Assessment   Problem List Decreased strength;Decreased range of motion;Decreased endurance;Impaired balance;Decreased mobility;Decreased coordination;Decreased safety awareness   Assessment Brianna Meyers is a 84 y.o. Female who presents to [facility] on 8/22/2024 from [facility] w/ c/o slurred speech and RUE weakness and diagnosis of L pontine stroke. Orders for PT eval and treat received. Pt presents w/ comorbidities of HTN, DMII, Afib, MCI. At  baseline, pt mobilizes Ax1 w/ RW, and reports + falls in the last 6 months. Upon evaluation, pt presents w/ the following deficits: weakness, decreased ROM, impaired coordination, impaired balance, and decreased endurance. Upon eval, pt requires min A x 1 for bed mobility, max A x 2 for transfers. Based on this PT evaluation today, patient's discharge recommendation is for Level II - Moderate Rehab Resource Intensity. During this admission, pt would benefit from continued skilled inpatient PT in the acute care setting in order to address the abovementioned deficits to maximize function and mobility before DC from acute care.   Goals   Patient Goals none stated   STG Expiration Date 09/02/24   Short Term Goal #1 Patient will: Perform all bed mobility tasks w/ supervision to improve pt's independence w/ repositioning for decrease risk of skin breakdown, Perform all transfers w/ modx1 consistently from various height surfaces in order to improve I w/ engagement w/ real-world environments/situations, Increase all balance 1/2 grade to decrease risk for falls, and Tolerate seated at EOB at least 20 minutes w/ supervision in order to work on trunk strength/endurance for functional tasks   PT Treatment Day 0   Plan   Treatment/Interventions Functional transfer training;LE strengthening/ROM;Therapeutic exercise;Endurance training;Cognitive reorientation;Patient/family training;Equipment eval/education;Bed mobility   PT Frequency 3-5x/wk   Discharge Recommendation   Rehab Resource Intensity Level, PT II (Moderate Resource Intensity)   Additional Comments Strongly recommend use of QuickMove for transfers w/ RN staff at this time   AM-PAC Basic Mobility Inpatient   Turning in Flat Bed Without Bedrails 3   Lying on Back to Sitting on Edge of Flat Bed Without Bedrails 3   Moving Bed to Chair 1   Standing Up From Chair Using Arms 1   Walk in Room 1   Climb 3-5 Stairs With Railing 1   Basic Mobility Inpatient Raw Score 10   Turning  Head Towards Sound 3   Follow Simple Instructions 3   Low Function Basic Mobility Raw Score  16   Low Function Basic Mobility Standardized Score  25.72   [facility] Highest Level Of Mobility   -HL Goal 4: Move to chair/commode   -HLM Achieved 4: Move to chair/commode   Additional Treatment Session   Start Time 1335   End Time 1345   Treatment Assessment Brianna is seated EOB w/ OT Lc present, requiring mod A x 1 due to R sided lean. Brought in quickmove to trial. She is able to perform STS w/ quickmove w/ max A x 2 for 2 trials both to stand to get onto quickmove, and stand to get off. Provided pillow under RUE for support. She is OOB in recliner chair at end of session, w/ QuickMove in room   Equipment Use QuickMove   Additional Treatment Day 1   End of Consult   Patient Position at End of Consult Bedside chair;Bed/Chair alarm activated;All needs within reach         The patient's AM-PAC Basic Mobility Inpatient Short Form Raw Score is 10, Standardized Score is  . A standardized score less than 38.32 (raw score of 16) suggests the patient may benefit from discharge to post-acute rehabilitation services which may not coincide with above PT recommendations. However please refer to therapist recommendation for discharge planning given other factors that may influence destination.    Pt would benefit from skilled inpatient PT during this admission in order to facilitate progress towards goals to maximize functional independence    Marie Blackmon PT, DPT

## 2024-08-23 NOTE — UTILIZATION REVIEW
Initial Clinical Review    Admission: Date/Time/Statement: 8/22/24 1728 observation and CHANGED 8/23/24 1815 INPATIENT RE: PATIENT NEEDS > 2 MIDNIGHT STAY DUE TO STROKE CONFIRMED BY MRI AND TO CONTINUE NEURO CHECKS, TELEMETRY, ON ASA.  ELIQUIS ON HOLD AND NEEDS FOLLOW UP CT BRAIN   Admission Orders (From admission, onward)       Ordered        08/23/24 1815  INPATIENT ADMISSION  Once            08/22/24 1728  Place in Observation  Once                          Orders Placed This Encounter   Procedures    INPATIENT ADMISSION     Standing Status:   Standing     Number of Occurrences:   1     Order Specific Question:   Level of Care     Answer:   Med Surg [16]     Order Specific Question:   Estimated length of stay     Answer:   More than 2 Midnights     Order Specific Question:   Certification     Answer:   I certify that inpatient services are medically necessary for this patient for a duration of greater than two midnights. See H&P and MD Progress Notes for additional information about the patient's course of treatment.     ED Arrival Information       Expected   -    Arrival   8/22/2024 16:20    Acuity   Emergent              Means of arrival   Ambulance    Escorted by   [facility]    Service   Hospitalist    Admission type   Emergency              Arrival complaint   Stroke Alert             Chief Complaint   Patient presents with    CVA/TIA-like Symptoms     Pt was last seen normal at nursing home at 12:30pm. At 4pm, Nursing home staff noted slurred speech, right side weakness, and R facial droop and called EMS. EMS unable to place IV access. Pt  states she is on Eliquis       Initial Presentation: 84 y.o. female  to ED via EMS from nursing facility.    Admitted to observation AND CONVERTED TO INPATIENT with Dx: Stroke like symptoms/confirmed left pontine stroke .  Presented to ED with slurred speech and right sided weakness noticed about 20 minutes prior to arrival with last known wellness about 4 hours  ago.  On Eliquis.  PMHx: atrial fib, hypertension, type 2 DM. On exam: rhythm irregular.  Mild slurred speech, right facial droop, slight decreased right hand  and right leg drift.  NIHSS 3.  Glucose 192.  INR 1.28. .   Imaging shows no acute findings on ct head.  Ecg atrial fib.   ED treatment:  stroke alert. Not a thrombolytic candidate due to eliquis .  Given IV Labetalol.    Plan includes to continue neuro checks, MRI brain, echo.  Telemetry.   Check lipid panel and A1c.  Consult neurology.  PT/OT.  Hold Eliquis. Continue home Dig.  Hold home lisinopril to allow for permissive hypertension.  Start SSI.     Per neurology 8/22/24:  stroke alert and has possible acute left hemispheric lacunar infarct.  .  Not a candidate for TNK.  Recommend to hold Eliquis.  Start baby asa tomorrow.  MRI brain.  Telemetry. Echo.  Keep  - 180.  Neuro checks.     Anticipated Length of Stay/Certification Statement:  Patient will be admitted on an observation basis with an anticipated length of stay of less than 2 midnights secondary to strokelike symptoms, stroke rule out.     Date: 8/23/24    Day 2 CHANGED TO INPATIENT :   Certification Statement: The patient will continue to require additional inpatient hospital stay due to pending eval    8/23/24 INPATIENT:  has right sided lean with PT evaluation, past shooting w/ both L and R UE during gross movements like reaching.  Fatigued.   on exam:  minimal dysarthria.  Right deltoid 4+ power slight dysmetria on right upper extremity finger-nose testing. Mild ataxia bilateral lower extremities appears to be partly related to swelling in her knees bilaterally.  MRI small vessel left paramedian pontine thrombotic infarct, there appears to be 3 separate distinct spots or punctate strokes on the DWI sequence although on the ADC sequence all appear to be contiguous.  Continue neuro checks.  Echo  pending.   Eliquis on hold.  On baby asa.   Continue statin.  On SSI with accuchecks.    Continue telemetry.      8/23/24 per neurology - left pontine stroke, differential is atheroembolic stroke from right sided intracranial V4 segment stenosis, lower suspicion and unlikely is Eliquis failure.  Continue to hold Eliquis.  Continue baby asa.  Ct head on 8/26/24.  If stable can restart Eliquis.   Follow up in vascular clinic.      Patient has crossed 3 midnights and requires ongoing care    8/24/2024 .  Patient presents with  no complaints   On exam mucous membranes dry.  Rhythm irreg.  Bruising.  Weakness   Abnormal labs or imaging - MRI brain with acute left paramedian pontine small infarct. No associated hemorrhage   Diagnosis/Plan    Left pontine stroke -  continue neuro checks.  On asa and statin.   Echo.  Ct head 8/26, if unchanged then can restart Eliquis.   PT/OT recommend rehab.    Continue SSI,carb controlled diet.  BP with home lisinopril.  Telemetry.  On hold Dig    ED Triage Vitals   Temperature Pulse Respirations Blood Pressure SpO2 Pain Score   08/22/24 1652 08/22/24 1629 08/22/24 1629 08/22/24 1629 08/22/24 1629 08/22/24 1739   98.9 °F (37.2 °C) 102 20 (!) 186/120 98 % No Pain     Weight (last 2 days)       Date/Time Weight    08/23/24 1450 71.2 (157)    08/22/24 19:19:35 71.3 (157.19)    08/22/24 1652 71.2 (156.97)            Vital Signs (last 3 days)       Date/Time Temp Pulse Resp BP MAP (mmHg) SpO2 O2 Device Patient Position - Orthostatic VS [place] Coma Scale Score Pain    08/24/24 11:11:32 -- 89 16 165/98 120 98 % None (Room air) Lying -- --    08/24/24 10:13:39 -- 69 -- 168/99 122 97 % -- -- -- --    08/24/24 09:17:48 -- 79 -- 174/103 127 97 % -- -- -- --    08/24/24 08:06:35 96.6 °F (35.9 °C) 74 16 181/103 129 97 % None (Room air) Lying -- --    08/24/24 07:25:10 -- -- 18 177/104 128 -- None (Room air) Lying -- --    08/23/24 21:18:40 97.4 °F (36.3 °C) 90 17 127/86 100 97 % None (Room air) Lying -- --    08/23/24 2036 -- -- -- -- -- -- -- -- -- No Pain    08/23/24 2000 -- -- -- --  -- -- -- -- 15 No Pain    08/23/24 1600 -- -- -- -- -- -- -- -- 15 --    08/23/24 1450 -- 92 -- 173/67 -- -- -- -- -- --    08/23/24 1317 -- -- -- -- -- -- -- -- -- No Pain    08/23/24 1314 -- -- -- -- -- -- -- -- -- No Pain    08/23/24 1200 -- -- -- -- -- -- -- -- 15 --    08/23/24 08:55:37 97.3 °F (36.3 °C) 72 -- 165/112 130 97 % -- -- -- --    08/23/24 0800 -- -- -- -- -- -- -- -- 14 No Pain    08/23/24 0608 -- -- -- -- -- -- -- -- 15 --    08/23/24 06:07:01 -- 57 -- 173/67 102 98 % -- -- -- --    08/23/24 04:27:55 -- 68 16 162/116 131 98 % -- Lying -- --    08/23/24 0420 -- -- -- -- -- -- -- -- 15 --    08/23/24 02:05:25 -- 67 16 141/82 102 97 % -- Lying -- --    08/23/24 0156 -- -- -- -- -- -- -- -- 15 --    08/22/24 23:56:22 97.7 °F (36.5 °C) 71 16 146/84 105 95 % -- Lying -- --    08/22/24 22:05:08 97.5 °F (36.4 °C) 61 16 143/107 119 97 % -- Lying -- No Pain    08/22/24 2158 -- -- -- -- -- -- -- -- 15 --    08/22/24 20:44:49 97.5 °F (36.4 °C) 68 -- 167/103 124 96 % -- -- -- --    08/22/24 2044 -- -- -- -- -- -- -- -- 15 --    08/22/24 19:48:51 97.5 °F (36.4 °C) 80 -- 173/100 124 97 % -- -- 14 --    08/22/24 19:19:35 97.5 °F (36.4 °C) 86 16 181/108 132 96 % None (Room air) Lying -- No Pain    08/22/24 18:46:53 97.3 °F (36.3 °C) 78 18 174/111 132 97 % -- -- -- --    08/22/24 1846 -- -- -- -- -- -- None (Room air) -- 14 No Pain    08/22/24 1745 -- 76 20 157/77 -- 97 % None (Room air) -- 14 No Pain    08/22/24 1739 -- -- -- -- -- -- -- -- -- No Pain    08/22/24 1730 -- 86 19 181/86 -- 97 % None (Room air) -- -- --    08/22/24 1715 -- 94 18 196/113 -- 97 % None (Room air) -- 14 --    08/22/24 1700 -- 106 20 198/96 -- 95 % None (Room air) -- -- --    08/22/24 1652 98.9 °F (37.2 °C) -- -- -- -- -- -- -- -- --    08/22/24 1645 -- 98 20 200/95 -- 95 % None (Room air) -- 14 --    08/22/24 1629 -- 102 20 186/120 -- 98 % None (Room air) -- -- --            Date and Time Eye Opening Best Verbal Response Best Motor  Response Cammie Coma Scale Score   08/23/24 2000 4 5 6 15   08/23/24 1600 4 5 6 15   08/23/24 1200 4 5 6 15   08/23/24 0800 4 4 6 14   08/23/24 0608 4 5 6 15   08/23/24 0420 4 5 6 15   08/23/24 0156 4 5 6 15   08/22/24 2158 4 5 6 15   08/22/24 2044 4 5 6 15   08/22/24 1948 4 4 6 14   08/22/24 1846 4 4 6 14   08/22/24 1745 4 4 6 14   08/22/24 1715 4 4 6 14   08/22/24 1645 4 4 6 14     Pertinent Labs/Diagnostic Test Results:   Radiology:  MRI brain wo contrast   Final Interpretation by Nestor Mayfield MD (08/22 1909)      Acute left paramedian pontine small infarct. No associated hemorrhage.      Mild chronic microvascular ischemic change and additional chronic lacunar infarcts as described above.      The study was marked in EPIC for immediate notification.      Workstation performed: [id number]         CT stroke alert brain   Final Interpretation by Jerson Olivarez MD (08/22 1648)      No acute intracranial abnormality.      Chronic microangiopathic ischemic changes.      Right parietotemporal craniotomy.            Workstation performed: [id number]         CTA stroke alert (head/neck)   Final Interpretation by Jerson Olivarez MD (08/22 1711)      CTA head:   -Negative for large vessel intracranial occlusion .   -Areas of stenosis involving the bilateral LAURA A2 segments, left MCA bifurcation, right V4 segment, and proximal right PCA P2 segment.      CTA neck:   -No extracranial carotid stenosis.   -Slightly lobulated contour of the extracranial ICA which can be seen with fibromuscular dysplasia (FMD).   -The cervical vertebral arteries are patent.      Subcentimeter calcified left thyroid nodules.      Subcentimeter lateral right upper lobe lung nodule. Based on current Fleischner Society 2017 Guidelines on incidental pulmonary nodule, no routine follow-up is needed if the patient is low risk. If the patient is high risk, optional follow-up chest CT at    12 months can be considered.         Findings were  directly discussed with Dr. Lane Weiner at 5:05 p.m.      Workstation performed: [id number]         CT head wo contrast    (Results Pending)     Cardiology:  Echo complete w/ contrast if indicated   Final Result by German Bravo MD (08/23 1611)        Left Ventricle: Left ventricular cavity size is normal. Wall thickness    is moderately increased. The left ventricular ejection fraction is 60%.    Systolic function is normal. Wall motion is normal.     Left Atrium: The atrium is severely dilated.     Right Atrium: The atrium is moderately dilated.     Aortic Valve: There is mild regurgitation. There is aortic valve    sclerosis.     Mitral Valve: There is mild regurgitation.     Tricuspid Valve: There is mild regurgitation.     Aorta: The aortic root is mildly dilated. The ascending aorta is mildly    dilated.     Pulmonary Artery: The pulmonary artery systolic pressure is normal.         ECG 12 lead   Final Result by German Bravo MD (08/23 1232)   Atrial fibrillation   Nonspecific ST and T wave abnormality   Abnormal ECG   No previous ECGs available   Confirmed by German Bravo ([id number]) on 8/23/2024 12:32:28 PM        Results from last 7 days   Lab Units 08/22/24  1632   SARS-COV-2  Negative     Results from last 7 days   Lab Units 08/24/24  0329 08/23/24  0438 08/22/24  1942/24  1632   WBC Thousand/uL 8.32 7.62  --  9.65   HEMOGLOBIN g/dL 13.9 12.8  --  14.7   HEMATOCRIT % 41.0 38.2  --  42.7   PLATELETS Thousands/uL 250 236 239 252   TOTAL NEUT ABS Thousands/µL 5.16 4.48  --   --      Results from last 7 days   Lab Units 08/24/24  0329 08/23/24  0438 08/22/24  1632   SODIUM mmol/L 138 136 135   POTASSIUM mmol/L 3.7 3.6 4.6   CHLORIDE mmol/L 102 101 100   CO2 mmol/L 28 28 26   ANION GAP mmol/L 8 7 9   BUN mg/dL 15 17 20   CREATININE mg/dL 0.58* 0.57* 0.87   EGFR ml/min/1.73sq m 86 86 62   CALCIUM mg/dL 10.0 9.8 10.3*     Results from last 7 days   Lab Units 08/24/24  1108 08/24/24  0721 08/23/24 2112  08/23/24  1711 08/23/24  1142 08/23/24  0855 08/22/24 2054 08/22/24  1629   POC GLUCOSE mg/dl 173* 154* 161* 215* 222* 164* 162* 175*     Results from last 7 days   Lab Units 08/24/24  0329 08/23/24  0438 08/22/24  1632   GLUCOSE RANDOM mg/dL 160* 137 192*     Results from last 7 days   Lab Units 08/23/24  0438   HEMOGLOBIN A1C % 6.8*   EAG mg/dl 148     Results from last 7 days   Lab Units 08/22/24  1942/24  1632   HS TNI 0HR ng/L  --  9   HS TNI 2HR ng/L 8  --    HSTNI D2 ng/L -1  --      Results from last 7 days   Lab Units 08/22/24  1632   PROTIME seconds 16.5*   INR  1.28*   PTT seconds 36*     Results from last 7 days   Lab Units 08/22/24  1632   INFLUENZA A PCR  Negative   INFLUENZA B PCR  Negative   RSV PCR  Negative         ED Treatment-Medication Administration from 08/22/2024 1619 to 08/22/2024 1836         Date/Time Order Dose Route Action     08/22/2024 1723 labetalol (NORMODYNE) injection 10 mg 10 mg Intravenous Given            History reviewed. No pertinent past medical history.  Present on Admission:   Persistent atrial fibrillation (HCC)   Type 2 diabetes mellitus without complication (HCC)   Essential hypertension   Pulmonary hypertension (HCC)      Admitting Diagnosis: Slurred speech [R47.81]  Age/Sex: 84 y.o. female  Admission Orders:  Scheduled Medications:  aspirin, 81 mg, Oral, Daily  atorvastatin, 40 mg, Oral, QPM  digoxin, 187.5 mcg, Oral, Daily  enoxaparin, 40 mg, Subcutaneous, Daily  insulin lispro, 1-5 Units, Subcutaneous, TID AC  insulin lispro, 1-5 Units, Subcutaneous, HS  levothyroxine, 50 mcg, Oral, Early Morning  pantoprazole, 40 mg, Oral, Daily Before Breakfast    potassium chloride (Klor-Con M20) CR tablet 20 mEq  Dose: 20 mEq  Freq: Once Route: PO  Start: 08/23/24 0945 End: 08/23/24 1423     Continuous IV Infusions:     PRN Meds: not used.   acetaminophen, 650 mg, Oral, Q6H PRN  labetalol, 10 mg, Intravenous, Q6H PRN  ondansetron, 4 mg, Intravenous, Q6H  PRN    Telemetry  Neuro checks Every 1 hour x 4 hours, then every 2 hours x 8 hours, then every 4 hours x 72 hours   PT/OT/Speech     IP CONSULT TO NEUROLOGY    Network Utilization Review Department  ATTENTION: Please call with any questions or concerns to [phone] and carefully listen to the prompts so that you are directed to the right person. All voicemails are confidential.   For Discharge needs, contact Care Management DC Support Team at [phone] opt. 2  Send all requests for admission clinical reviews, approved or denied determinations and any other requests to dedicated fax number below belonging to the campus where the patient is receiving treatment. List of dedicated fax numbers for the Facilities:  FACILITY NAME UR FAX NUMBER   ADMISSION DENIALS (Administrative/Medical Necessity) [phone]   DISCHARGE SUPPORT TEAM (NETWORK) [phone]   PARENT CHILD HEALTH (Maternity/NICU/Pediatrics) [phone]   [facility] [phone]   [facility] [phone]   [facility] [phone]   [facility] [phone]   [facility] [phone]   [facility] [phone]   [facility] [phone]   [facility] [phone]   [facility] [phone]   [facility] [phone]   [facility] [phone]   [facility] [phone]

## 2024-08-24 PROBLEM — I48.19 PERSISTENT ATRIAL FIBRILLATION (HCC): Status: ACTIVE | Noted: 2024-08-22

## 2024-08-24 LAB
ANION GAP SERPL CALCULATED.3IONS-SCNC: 8 MMOL/L (ref 4–13)
BASOPHILS # BLD AUTO: 0.05 THOUSANDS/ÂΜL (ref 0–0.1)
BASOPHILS NFR BLD AUTO: 1 % (ref 0–1)
BUN SERPL-MCNC: 15 MG/DL (ref 5–25)
CALCIUM SERPL-MCNC: 10 MG/DL (ref 8.4–10.2)
CHLORIDE SERPL-SCNC: 102 MMOL/L (ref 96–108)
CO2 SERPL-SCNC: 28 MMOL/L (ref 21–32)
CREAT SERPL-MCNC: 0.58 MG/DL (ref 0.6–1.3)
EOSINOPHIL # BLD AUTO: 0.17 THOUSAND/ÂΜL (ref 0–0.61)
EOSINOPHIL NFR BLD AUTO: 2 % (ref 0–6)
ERYTHROCYTE [DISTWIDTH] IN BLOOD BY AUTOMATED COUNT: 14.5 % (ref 11.6–15.1)
GFR SERPL CREATININE-BSD FRML MDRD: 86 ML/MIN/1.73SQ M
GLUCOSE SERPL-MCNC: 154 MG/DL (ref 65–140)
GLUCOSE SERPL-MCNC: 160 MG/DL (ref 65–140)
GLUCOSE SERPL-MCNC: 167 MG/DL (ref 65–140)
GLUCOSE SERPL-MCNC: 173 MG/DL (ref 65–140)
GLUCOSE SERPL-MCNC: 180 MG/DL (ref 65–140)
HCT VFR BLD AUTO: 41 % (ref 34.8–46.1)
HGB BLD-MCNC: 13.9 G/DL (ref 11.5–15.4)
IMM GRANULOCYTES # BLD AUTO: 0.06 THOUSAND/UL (ref 0–0.2)
IMM GRANULOCYTES NFR BLD AUTO: 1 % (ref 0–2)
LYMPHOCYTES # BLD AUTO: 2.13 THOUSANDS/ÂΜL (ref 0.6–4.47)
LYMPHOCYTES NFR BLD AUTO: 26 % (ref 14–44)
MCH RBC QN AUTO: 30.5 PG (ref 26.8–34.3)
MCHC RBC AUTO-ENTMCNC: 33.9 G/DL (ref 31.4–37.4)
MCV RBC AUTO: 90 FL (ref 82–98)
MONOCYTES # BLD AUTO: 0.75 THOUSAND/ÂΜL (ref 0.17–1.22)
MONOCYTES NFR BLD AUTO: 9 % (ref 4–12)
NEUTROPHILS # BLD AUTO: 5.16 THOUSANDS/ÂΜL (ref 1.85–7.62)
NEUTS SEG NFR BLD AUTO: 61 % (ref 43–75)
NRBC BLD AUTO-RTO: 0 /100 WBCS
PLATELET # BLD AUTO: 250 THOUSANDS/UL (ref 149–390)
PMV BLD AUTO: 10.5 FL (ref 8.9–12.7)
POTASSIUM SERPL-SCNC: 3.7 MMOL/L (ref 3.5–5.3)
RBC # BLD AUTO: 4.56 MILLION/UL (ref 3.81–5.12)
SODIUM SERPL-SCNC: 138 MMOL/L (ref 135–147)
WBC # BLD AUTO: 8.32 THOUSAND/UL (ref 4.31–10.16)

## 2024-08-24 PROCEDURE — 85025 COMPLETE CBC W/AUTO DIFF WBC: CPT | Performed by: INTERNAL MEDICINE

## 2024-08-24 PROCEDURE — 92526 ORAL FUNCTION THERAPY: CPT

## 2024-08-24 PROCEDURE — 80048 BASIC METABOLIC PNL TOTAL CA: CPT | Performed by: INTERNAL MEDICINE

## 2024-08-24 PROCEDURE — 82948 REAGENT STRIP/BLOOD GLUCOSE: CPT

## 2024-08-24 PROCEDURE — 92522 EVALUATE SPEECH PRODUCTION: CPT

## 2024-08-24 PROCEDURE — 99232 SBSQ HOSP IP/OBS MODERATE 35: CPT | Performed by: INTERNAL MEDICINE

## 2024-08-24 RX ORDER — LISINOPRIL 20 MG/1
20 TABLET ORAL DAILY
Status: DISCONTINUED | OUTPATIENT
Start: 2024-08-24 | End: 2024-08-25

## 2024-08-24 RX ADMIN — INSULIN LISPRO 1 UNITS: 100 INJECTION, SOLUTION INTRAVENOUS; SUBCUTANEOUS at 21:35

## 2024-08-24 RX ADMIN — PANTOPRAZOLE SODIUM 40 MG: 40 TABLET, DELAYED RELEASE ORAL at 05:33

## 2024-08-24 RX ADMIN — DIGOXIN 187.5 MCG: 125 TABLET ORAL at 08:12

## 2024-08-24 RX ADMIN — INSULIN LISPRO 1 UNITS: 100 INJECTION, SOLUTION INTRAVENOUS; SUBCUTANEOUS at 18:21

## 2024-08-24 RX ADMIN — LEVOTHYROXINE SODIUM 50 MCG: 50 TABLET ORAL at 05:33

## 2024-08-24 RX ADMIN — ENOXAPARIN SODIUM 40 MG: 40 INJECTION SUBCUTANEOUS at 08:12

## 2024-08-24 RX ADMIN — ATORVASTATIN CALCIUM 40 MG: 40 TABLET, FILM COATED ORAL at 18:21

## 2024-08-24 RX ADMIN — INSULIN LISPRO 1 UNITS: 100 INJECTION, SOLUTION INTRAVENOUS; SUBCUTANEOUS at 08:12

## 2024-08-24 RX ADMIN — ASPIRIN 81 MG CHEWABLE TABLET 81 MG: 81 TABLET CHEWABLE at 08:12

## 2024-08-24 RX ADMIN — LABETALOL HYDROCHLORIDE 10 MG: 5 INJECTION, SOLUTION INTRAVENOUS at 09:32

## 2024-08-24 RX ADMIN — LISINOPRIL 20 MG: 20 TABLET ORAL at 08:34

## 2024-08-24 RX ADMIN — INSULIN LISPRO 1 UNITS: 100 INJECTION, SOLUTION INTRAVENOUS; SUBCUTANEOUS at 12:56

## 2024-08-24 NOTE — PLAN OF CARE
Problem: Potential for Falls  Goal: Patient will remain free of falls  Description: INTERVENTIONS:  - Educate patient/family on patient safety including physical limitations  - Instruct patient to call for assistance with activity   - Consult OT/PT to assist with strengthening/mobility   - Keep Call bell within reach  - Keep bed low and locked with side rails adjusted as appropriate  - Keep care items and personal belongings within reach  - Initiate and maintain comfort rounds  - Make Fall Risk Sign visible to staff  - Offer Toileting every 2 Hours, in advance of need  - Initiate/Maintain 2 alarm  - Obtain necessary fall risk management equipment: 2  - Apply yellow socks and bracelet for high fall risk patients  - Consider moving patient to room near nurses station  Outcome: Progressing     Problem: NEUROSENSORY - ADULT  Goal: Achieves stable or improved neurological status  Description: INTERVENTIONS  - Monitor and report changes in neurological status  - Monitor vital signs such as temperature, blood pressure, glucose, and any other labs ordered   - Initiate measures to prevent increased intracranial pressure  - Monitor for seizure activity and implement precautions if appropriate      Outcome: Progressing  Goal: Remains free of injury related to seizures activity  Description: INTERVENTIONS  - Maintain airway, patient safety  and administer oxygen as ordered  - Monitor patient for seizure activity, document and report duration and description of seizure to physician/advanced practitioner  - If seizure occurs,  ensure patient safety during seizure  - Reorient patient post seizure  - Seizure pads on all 4 side rails  - Instruct patient/family to notify RN of any seizure activity including if an aura is experienced  - Instruct patient/family to call for assistance with activity based on nursing assessment  - Administer anti-seizure medications if ordered    Outcome: Progressing  Goal: Achieves maximal  functionality and self care  Description: INTERVENTIONS  - Monitor swallowing and airway patency with patient fatigue and changes in neurological status  - Encourage and assist patient to increase activity and self care.   - Encourage visually impaired, hearing impaired and aphasic patients to use assistive/communication devices  Outcome: Progressing     Problem: CARDIOVASCULAR - ADULT  Goal: Maintains optimal cardiac output and hemodynamic stability  Description: INTERVENTIONS:  - Monitor I/O, vital signs and rhythm  - Monitor for S/S and trends of decreased cardiac output  - Administer and titrate ordered vasoactive medications to optimize hemodynamic stability  - Assess quality of pulses, skin color and temperature  - Assess for signs of decreased coronary artery perfusion  - Instruct patient to report change in severity of symptoms  Outcome: Progressing  Goal: Absence of cardiac dysrhythmias or at baseline rhythm  Description: INTERVENTIONS:  - Continuous cardiac monitoring, vital signs, obtain 12 lead EKG if ordered  - Administer antiarrhythmic and heart rate control medications as ordered  - Monitor electrolytes and administer replacement therapy as ordered  Outcome: Progressing     Problem: GENITOURINARY - ADULT  Goal: Maintains or returns to baseline urinary function  Description: INTERVENTIONS:  - Assess urinary function  - Encourage oral fluids to ensure adequate hydration if ordered  - Administer IV fluids as ordered to ensure adequate hydration  - Administer ordered medications as needed  - Offer frequent toileting  - Follow urinary retention protocol if ordered  Outcome: Progressing  Goal: Absence of urinary retention  Description: INTERVENTIONS:  - Assess patient's ability to void and empty bladder  - Monitor I/O  - Bladder scan as needed  - Discuss with physician/AP medications to alleviate retention as needed  - Discuss catheterization for long term situations as appropriate  Outcome: Progressing      Problem: MUSCULOSKELETAL - ADULT  Goal: Maintain or return mobility to safest level of function  Description: INTERVENTIONS:  - Assess patient's ability to carry out ADLs; assess patient's baseline for ADL function and identify physical deficits which impact ability to perform ADLs (bathing, care of mouth/teeth, toileting, grooming, dressing, etc.)  - Assess/evaluate cause of self-care deficits   - Assess range of motion  - Assess patient's mobility  - Assess patient's need for assistive devices and provide as appropriate  - Encourage maximum independence but intervene and supervise when necessary  - Involve family in performance of ADLs  - Assess for home care needs following discharge   - Consider OT consult to assist with ADL evaluation and planning for discharge  - Provide patient education as appropriate  Outcome: Progressing  Goal: Maintain proper alignment of affected body part  Description: INTERVENTIONS:  - Support, maintain and protect limb and body alignment  - Provide patient/ family with appropriate education  Outcome: Progressing     Problem: SAFETY ADULT  Goal: Patient will remain free of falls  Description: INTERVENTIONS:  - Educate patient/family on patient safety including physical limitations  - Instruct patient to call for assistance with activity   - Consult OT/PT to assist with strengthening/mobility   - Keep Call bell within reach  - Keep bed low and locked with side rails adjusted as appropriate  - Keep care items and personal belongings within reach  - Initiate and maintain comfort rounds  - Make Fall Risk Sign visible to staff  - Offer Toileting every 2 Hours, in advance of need  - Initiate/Maintain 2 alarm  - Obtain necessary fall risk management equipment: 2  - Apply yellow socks and bracelet for high fall risk patients  - Consider moving patient to room near nurses station  Outcome: Progressing  Goal: Maintain or return to baseline ADL function  Description: INTERVENTIONS:  -  Assess  patient's ability to carry out ADLs; assess patient's baseline for ADL function and identify physical deficits which impact ability to perform ADLs (bathing, care of mouth/teeth, toileting, grooming, dressing, etc.)  - Assess/evaluate cause of self-care deficits   - Assess range of motion  - Assess patient's mobility; develop plan if impaired  - Assess patient's need for assistive devices and provide as appropriate  - Encourage maximum independence but intervene and supervise when necessary  - Involve family in performance of ADLs  - Assess for home care needs following discharge   - Consider OT consult to assist with ADL evaluation and planning for discharge  - Provide patient education as appropriate  Outcome: Progressing  Goal: Maintains/Returns to pre admission functional level  Description: INTERVENTIONS:  - Perform AM-PAC 6 Click Basic Mobility/ Daily Activity assessment daily.  - Set and communicate daily mobility goal to care team and patient/family/caregiver.   - Collaborate with rehabilitation services on mobility goals if consulted  - Perform Range of Motion 2 times a day.  - Reposition patient every 2 hours.  - Dangle patient 2 times a day  - Stand patient 2 times a day  - Ambulate patient 2 times a day  - Out of bed to chair 2 times a day   - Out of bed for meals 2 times a day  - Out of bed for toileting  - Record patient progress and toleration of activity level   Outcome: Progressing     Problem: DISCHARGE PLANNING  Goal: Discharge to home or other facility with appropriate resources  Description: INTERVENTIONS:  - Identify barriers to discharge w/patient and caregiver  - Arrange for needed discharge resources and transportation as appropriate  - Identify discharge learning needs (meds, wound care, etc.)  - Arrange for interpretive services to assist at discharge as needed  - Refer to Case Management Department for coordinating discharge planning if the patient needs post-hospital services based on  physician/advanced practitioner order or complex needs related to functional status, cognitive ability, or social support system  Outcome: Progressing     Problem: Knowledge Deficit  Goal: Patient/family/caregiver demonstrates understanding of disease process, treatment plan, medications, and discharge instructions  Description: Complete learning assessment and assess knowledge base.  Interventions:  - Provide teaching at level of understanding  - Provide teaching via preferred learning methods  Outcome: Progressing     Problem: MOBILITY - ADULT  Goal: Maintain or return to baseline ADL function  Description: INTERVENTIONS:  -  Assess patient's ability to carry out ADLs; assess patient's baseline for ADL function and identify physical deficits which impact ability to perform ADLs (bathing, care of mouth/teeth, toileting, grooming, dressing, etc.)  - Assess/evaluate cause of self-care deficits   - Assess range of motion  - Assess patient's mobility; develop plan if impaired  - Assess patient's need for assistive devices and provide as appropriate  - Encourage maximum independence but intervene and supervise when necessary  - Involve family in performance of ADLs  - Assess for home care needs following discharge   - Consider OT consult to assist with ADL evaluation and planning for discharge  - Provide patient education as appropriate  Outcome: Progressing  Goal: Maintains/Returns to pre admission functional level  Description: INTERVENTIONS:  - Perform AM-PAC 6 Click Basic Mobility/ Daily Activity assessment daily.  - Set and communicate daily mobility goal to care team and patient/family/caregiver.   - Collaborate with rehabilitation services on mobility goals if consulted  - Perform Range of Motion 2 times a day.  - Reposition patient every 2 hours.  - Dangle patient 2 times a day  - Stand patient 2 times a day  - Ambulate patient 2 times a day  - Out of bed to chair 2 times a day   - Out of bed for meals 2 times a  day  - Out of bed for toileting  - Record patient progress and toleration of activity level   Outcome: Progressing     Problem: Nutrition/Hydration-ADULT  Goal: Nutrient/Hydration intake appropriate for improving, restoring or maintaining nutritional needs  Description: Monitor and assess patient's nutrition/hydration status for malnutrition. Collaborate with interdisciplinary team and initiate plan and interventions as ordered.  Monitor patient's weight and dietary intake as ordered or per policy. Utilize nutrition screening tool and intervene as necessary. Determine patient's food preferences and provide high-protein, high-caloric foods as appropriate.     INTERVENTIONS:  - Monitor oral intake, urinary output, labs, and treatment plans  - Assess nutrition and hydration status and recommend course of action  - Evaluate amount of meals eaten  - Assist patient with eating if necessary   - Allow adequate time for meals  - Recommend/ encourage appropriate diets, oral nutritional supplements, and vitamin/mineral supplements  - Order, calculate, and assess calorie counts as needed  - Recommend, monitor, and adjust tube feedings and TPN/PPN based on assessed needs  - Assess need for intravenous fluids  - Provide specific nutrition/hydration education as appropriate  - Include patient/family/caregiver in decisions related to nutrition  Outcome: Progressing     Problem: Prexisting or High Potential for Compromised Skin Integrity  Goal: Skin integrity is maintained or improved  Description: INTERVENTIONS:  - Identify patients at risk for skin breakdown  - Assess and monitor skin integrity  - Assess and monitor nutrition and hydration status  - Monitor labs   - Assess for incontinence   - Turn and reposition patient  - Assist with mobility/ambulation  - Relieve pressure over bony prominences  - Avoid friction and shearing  - Provide appropriate hygiene as needed including keeping skin clean and dry  - Evaluate need for skin  moisturizer/barrier cream  - Collaborate with interdisciplinary team   - Patient/family teaching  - Consider wound care consult   Outcome: Progressing

## 2024-08-24 NOTE — ASSESSMENT & PLAN NOTE
Presents with right-sided facial droop and right arm weakness.  Her speech was also slurred at that time.  Her last known normal was 12 noon.  She was found in this state at 4:30 PM at [facility].  Stroke alert was called in the ER however not TNK candidate secondary to Eliquis use  CT head, CTA head and neck with no acute findings noted to explain symptoms  Patient was discussed with neurology, admitted for stroke pathway.  Neuro checks.  Telemetry.  Lab Results   Component Value Date    HGBA1C 6.8 (H) 08/23/2024     Lab Results   Component Value Date    CHOLESTEROL 139 08/23/2024    TRIG 158 (H) 08/23/2024    HDL 36 (L) 08/23/2024    LDLCALC 71 08/23/2024        Aspirin and statin.  Hold Eliquis  MRI brain with acute left paramedian pontine small infarct. No associated hemorrhage.   Echo completed, final reading pending   Neurology following, ordered CT head for Monday, if unchanged , can resume home eliquis in addition to aspirin   PT/OT - rehab

## 2024-08-24 NOTE — ASSESSMENT & PLAN NOTE
"Continue with lisinopril  Vitals as per unit protocol  /98 (BP Location: Left arm)   Pulse 89   Temp (!) 96.6 °F (35.9 °C) (Oral)   Resp 16   Ht 5' 7\" (1.702 m)   Wt 71.2 kg (157 lb)   SpO2 98%   BMI 24.59 kg/m²     "

## 2024-08-24 NOTE — SPEECH THERAPY NOTE
Speech Language/Pathology  Speech/Language Pathology  Assessment    Patient Name: Brianna Meyers  Today's Date: 8/24/2024     Problem List  Principal Problem:    Left pontine stroke (HCC)  Active Problems:    Type 2 diabetes mellitus without complication (HCC)    Essential hypertension    Pulmonary hypertension (HCC)    Persistent atrial fibrillation (HCC)    Mild cognitive impairment    Past Medical History  History reviewed. No pertinent past medical history.  Past Surgical History  History reviewed. No pertinent surgical history.      MOTOR SPEECH EVALUATION    Impressions:    Pt w/ mild dysarthria tish by slow rate and imprecise articulation resulting in mild to mild-mod reduced intelligibility. Reduced breath support for speech also intermittently noted. Most notable deficits occurred during phrases of increased length/sentence level speech, although mild articulation errors also occurred at single word level. Pt is unaware of deficits and does not attempt to self-correct at this time.       Therapy Prognosis: Fair   Prognosis considerations: Age, cognitive status, CVA     Goals:    LONG TERM GOALS:  -The patient will demonstrate intelligible speech in all activities of daily living including dynamic conversation  -The patient will independently utilize compensatory strategies to maximize communication in all ADLs.    SHORT TERM GOALS:    Respiration and Phonation  -Patient will utilize the strategy of increased respiratory support (ie “inhale more deeply”) before beginning utterances in conversational speech tasks on 9 of 10 trials with minimal cues      Articulation    -Patient will use trained strategies (eg slowed rate, over articulation, increased oral opening, writing key word, increased loudness, phrasing)  to improve speech intelligibility in word,  phrases, sentences and  conversation with 80% accuracy.     -Patient will discriminate between intelligible and unintelligible speech and use trained  strategies to repair communication (eg slowed rate, exaggerated articulation, repetition, rephrasal)         Patient's goal: none stated         H&P/Admit info, pertinent provider notes:(pmh noted above)  Pt is a 84 y.o. female w/ a PMHx significant for but not limited to HTN, Afib, presenting from [facility] with strokelike symptoms.  Last known normal was around noon.  Staff walked in around 430 and noted she had right-sided facial droop and right-sided weakness.  Patient was sent to the ER, stroke alert was called, not a TNK candidate secondary to Eliquis use. NIH of 3 on arrival.       Special Studies:    8/22/24 MRI brain wo contrast:  Acute left paramedian pontine small infarct. No associated hemorrhage.     Mild chronic microvascular ischemic change and additional chronic lacunar infarcts as described above.     8/22/24 CTA stroke alert (head/neck):   No acute intracranial abnormality.     Chronic microangiopathic ischemic changes.     Right parietotemporal craniotomy.      8/22/24 CT stroke alert brain:  CTA head:   -Negative for large vessel intracranial occlusion .   -Areas of stenosis involving the bilateral LAURA A2 segments, left MCA bifurcation, right V4 segment, and proximal right PCA P2 segment.     CTA neck:   -No extracranial carotid stenosis.   -Slightly lobulated contour of the extracranial ICA which can be seen with fibromuscular dysplasia (FMD).   -The cervical vertebral arteries are patent.      Did the pt report pain?   If yes, was nursing notified/was it addressed?    Precautions:    Evaluation:    Vowel prolongation: /a/ (Normal 15-20 seconds): 6     Diadochokinesis:    puh puh puh (normal 3-5.5 repetitions/second): WNL, 3    tuh tuh tuh (normal should exceed 25 reps in 10 seconds): WNL, 27     kuh kuh kuh (normal should exceed 25 reps in 10 seconds): WNL, 28    puh tuh kuh (or buttercup)- (normal should exceed 8 reps in 10 seconds): WNL, 9     Articulation:  Single words: mild imprecise      Multisyllabic words: mild-mod imprecise     Repetition of multisyllabic words: mod imprecise, worsened w/ ongoing trials     Words of progressive length: pt unable to complete task     Count 1-20, then backwards from 20-1: mild imprecise     Oral Motor Skills:  Facial symmetry: R facial droop at rest, symmetrical smile   Labial:  symmetrical, WFL ROM and strength   Lingual: WFL   Voice: mild weak      Oral apraxia:  No s/s     Articulation in connected speech: Variable imprecision ranging from mild to mild-mod    Conversation:  Imprecise articulation  Decreased breath support for speech  Slow rate

## 2024-08-24 NOTE — PROGRESS NOTES
"[facility]  Progress Note  Name: Brianna Meyers I  MRN: [id number]  Unit/Bed#: -[facility] I Date of Admission: 8/22/2024   Date of Service: 8/24/2024 I Hospital Day: 1    Assessment & Plan   Mild cognitive impairment  Assessment & Plan  Supportive care and redirection    Persistent atrial fibrillation (HCC)  Assessment & Plan  Rate controlled upon admission, in A-fib  Continue home digoxin  Eliquis on hold pending stroke workup    Pulmonary hypertension (HCC)  Assessment & Plan  Follow-up on echocardiogram  Respiratory status is stable    Essential hypertension  Assessment & Plan  Continue with lisinopril  Vitals as per unit protocol  /98 (BP Location: Left arm)   Pulse 89   Temp (!) 96.6 °F (35.9 °C) (Oral)   Resp 16   Ht 5' 7\" (1.702 m)   Wt 71.2 kg (157 lb)   SpO2 98%   BMI 24.59 kg/m²       Type 2 diabetes mellitus without complication (HCC)  Assessment & Plan  Lab Results   Component Value Date    HGBA1C 6.8 (H) 08/23/2024       Recent Labs     08/23/24  1711 08/23/24  2112 08/24/24  0721 08/24/24  1108   POCGLU 215* 161* 154* 173*       Blood Sugar Average: Last 72 hrs:  (P) 178.25    Start insulin sliding scale coverage while hospitalized  Adjust as needed   Carb consistent diet     * Left pontine stroke (HCC)  Assessment & Plan  Presents with right-sided facial droop and right arm weakness.  Her speech was also slurred at that time.  Her last known normal was 12 noon.  She was found in this state at 4:30 PM at [facility].  Stroke alert was called in the ER however not TNK candidate secondary to Eliquis use  CT head, CTA head and neck with no acute findings noted to explain symptoms  Patient was discussed with neurology, admitted for stroke pathway.  Neuro checks.  Telemetry.  Lab Results   Component Value Date    HGBA1C 6.8 (H) 08/23/2024     Lab Results   Component Value Date    CHOLESTEROL 139 08/23/2024    TRIG 158 (H) 08/23/2024    HDL 36 (L) " 2024    LDLCALC 71 2024        Aspirin and statin.  Hold Eliquis  MRI brain with acute left paramedian pontine small infarct. No associated hemorrhage.   Echo completed, final reading pending   Neurology following, ordered CT head for Monday, if unchanged , can resume home eliquis in addition to aspirin   PT/OT - rehab                VTE Pharmacologic Prophylaxis: VTE Score: 8 Moderate Risk (Score 3-4) - Pharmacological DVT Prophylaxis Ordered: enoxaparin (Lovenox).    Mobility:   Basic Mobility Inpatient Raw Score: 10  JH-HLM Goal: 4: Move to chair/commode  JH-HLM Achieved: 4: Move to chair/commode  JH-HLM Goal achieved. Continue to encourage appropriate mobility.    Patient Centered Rounds: I performed bedside rounds with nursing staff today.   Discussions with Specialists or Other Care Team Provider: yes    Education and Discussions with Family / Patient:  yes.     Total Time Spent on Date of Encounter in care of patient: 39 mins. This time was spent on one or more of the following: performing physical exam; counseling and coordination of care; obtaining or reviewing history; documenting in the medical record; reviewing/ordering tests, medications or procedures; communicating with other healthcare professionals and discussing with patient's family/caregivers.    Current Length of Stay: 1 day(s)  Current Patient Status: Inpatient   Certification Statement: The patient will continue to require additional inpatient hospital stay due to pending eval   Discharge Plan: Anticipate discharge in 48 hrs to rehab facility.    Code Status: Level 1 - Full Code    Subjective:   Seen and examined at bedside  Awake and alert  No complaints  No overnight events reported     Objective:     Vitals:   Temp (24hrs), Av °F (36.1 °C), Min:96.6 °F (35.9 °C), Max:97.4 °F (36.3 °C)    Temp:  [96.6 °F (35.9 °C)-97.4 °F (36.3 °C)] 96.6 °F (35.9 °C)  HR:  [69-92] 89  Resp:  [16-18] 16  BP: (127-181)/() 165/98  SpO2:   [97 %-98 %] 98 %  Body mass index is 24.59 kg/m².     Input and Output Summary (last 24 hours):     Intake/Output Summary (Last 24 hours) at 8/24/2024 1233  Last data filed at 8/24/2024 0917  Gross per 24 hour   Intake 716 ml   Output 1733 ml   Net -1017 ml       Physical Exam:   Physical Exam  Vitals reviewed.   Constitutional:       Appearance: Normal appearance.   HENT:      Head: Atraumatic.      Mouth/Throat:      Mouth: Mucous membranes are dry.   Cardiovascular:      Rate and Rhythm: Normal rate. Rhythm irregular.      Heart sounds: Normal heart sounds.   Pulmonary:      Effort: No respiratory distress.   Abdominal:      General: Bowel sounds are normal.   Musculoskeletal:      Right lower leg: No edema.      Left lower leg: No edema.   Skin:     Findings: Bruising present.   Neurological:      Mental Status: She is alert.      Motor: Weakness present.   Psychiatric:         Mood and Affect: Mood normal.          Additional Data:     Labs:  Results from last 7 days   Lab Units 08/24/24  0329   WBC Thousand/uL 8.32   HEMOGLOBIN g/dL 13.9   HEMATOCRIT % 41.0   PLATELETS Thousands/uL 250   SEGS PCT % 61   LYMPHO PCT % 26   MONO PCT % 9   EOS PCT % 2     Results from last 7 days   Lab Units 08/24/24  0329   SODIUM mmol/L 138   POTASSIUM mmol/L 3.7   CHLORIDE mmol/L 102   CO2 mmol/L 28   BUN mg/dL 15   CREATININE mg/dL 0.58*   ANION GAP mmol/L 8   CALCIUM mg/dL 10.0   GLUCOSE RANDOM mg/dL 160*     Results from last 7 days   Lab Units 08/22/24  1632   INR  1.28*     Results from last 7 days   Lab Units 08/24/24  1108 08/24/24  0721 08/23/24  2112 08/23/24  1711 08/23/24  1142 08/23/24  0855 08/22/24  2054 08/22/24  1629   POC GLUCOSE mg/dl 173* 154* 161* 215* 222* 164* 162* 175*     Results from last 7 days   Lab Units 08/23/24  0438   HEMOGLOBIN A1C % 6.8*           Lines/Drains:  Invasive Devices       Peripheral Intravenous Line  Duration             Peripheral IV 08/22/24 Right Antecubital 1 day                       Telemetry:  Telemetry Orders (From admission, onward)               24 Hour Telemetry Monitoring  (ED Bridging Orders Panel)  Continuous x 24 Hours (Telem)        Expiring   Question:  Reason for 24 Hour Telemetry  Answer:  TIA/Suspected CVA/ Confirmed CVA                     Telemetry Reviewed:  afib   Indication for Continued Telemetry Use: Acute CVA             Imaging: No pertinent imaging reviewed.    Recent Cultures (last 7 days):         Last 24 Hours Medication List:   Current Facility-Administered Medications   Medication Dose Route Frequency Provider Last Rate    acetaminophen  650 mg Oral Q6H PRN Chico Mathur PA-C      aspirin  81 mg Oral Daily Chico Mathur PA-C      atorvastatin  40 mg Oral QPM Chico Mathur PA-C      digoxin  187.5 mcg Oral Daily Chico Mathur PA-C      enoxaparin  40 mg Subcutaneous Daily Chico Mathur PA-C      insulin lispro  1-5 Units Subcutaneous TID AC Chico Mathur PA-C      insulin lispro  1-5 Units Subcutaneous HS Chico Mathur PA-C      labetalol  10 mg Intravenous Q6H PRN Chico Mathur PA-C      levothyroxine  50 mcg Oral Early Morning Chico Mathur PA-C      lisinopril  20 mg Oral Daily Shanta Duran MD      ondansetron  4 mg Intravenous Q6H PRN Chico Mathur PA-C      pantoprazole  40 mg Oral Daily Before Breakfast Chico Mathur PA-C          Today, Patient Was Seen By: Shanta Duran MD    **Please Note: This note may have been constructed using a voice recognition system.**

## 2024-08-24 NOTE — SPEECH THERAPY NOTE
"Speech Language/Pathology    Speech/Language Pathology Progress Note    Patient Name: Brianna Meyers  Today's Date: 8/24/2024     Problem List  Principal Problem:    Left pontine stroke (HCC)  Active Problems:    Type 2 diabetes mellitus without complication (HCC)    Essential hypertension    Pulmonary hypertension (HCC)    Atrial fibrillation (HCC)    Mild cognitive impairment       Past Medical History  History reviewed. No pertinent past medical history.     Past Surgical History  History reviewed. No pertinent surgical history.      Subjective:  Pt awake and alert, lunch tray at bedside. Noted at least mild dysarthria, formal evaluation to follow - see separate note.   \"Well it may be a bit worse\"     Current Diet:  Regular/thin     Objective:  Pt seen for dx dysphagia tx f/u to assess diet tolerance. Pt seen w/ regular texture cheeseburger and thin liquids via straw. Pt w/ rapid/somewhat impulsive intake, taking large bites despite cues. Prolonged mastication w/ disorganized bolus manipulation and formation. At times, incomplete bolus formation prior to transfer requiring recollection and secondary transfers. Pt eventually able to clear all material w/ extended time. Thin liquids drawn without difficulty via straw. Throat clear/weak cough x1 after sipping thin liquid with moderate amount of solid material in oral cavity. Suspect incoordination. No overt s/s aspiration w/ additional 8 oz. Education initiated on strategies to optimize swallow safety, including slowed rate and clearing oral cavity prior to introducing subsequent bolus. Pt verbalizes understanding though ?retention of education 2/2 known cognitive impairment.     Assessment:  Pt w/ s/s mild oropharyngeal dysphagia w/ increased risk of aspiration 2/2 impulsivity.     Plan/Recommendations:  Continue current diet  Close supervision/assist to maximize safety  Frequent/thorough oral care  ST to f/u as able/appropriate   "

## 2024-08-24 NOTE — ASSESSMENT & PLAN NOTE
Lab Results   Component Value Date    HGBA1C 6.8 (H) 08/23/2024       Recent Labs     08/23/24  1711 08/23/24  2112 08/24/24  0721 08/24/24  1108   POCGLU 215* 161* 154* 173*       Blood Sugar Average: Last 72 hrs:  (P) 178.25    Start insulin sliding scale coverage while hospitalized  Adjust as needed   Carb consistent diet

## 2024-08-25 LAB
ANION GAP SERPL CALCULATED.3IONS-SCNC: 8 MMOL/L (ref 4–13)
BACTERIA UR QL AUTO: ABNORMAL /HPF
BASOPHILS # BLD AUTO: 0.03 THOUSANDS/ÂΜL (ref 0–0.1)
BASOPHILS NFR BLD AUTO: 0 % (ref 0–1)
BILIRUB UR QL STRIP: NEGATIVE
BUN SERPL-MCNC: 19 MG/DL (ref 5–25)
CALCIUM SERPL-MCNC: 9.8 MG/DL (ref 8.4–10.2)
CHLORIDE SERPL-SCNC: 99 MMOL/L (ref 96–108)
CLARITY UR: ABNORMAL
CO2 SERPL-SCNC: 27 MMOL/L (ref 21–32)
COLOR UR: YELLOW
CREAT SERPL-MCNC: 0.71 MG/DL (ref 0.6–1.3)
EOSINOPHIL # BLD AUTO: 0.09 THOUSAND/ÂΜL (ref 0–0.61)
EOSINOPHIL NFR BLD AUTO: 1 % (ref 0–6)
ERYTHROCYTE [DISTWIDTH] IN BLOOD BY AUTOMATED COUNT: 14.5 % (ref 11.6–15.1)
GFR SERPL CREATININE-BSD FRML MDRD: 79 ML/MIN/1.73SQ M
GLUCOSE SERPL-MCNC: 147 MG/DL (ref 65–140)
GLUCOSE SERPL-MCNC: 158 MG/DL (ref 65–140)
GLUCOSE SERPL-MCNC: 164 MG/DL (ref 65–140)
GLUCOSE SERPL-MCNC: 174 MG/DL (ref 65–140)
GLUCOSE SERPL-MCNC: 224 MG/DL (ref 65–140)
GLUCOSE SERPL-MCNC: 240 MG/DL (ref 65–140)
GLUCOSE UR STRIP-MCNC: NEGATIVE MG/DL
HCT VFR BLD AUTO: 41.8 % (ref 34.8–46.1)
HGB BLD-MCNC: 14.2 G/DL (ref 11.5–15.4)
HGB UR QL STRIP.AUTO: ABNORMAL
IMM GRANULOCYTES # BLD AUTO: 0.06 THOUSAND/UL (ref 0–0.2)
IMM GRANULOCYTES NFR BLD AUTO: 1 % (ref 0–2)
KETONES UR STRIP-MCNC: NEGATIVE MG/DL
LEUKOCYTE ESTERASE UR QL STRIP: ABNORMAL
LYMPHOCYTES # BLD AUTO: 2.02 THOUSANDS/ÂΜL (ref 0.6–4.47)
LYMPHOCYTES NFR BLD AUTO: 24 % (ref 14–44)
MCH RBC QN AUTO: 30.7 PG (ref 26.8–34.3)
MCHC RBC AUTO-ENTMCNC: 34 G/DL (ref 31.4–37.4)
MCV RBC AUTO: 91 FL (ref 82–98)
MONOCYTES # BLD AUTO: 0.63 THOUSAND/ÂΜL (ref 0.17–1.22)
MONOCYTES NFR BLD AUTO: 8 % (ref 4–12)
MUCOUS THREADS UR QL AUTO: ABNORMAL
NEUTROPHILS # BLD AUTO: 5.54 THOUSANDS/ÂΜL (ref 1.85–7.62)
NEUTS SEG NFR BLD AUTO: 66 % (ref 43–75)
NITRITE UR QL STRIP: NEGATIVE
NON-SQ EPI CELLS URNS QL MICRO: ABNORMAL /HPF
NRBC BLD AUTO-RTO: 0 /100 WBCS
PH UR STRIP.AUTO: 7 [PH]
PLATELET # BLD AUTO: 248 THOUSANDS/UL (ref 149–390)
PMV BLD AUTO: 10.2 FL (ref 8.9–12.7)
POTASSIUM SERPL-SCNC: 4 MMOL/L (ref 3.5–5.3)
PROT UR STRIP-MCNC: NEGATIVE MG/DL
RBC # BLD AUTO: 4.62 MILLION/UL (ref 3.81–5.12)
RBC #/AREA URNS AUTO: ABNORMAL /HPF
SODIUM SERPL-SCNC: 134 MMOL/L (ref 135–147)
SP GR UR STRIP.AUTO: 1.01 (ref 1–1.03)
UROBILINOGEN UR STRIP-ACNC: <2 MG/DL
WBC # BLD AUTO: 8.37 THOUSAND/UL (ref 4.31–10.16)
WBC #/AREA URNS AUTO: ABNORMAL /HPF

## 2024-08-25 PROCEDURE — 80048 BASIC METABOLIC PNL TOTAL CA: CPT | Performed by: INTERNAL MEDICINE

## 2024-08-25 PROCEDURE — 81001 URINALYSIS AUTO W/SCOPE: CPT | Performed by: PHYSICIAN ASSISTANT

## 2024-08-25 PROCEDURE — 82948 REAGENT STRIP/BLOOD GLUCOSE: CPT

## 2024-08-25 PROCEDURE — 85025 COMPLETE CBC W/AUTO DIFF WBC: CPT | Performed by: INTERNAL MEDICINE

## 2024-08-25 PROCEDURE — 99232 SBSQ HOSP IP/OBS MODERATE 35: CPT | Performed by: INTERNAL MEDICINE

## 2024-08-25 RX ORDER — AMLODIPINE BESYLATE 5 MG/1
10 TABLET ORAL DAILY
Status: DISCONTINUED | OUTPATIENT
Start: 2024-08-25 | End: 2024-08-26 | Stop reason: HOSPADM

## 2024-08-25 RX ORDER — LISINOPRIL 20 MG/1
40 TABLET ORAL DAILY
Status: DISCONTINUED | OUTPATIENT
Start: 2024-08-25 | End: 2024-08-26 | Stop reason: HOSPADM

## 2024-08-25 RX ADMIN — ENOXAPARIN SODIUM 40 MG: 40 INJECTION SUBCUTANEOUS at 08:37

## 2024-08-25 RX ADMIN — INSULIN LISPRO 1 UNITS: 100 INJECTION, SOLUTION INTRAVENOUS; SUBCUTANEOUS at 08:40

## 2024-08-25 RX ADMIN — AMLODIPINE BESYLATE 10 MG: 5 TABLET ORAL at 08:37

## 2024-08-25 RX ADMIN — LISINOPRIL 40 MG: 20 TABLET ORAL at 08:37

## 2024-08-25 RX ADMIN — LEVOTHYROXINE SODIUM 50 MCG: 50 TABLET ORAL at 06:22

## 2024-08-25 RX ADMIN — INSULIN LISPRO 2 UNITS: 100 INJECTION, SOLUTION INTRAVENOUS; SUBCUTANEOUS at 21:48

## 2024-08-25 RX ADMIN — DIGOXIN 187.5 MCG: 125 TABLET ORAL at 08:37

## 2024-08-25 RX ADMIN — INSULIN LISPRO 2 UNITS: 100 INJECTION, SOLUTION INTRAVENOUS; SUBCUTANEOUS at 17:12

## 2024-08-25 RX ADMIN — INSULIN LISPRO 1 UNITS: 100 INJECTION, SOLUTION INTRAVENOUS; SUBCUTANEOUS at 12:29

## 2024-08-25 RX ADMIN — LABETALOL HYDROCHLORIDE 10 MG: 5 INJECTION, SOLUTION INTRAVENOUS at 12:29

## 2024-08-25 RX ADMIN — ATORVASTATIN CALCIUM 40 MG: 40 TABLET, FILM COATED ORAL at 17:17

## 2024-08-25 RX ADMIN — ASPIRIN 81 MG CHEWABLE TABLET 81 MG: 81 TABLET CHEWABLE at 08:37

## 2024-08-25 RX ADMIN — PANTOPRAZOLE SODIUM 40 MG: 40 TABLET, DELAYED RELEASE ORAL at 06:22

## 2024-08-25 NOTE — ASSESSMENT & PLAN NOTE
Lab Results   Component Value Date    HGBA1C 6.8 (H) 08/23/2024       Recent Labs     08/24/24  1108 08/24/24  1643 08/24/24 2025 08/25/24  0735   POCGLU 173* 180* 167* 158*       Blood Sugar Average: Last 72 hrs:  (P) 175.6541796478350886    Hemoglobin A1 c acceptable for her age

## 2024-08-25 NOTE — PLAN OF CARE
Problem: Potential for Falls  Goal: Patient will remain free of falls  Description: INTERVENTIONS:  - Educate patient/family on patient safety including physical limitations  - Instruct patient to call for assistance with activity   - Consult OT/PT to assist with strengthening/mobility   - Keep Call bell within reach  - Keep bed low and locked with side rails adjusted as appropriate  - Keep care items and personal belongings within reach  - Initiate and maintain comfort rounds  - Make Fall Risk Sign visible to staff  - Offer Toileting every 2 Hours, in advance of need  - Initiate/Maintain 2 alarm  - Obtain necessary fall risk management equipment: 2  - Apply yellow socks and bracelet for high fall risk patients  - Consider moving patient to room near nurses station  Outcome: Progressing     Problem: NEUROSENSORY - ADULT  Goal: Achieves stable or improved neurological status  Description: INTERVENTIONS  - Monitor and report changes in neurological status  - Monitor vital signs such as temperature, blood pressure, glucose, and any other labs ordered   - Initiate measures to prevent increased intracranial pressure  - Monitor for seizure activity and implement precautions if appropriate      Outcome: Progressing

## 2024-08-25 NOTE — PROGRESS NOTES
"[facility]  Progress Note  Name: Brianna Meyers I  MRN: [id number]  Unit/Bed#: -[facility] I Date of Admission: 8/22/2024   Date of Service: 8/25/2024 I Hospital Day: 2    Assessment & Plan   Mild cognitive impairment  Assessment & Plan  Supportive care and redirection    Persistent atrial fibrillation (HCC)  Assessment & Plan  Rate controlled upon admission, in A-fib  Continue home digoxin  Eliquis on hold pending stroke workup, if repeat CT head on Monday is unremarkable, reinitiate Eliquis    Pulmonary hypertension (HCC)  Assessment & Plan    Respiratory status is stable  Echo on 8/23 shows no concern for pulmonary arterial hypertension    Essential hypertension  Assessment & Plan  Continue with lisinopril, dose increased to 40 mg daily  Amlodipine 10 mg added  Vitals as per unit protocol  BP (!) 176/110   Pulse (!) 54   Temp (!) 96.8 °F (36 °C) (Axillary)   Resp 16   Ht 5' 7\" (1.702 m)   Wt 71.2 kg (157 lb)   SpO2 100%   BMI 24.59 kg/m²       Type 2 diabetes mellitus without complication (HCC)  Assessment & Plan  Lab Results   Component Value Date    HGBA1C 6.8 (H) 08/23/2024       Recent Labs     08/24/24  1108 08/24/24  1643 08/24/24 2025 08/25/24  0735   POCGLU 173* 180* 167* 158*       Blood Sugar Average: Last 72 hrs:  (P) 175.7348036235425750    Start insulin sliding scale coverage while hospitalized  Adjust as needed   Carb consistent diet     * Left pontine stroke (HCC)  Assessment & Plan  Presents with right-sided facial droop and right arm weakness.  Her speech was also slurred at that time.  Her last known normal was 12 noon.  She was found in this state at 4:30 PM at [facility].  Stroke alert was called in the ER however not TNK candidate secondary to Eliquis use  CT head, CTA head and neck with no acute findings noted to explain symptoms  Patient was discussed with neurology, admitted for stroke pathway.  Neuro checks.  Telemetry.  Lab Results "   Component Value Date    HGBA1C 6.8 (H) 2024     Lab Results   Component Value Date    CHOLESTEROL 139 2024    TRIG 158 (H) 2024    HDL 36 (L) 2024    LDLCALC 71 2024        Aspirin and statin.  Hold Eliquis  MRI brain with acute left paramedian pontine small infarct. No associated hemorrhage.   Echo completed, preserved EF, and bilateral atrial dilatation  Neurology following, ordered CT head for Monday, if unchanged , can resume home eliquis in addition to aspirin   PT/OT - rehab                VTE Pharmacologic Prophylaxis: VTE Score: 8 Moderate Risk (Score 3-4) - Pharmacological DVT Prophylaxis Ordered: enoxaparin (Lovenox).    Mobility:   Basic Mobility Inpatient Raw Score: 12  JH-HLM Goal: 4: Move to chair/commode  JH-HLM Achieved: 4: Move to chair/commode  JH-HLM Goal achieved. Continue to encourage appropriate mobility.    Patient Centered Rounds: I performed bedside rounds with nursing staff today.   Discussions with Specialists or Other Care Team Provider: yes    Education and Discussions with Family / Patient:  yes.     Total Time Spent on Date of Encounter in care of patient: 39  mins. This time was spent on one or more of the following: performing physical exam; counseling and coordination of care; obtaining or reviewing history; documenting in the medical record; reviewing/ordering tests, medications or procedures; communicating with other healthcare professionals and discussing with patient's family/caregivers.    Current Length of Stay: 2 day(s)  Current Patient Status: Inpatient   Certification Statement: Discharge tomorrow to rehab  Discharge Plan: Discharge tomorrow to rehab    Code Status: Level 1 - Full Code    Subjective:   Seen and examined at bedside  At baseline  Hard of hearing  Otherwise no complaints from    Objective:     Vitals:   Temp (24hrs), Av.3 °F (36.3 °C), Min:96.8 °F (36 °C), Max:97.6 °F (36.4 °C)    Temp:  [96.8 °F (36 °C)-97.6 °F (36.4 °C)]  96.8 °F (36 °C)  HR:  [54-89] 54  Resp:  [16-18] 16  BP: (162-192)/() 176/110  SpO2:  [95 %-100 %] 100 %  Body mass index is 24.59 kg/m².     Input and Output Summary (last 24 hours):     Intake/Output Summary (Last 24 hours) at 8/25/2024 1025  Last data filed at 8/25/2024 0747  Gross per 24 hour   Intake --   Output 167 ml   Net -167 ml       Physical Exam:   Physical Exam  Vitals reviewed.   HENT:      Head: Atraumatic.      Mouth/Throat:      Mouth: Mucous membranes are dry.   Cardiovascular:      Rate and Rhythm: Normal rate.      Heart sounds: Normal heart sounds.   Pulmonary:      Effort: No respiratory distress.   Abdominal:      General: Bowel sounds are normal.   Musculoskeletal:      Right lower leg: No edema.      Left lower leg: No edema.   Skin:     Findings: Bruising present.   Neurological:      Mental Status: She is alert and oriented to person, place, and time.      Motor: Weakness present.   Psychiatric:         Mood and Affect: Mood normal.          Additional Data:     Labs:  Results from last 7 days   Lab Units 08/25/24  0624   WBC Thousand/uL 8.37   HEMOGLOBIN g/dL 14.2   HEMATOCRIT % 41.8   PLATELETS Thousands/uL 248   SEGS PCT % 66   LYMPHO PCT % 24   MONO PCT % 8   EOS PCT % 1     Results from last 7 days   Lab Units 08/25/24  0624   SODIUM mmol/L 134*   POTASSIUM mmol/L 4.0   CHLORIDE mmol/L 99   CO2 mmol/L 27   BUN mg/dL 19   CREATININE mg/dL 0.71   ANION GAP mmol/L 8   CALCIUM mg/dL 9.8   GLUCOSE RANDOM mg/dL 174*     Results from last 7 days   Lab Units 08/22/24  1632   INR  1.28*     Results from last 7 days   Lab Units 08/25/24  0735 08/24/24  2025 08/24/24  1643 08/24/24  1108 08/24/24  0721 08/23/24  2112 08/23/24  1711 08/23/24  1142 08/23/24  0855 08/22/24 2054 08/22/24  1629   POC GLUCOSE mg/dl 158* 167* 180* 173* 154* 161* 215* 222* 164* 162* 175*     Results from last 7 days   Lab Units 08/23/24  0438   HEMOGLOBIN A1C % 6.8*           Lines/Drains:  Invasive Devices        Peripheral Intravenous Line  Duration             Peripheral IV 08/22/24 Right Antecubital 2 days                      Telemetry:  Telemetry Orders (From admission, onward)               24 Hour Telemetry Monitoring  (ED Bridging Orders Panel)  Continuous x 24 Hours (Telem)        Expiring   Question:  Reason for 24 Hour Telemetry  Answer:  TIA/Suspected CVA/ Confirmed CVA                     Telemetry Reviewed: Normal Sinus Rhythm  Indication for Continued Telemetry Use: Acute CVA             Imaging: Echo    Recent Cultures (last 7 days):         Last 24 Hours Medication List:   Current Facility-Administered Medications   Medication Dose Route Frequency Provider Last Rate    acetaminophen  650 mg Oral Q6H PRN Chico Mathur PA-C      amLODIPine  10 mg Oral Daily Shanta Duran MD      aspirin  81 mg Oral Daily Chico Mathur PA-C      atorvastatin  40 mg Oral QPM Chico Mathur PA-C      digoxin  187.5 mcg Oral Daily Chico Mathur PA-C      enoxaparin  40 mg Subcutaneous Daily Chico Mathur PA-C      insulin lispro  1-5 Units Subcutaneous TID AC Chico Mathur PA-C      insulin lispro  1-5 Units Subcutaneous HS Chico Mathur PA-C      labetalol  10 mg Intravenous Q6H PRN Chico Mathur PA-C      levothyroxine  50 mcg Oral Early Morning Chico Mathur PA-C      lisinopril  40 mg Oral Daily Shanta Duran MD      ondansetron  4 mg Intravenous Q6H PRN Chico Mathur PA-C      pantoprazole  40 mg Oral Daily Before Breakfast Chico Mathur PA-C          Today, Patient Was Seen By: Shanta Duran MD    **Please Note: This note may have been constructed using a voice recognition system.**

## 2024-08-25 NOTE — ASSESSMENT & PLAN NOTE
"Presents with right-sided facial droop and right arm weakness.  Her speech was also slurred at that time.  Her last known normal was 12 noon.  She was found in this state at 4:30 PM at [facility].  Stroke alert was called in the ER however not TNK candidate secondary to Eliquis use  CT head, CTA head and neck with no acute findings noted to explain symptoms  Lab Results   Component Value Date    HGBA1C 6.8 (H) 08/23/2024     Lab Results   Component Value Date    CHOLESTEROL 139 08/23/2024    TRIG 158 (H) 08/23/2024    HDL 36 (L) 08/23/2024    LDLCALC 71 08/23/2024     MRI brain with acute left paramedian pontine small infarct. No associated hemorrhage.   Echo completed, preserved EF, and bilateral atrial dilatation  Repeat CT head showed \" Evolving left paramedian pontine ischemia without secondary parenchymal hematoma\"   Per neurology recommendation continue aspirin, atorvastatin and Eliquis.  Follow-up with neurology clinic in 4 to 6 weeks  PT OT recommended rehab, patient will be discharged to rehab  Speech evaluated, patient had a VBS.  Per speech recommendation regular diet with nectar thick liquids.  "

## 2024-08-26 ENCOUNTER — APPOINTMENT (INPATIENT)
Dept: CT IMAGING | Facility: HOSPITAL | Age: 84
DRG: 065 | End: 2024-08-26
Payer: COMMERCIAL

## 2024-08-26 ENCOUNTER — APPOINTMENT (INPATIENT)
Dept: RADIOLOGY | Facility: HOSPITAL | Age: 84
DRG: 065 | End: 2024-08-26
Payer: COMMERCIAL

## 2024-08-26 VITALS
TEMPERATURE: 97.1 F | OXYGEN SATURATION: 95 % | HEIGHT: 67 IN | DIASTOLIC BLOOD PRESSURE: 72 MMHG | SYSTOLIC BLOOD PRESSURE: 110 MMHG | HEART RATE: 83 BPM | RESPIRATION RATE: 19 BRPM | WEIGHT: 157 LBS | BODY MASS INDEX: 24.64 KG/M2

## 2024-08-26 PROBLEM — I27.20 PULMONARY HYPERTENSION (HCC): Chronic | Status: RESOLVED | Noted: 2021-05-28 | Resolved: 2024-08-26

## 2024-08-26 LAB
ANION GAP SERPL CALCULATED.3IONS-SCNC: 8 MMOL/L (ref 4–13)
BASOPHILS # BLD AUTO: 0.04 THOUSANDS/ÂΜL (ref 0–0.1)
BASOPHILS NFR BLD AUTO: 1 % (ref 0–1)
BUN SERPL-MCNC: 23 MG/DL (ref 5–25)
CALCIUM SERPL-MCNC: 9.9 MG/DL (ref 8.4–10.2)
CHLORIDE SERPL-SCNC: 99 MMOL/L (ref 96–108)
CO2 SERPL-SCNC: 29 MMOL/L (ref 21–32)
CREAT SERPL-MCNC: 0.72 MG/DL (ref 0.6–1.3)
EOSINOPHIL # BLD AUTO: 0.13 THOUSAND/ÂΜL (ref 0–0.61)
EOSINOPHIL NFR BLD AUTO: 2 % (ref 0–6)
ERYTHROCYTE [DISTWIDTH] IN BLOOD BY AUTOMATED COUNT: 14.6 % (ref 11.6–15.1)
GFR SERPL CREATININE-BSD FRML MDRD: 78 ML/MIN/1.73SQ M
GLUCOSE SERPL-MCNC: 166 MG/DL (ref 65–140)
GLUCOSE SERPL-MCNC: 176 MG/DL (ref 65–140)
GLUCOSE SERPL-MCNC: 216 MG/DL (ref 65–140)
GLUCOSE SERPL-MCNC: 225 MG/DL (ref 65–140)
HCT VFR BLD AUTO: 40.5 % (ref 34.8–46.1)
HGB BLD-MCNC: 13.5 G/DL (ref 11.5–15.4)
IMM GRANULOCYTES # BLD AUTO: 0.04 THOUSAND/UL (ref 0–0.2)
IMM GRANULOCYTES NFR BLD AUTO: 1 % (ref 0–2)
LYMPHOCYTES # BLD AUTO: 1.88 THOUSANDS/ÂΜL (ref 0.6–4.47)
LYMPHOCYTES NFR BLD AUTO: 27 % (ref 14–44)
MCH RBC QN AUTO: 29.9 PG (ref 26.8–34.3)
MCHC RBC AUTO-ENTMCNC: 33.3 G/DL (ref 31.4–37.4)
MCV RBC AUTO: 90 FL (ref 82–98)
MONOCYTES # BLD AUTO: 0.59 THOUSAND/ÂΜL (ref 0.17–1.22)
MONOCYTES NFR BLD AUTO: 8 % (ref 4–12)
NEUTROPHILS # BLD AUTO: 4.36 THOUSANDS/ÂΜL (ref 1.85–7.62)
NEUTS SEG NFR BLD AUTO: 61 % (ref 43–75)
NRBC BLD AUTO-RTO: 0 /100 WBCS
PLATELET # BLD AUTO: 233 THOUSANDS/UL (ref 149–390)
PMV BLD AUTO: 10.4 FL (ref 8.9–12.7)
POTASSIUM SERPL-SCNC: 3.6 MMOL/L (ref 3.5–5.3)
RBC # BLD AUTO: 4.51 MILLION/UL (ref 3.81–5.12)
SODIUM SERPL-SCNC: 136 MMOL/L (ref 135–147)
WBC # BLD AUTO: 7.04 THOUSAND/UL (ref 4.31–10.16)

## 2024-08-26 PROCEDURE — 92507 TX SP LANG VOICE COMM INDIV: CPT

## 2024-08-26 PROCEDURE — 92611 MOTION FLUOROSCOPY/SWALLOW: CPT

## 2024-08-26 PROCEDURE — 80048 BASIC METABOLIC PNL TOTAL CA: CPT | Performed by: INTERNAL MEDICINE

## 2024-08-26 PROCEDURE — 70450 CT HEAD/BRAIN W/O DYE: CPT

## 2024-08-26 PROCEDURE — 97116 GAIT TRAINING THERAPY: CPT

## 2024-08-26 PROCEDURE — 92526 ORAL FUNCTION THERAPY: CPT

## 2024-08-26 PROCEDURE — 97530 THERAPEUTIC ACTIVITIES: CPT

## 2024-08-26 PROCEDURE — 74230 X-RAY XM SWLNG FUNCJ C+: CPT

## 2024-08-26 PROCEDURE — 99239 HOSP IP/OBS DSCHRG MGMT >30: CPT | Performed by: INTERNAL MEDICINE

## 2024-08-26 PROCEDURE — 85025 COMPLETE CBC W/AUTO DIFF WBC: CPT | Performed by: INTERNAL MEDICINE

## 2024-08-26 PROCEDURE — 82948 REAGENT STRIP/BLOOD GLUCOSE: CPT

## 2024-08-26 RX ORDER — AMLODIPINE BESYLATE 5 MG/1
5 TABLET ORAL DAILY
Start: 2024-08-27

## 2024-08-26 RX ORDER — LISINOPRIL 40 MG/1
40 TABLET ORAL DAILY
Start: 2024-08-27

## 2024-08-26 RX ORDER — ATORVASTATIN CALCIUM 40 MG/1
40 TABLET, FILM COATED ORAL EVERY EVENING
Start: 2024-08-26

## 2024-08-26 RX ORDER — ASPIRIN 81 MG/1
81 TABLET, CHEWABLE ORAL DAILY
Start: 2024-08-27

## 2024-08-26 RX ADMIN — ENOXAPARIN SODIUM 40 MG: 40 INJECTION SUBCUTANEOUS at 08:07

## 2024-08-26 RX ADMIN — LEVOTHYROXINE SODIUM 50 MCG: 50 TABLET ORAL at 06:10

## 2024-08-26 RX ADMIN — DIGOXIN 187.5 MCG: 125 TABLET ORAL at 08:07

## 2024-08-26 RX ADMIN — AMLODIPINE BESYLATE 10 MG: 5 TABLET ORAL at 08:07

## 2024-08-26 RX ADMIN — ASPIRIN 81 MG CHEWABLE TABLET 81 MG: 81 TABLET CHEWABLE at 08:07

## 2024-08-26 RX ADMIN — INSULIN LISPRO 1 UNITS: 100 INJECTION, SOLUTION INTRAVENOUS; SUBCUTANEOUS at 08:10

## 2024-08-26 RX ADMIN — INSULIN LISPRO 2 UNITS: 100 INJECTION, SOLUTION INTRAVENOUS; SUBCUTANEOUS at 17:28

## 2024-08-26 RX ADMIN — INSULIN LISPRO 2 UNITS: 100 INJECTION, SOLUTION INTRAVENOUS; SUBCUTANEOUS at 11:51

## 2024-08-26 RX ADMIN — APIXABAN 5 MG: 5 TABLET, FILM COATED ORAL at 17:29

## 2024-08-26 RX ADMIN — PANTOPRAZOLE SODIUM 40 MG: 40 TABLET, DELAYED RELEASE ORAL at 06:10

## 2024-08-26 RX ADMIN — ATORVASTATIN CALCIUM 40 MG: 40 TABLET, FILM COATED ORAL at 17:29

## 2024-08-26 RX ADMIN — LISINOPRIL 40 MG: 20 TABLET ORAL at 08:07

## 2024-08-26 NOTE — SPEECH THERAPY NOTE
"Speech Language/Pathology    Speech/Language Pathology Progress Note    Patient Name: Brianna Meyers  Today's Date: 8/26/2024     Problem List  Principal Problem:    Left pontine stroke (HCC)  Active Problems:    Type 2 diabetes mellitus without complication (HCC)    Essential hypertension    Pulmonary hypertension (HCC)    Persistent atrial fibrillation (HCC)    Mild cognitive impairment       Past Medical History  History reviewed. No pertinent past medical history.     Past Surgical History  History reviewed. No pertinent surgical history.      Updated History:  Pt last seen by SLP for completion of VFSS with recommendations of nectar thick liquids and regular textures, NO MIXED CONSISTENCIES.     Updated Imaging:  No new imaging.      AAO: person, place, month     Subjective:  Pt awake and alert upon arrival, agreeable to speech therapy at this time. Pt reports she feels like her speech \"is better\" but she wants people \"to understand [her]\"    Objective:  -Patient will utilize the strategy of increased respiratory support (ie “inhale more deeply”) before beginning utterances in conversational speech tasks on 9 of 10 trials with minimal cues   6/10 (60%) independently, increased to 8/10 (80%) w/ verbal cues    -Patient will use trained strategies (eg slowed rate, over articulation, increased oral opening, writing key word, increased loudness, phrasing)  to improve speech intelligibility in word,  phrases, sentences and  conversation with 80% accuracy.   6/10 (60%) independently, increased to 8/10 (80%) w/ verbal cues     -Patient will discriminate between intelligible and unintelligible speech and use trained strategies to repair communication (eg slowed rate, exaggerated articulation, repetition, rephrasal)   3/6 (50%) independently, did not benefit from verbal cues     Assessment:  Pt w/ mild dysarthria characterized by slow rate and imprecise articulation resulting in mild to mod reduced intelligibility. "     Plan/Recommendations:  Regular textures and nectar thick liquids  Meds whole w/ nectar thick liquids or whole w/ puree  Swallow strategies: upright posture, small bites/sips  Frequent/thorough oral care   ST to f/u for continued dysphagia and motor speech tx

## 2024-08-26 NOTE — OCCUPATIONAL THERAPY NOTE
"  Occupational Therapy Screen Note     Patient Name: Brianna Meyers  Today's Date: 8/26/2024  Problem List  Principal Problem:    Left pontine stroke (HCC)  Active Problems:    Type 2 diabetes mellitus without complication (HCC)    Essential hypertension    Pulmonary hypertension (HCC)    Persistent atrial fibrillation (HCC)    Mild cognitive impairment              08/26/24 1144   OT Last Visit   OT Visit Date 08/26/24   Note Type   Note Type Treatment   Pain Assessment   Pain Assessment Tool 0-10   Pain Score No Pain   Restrictions/Precautions   Weight Bearing Precautions Per Order No   Other Precautions Hard of hearing;Fall Risk;Chair Alarm;Bed Alarm;Cognitive   ADL   LB Dressing Assistance 2  Maximal Assistance   LB Dressing Deficit Thread RLE into underwear;Thread LLE into underwear;Pull up over hips   Toileting Assistance  1  Total Assistance   Toileting Deficit Increased time to complete;Perineal hygiene   Toileting Comments Incontinent of urine   Bed Mobility   Supine to Sit Unable to assess   Additional Comments Received OOB to recliner   Transfers   Sit to Stand 3  Moderate assistance   Additional items Assist x 2;Increased time required;Verbal cues;Armrests   Stand to Sit 3  Moderate assistance   Additional items Assist x 2;Increased time required;Verbal cues;Armrests   Additional Comments 3 sit <> stands completed. Initially without RW & utilized HHA, heavy R sided lean. Lean improved with use of RW for subsequent txfers   Functional Mobility   Functional Mobility 3  Moderate assistance   Additional Comments x2 - approx 8-10 ft with RW. BLE ataxia, Benefitted from verbal & tacitle cues for sequencing   Additional items Rolling walker   Subjective   Subjective \"They thought I was having a heart attack\"   Cognition   Overall Cognitive Status Impaired   Arousal/Participation Cooperative   Attention Attends with cues to redirect   Orientation Level Oriented to person;Oriented to place;Disoriented to " situation;Disoriented to time   Memory Decreased short term memory;Decreased recall of recent events   Following Commands Follows one step commands with increased time or repetition   Activity Tolerance   Activity Tolerance Patient tolerated treatment well   Medical Staff Made Aware PT FLOR Pisano Loraine   Assessment   Assessment Pt seen for OT tx session with focus on functional balance, functional mobility, ADL status, and transfer safety. Patient agreeable to OT treatment session. Pt received seated OOB to Recliner. Pt improved functional txfers/mobility to Mod Ax2 with use of RW. Pt continues to have R sided lean but noted to improve ability to maintain midline stance with use of RW. Pt required Total A for toileting & Max A for LB dressing. Pt does benefit from verbal & tactile cues. Patient continues to be functioning below baseline level, occupational performance remains limited secondary to factors listed above, and pt at increased risk for falls and injury.  The patient's raw score on the AM-PAC Daily Activity inpatient short form is 14, standardized score is 33.39, less than 39.4. Patients at this level are likely to benefit from DC to post-acute rehabilitation services. Please refer to the recommendation of the Occupational Therapist for safe DC planning.  From OT standpoint, recommendation at time of D/C would be DC with Level II - Moderate Rehab Resource Intensity resources. Patient to benefit from continued Occupational Therapy treatment while in the hospital to address deficits as defined above and maximize level of functional independence with ADLs and functional mobility. Pt left seated OOB to Recliner with call bell in reach, tray table in reach, needs met, chair alarm activated, RN informed.   Plan   Treatment Interventions ADL retraining;Functional transfer training;UE strengthening/ROM;Endurance training;Cognitive reorientation;Patient/family training;Equipment evaluation/education;Compensatory  technique education;Continued evaluation   Goal Expiration Date 09/06/24   OT Treatment Day 1   OT Frequency 3-5x/wk   Discharge Recommendation   Rehab Resource Intensity Level, OT II (Moderate Resource Intensity)   AM-PAC Daily Activity Inpatient   Lower Body Dressing 2   Bathing 2   Toileting 1   Upper Body Dressing 3   Grooming 3   Eating 3   Daily Activity Raw Score 14   Daily Activity Standardized Score (Calc for Raw Score >=11) 33.39   AM-PAC Applied Cognition Inpatient   Following a Speech/Presentation 2   Understanding Ordinary Conversation 4   Taking Medications 1   Remembering Where Things Are Placed or Put Away 2   Remembering List of 4-5 Errands 2   Taking Care of Complicated Tasks 1   Applied Cognition Raw Score 12   Applied Cognition Standardized Score 28.82   End of Consult   Education Provided Yes   Patient Position at End of Consult Bedside chair;All needs within reach;Bed/Chair alarm activated   Nurse Communication Nurse aware of consult       Kamila Jackson OTR/L

## 2024-08-26 NOTE — PHYSICAL THERAPY NOTE
"                                                                                  PHYSICAL THERAPY NOTE       08/26/24 1145   PT Last Visit   PT Visit Date 08/26/24   Note Type   Note Type Treatment   Pain Assessment   Pain Assessment Tool Dsouza-Baker FACES   Pain Score No Pain   Dsouza-Baker FACES Pain Rating 0   Restrictions/Precautions   Weight Bearing Precautions Per Order No   Other Precautions Hard of hearing;Fall Risk;Bed Alarm;Chair Alarm;Cognitive   General   Chart Reviewed Yes   Response to Previous Treatment Patient unable to report, no changes reported from family or staff   Family/Caregiver Present No   Cognition   Overall Cognitive Status Impaired   Arousal/Participation Cooperative   Attention Attends with cues to redirect   Memory Decreased short term memory;Decreased recall of recent events   Following Commands Follows one step commands with increased time or repetition   Subjective   Subjective \"Keep the lid on my dinner.\"   Bed Mobility   Additional Comments Received OOB in chair. Per RN- transferred via sit<>stand quick move   Transfers   Sit to Stand 3  Moderate assistance   Additional items Assist x 2   Stand to Sit 3  Moderate assistance   Additional items Assist x 2;Armrests;Verbal cues;Increased time required   Additional Comments Performed 3 sit<>stand transfers. 1st trial- stood briefly with heavy lateral lean towards the R. Incontinent of a large a amount of foul smelling urine.   Ambulation/Elevation   Gait pattern Ataxia;Foward flexed;Excessively slow;Step to;Narrow RHYS  (Slight lean towards the R. Ataxia RLE)   Gait Assistance 3  Moderate assist   Additional items Assist x 2;Verbal cues;Tactile cues  (Chair follow)   Assistive Device Rolling walker   Distance 10ft   Balance   Static Sitting Fair +   Dynamic Sitting Fair   Static Standing Poor +   Dynamic Standing Poor   Ambulatory Poor   Endurance Deficit   Endurance Deficit Yes   Endurance Deficit Description Easily fatigued   Activity " Tolerance   Activity Tolerance Patient limited by fatigue   Medical Staff Made Aware OTKamila   Nurse Made Aware RN, Loraine and PCAUmm. Made aware of functional status   Assessment   Prognosis Guarded   Problem List Decreased strength;Decreased endurance;Impaired balance;Decreased mobility;Impaired hearing;Decreased cognition   Assessment Patient was received OOB in chair. Per RN, transferred via quick move. Patient hard of hearing and needs increased verbal cues for command following. Patient required assistance of 2 for transfers. Incontinent of a large amount of urine. Patient did progress this session and had better standing tolerance and balance with a RW. Ambulated a short distance with assistance of 2 with a chair follow. Ataxic gait. Patient will continue to benefit from ongoing inpatient P.T. Recommending level 2 resources. Moderate intensity. The patient's [facility] Basic Mobility Inpatient Short Form Raw Score is 11. A Raw score of less than or equal to 17 suggests the patient may benefit from discharge to post-acute rehabilitation services. Please also refer to the recommendation of the Physical Therapist for safe discharge planning.   Barriers to Discharge Decreased caregiver support   Goals   Patient Goals None stated   [facility] Expiration Date 09/02/24   PT Treatment Day 1   Plan   Treatment/Interventions Functional transfer training;LE strengthening/ROM;Therapeutic exercise;Endurance training;Cognitive reorientation;Patient/family training;Equipment eval/education;Bed mobility;Gait training;Spoke to nursing;OT   Progress Progressing toward goals   PT Frequency 3-5x/wk   Discharge Recommendation   Rehab Resource Intensity Level, PT II (Moderate Resource Intensity)   AM-PAC Basic Mobility Inpatient   Turning in Flat Bed Without Bedrails 2   Lying on Back to Sitting on Edge of Flat Bed Without Bedrails 2   Moving Bed to Chair 2   Standing Up From Chair Using Arms 2   Walk in Room 2   Climb 3-5 Stairs With  Railing 1   Basic Mobility Inpatient Raw Score 11   Basic Mobility Standardized Score 30.25   Turning Head Towards Sound 3   Follow Simple Instructions 3   Low Function Basic Mobility Raw Score  17   Low Function Basic Mobility Standardized Score  27.46   [facility] Highest Level Of Mobility   -[facility] Goal 4: Move to chair/commode   -[facility] Achieved 6: Walk 10 steps or more   Education   Education Provided Mobility training;Assistive device   Patient Reinforcement needed   End of Consult   Patient Position at End of Consult Bedside chair;Bed/Chair alarm activated;All needs within reach   Yancy Penaloza            Patient Name: Brianna Meyers  Today's Date: 8/26/2024

## 2024-08-26 NOTE — PROCEDURES
Video Swallow Study      Patient Name: Brianna Meyers  Today's Date: 8/26/2024    Modified (Video) Barium Swallow Study    Summary:  Images are on PACS for review.     Pt presents w/ functional oral swallow skills, mild pharyngeal dysphagia.     Adequate labial seal for retrieval and containment of all materials. Timely rotary mastication of regular textures. Reduced bolus control w/ premature spill over BOT. WFL bolus transport. Reduced BOT retraction resulting in min BOT residual, ultimately functional. Delayed swallow initiation noted w/ bolus head in the pyriforms w/ thin liquids, bolus head in the valleculae w/ all other materials. Reduced laryngeal elevation, anterior hyoid excursion, and vestibule closure though noted adequate epiglottic inversion. Deep laryngeal penetration noted w/ sequential sip thin liquids before the swallow, w/ silent aspiration during the swallow. Pt w/ delayed throat clear as materials had passed visible tracheal rings, which did not eject materials from airway. Deep laryngeal penetration noted during the swallow w/ cup and straw sip thin liquids coating the vocal folds. SLP attempted use of strategies though pt unable to follow commands for effective use. No penetration or aspiration noted w/ any other materials administered. Adequate pharyngeal stripping wave though noted min pharyngeal residue in the valleculae across all materials, WFL. Slowed though eventual complete esophageal clearance.     Recommendations:  Diet: regular textures   Liquids: nectar thick liquids, NO MIXED CONSISTENCIES   Meds: whole in puree or w/ thickened liquid wash   Strategies: upright posture, small bites/sips  Frequent oral care  Upright position  F/u ST tx: as able and appropriate   Therapy Prognosis: fair   Prognosis considerations: cognitive deficits, advanced age  Aspiration Precautions  Results reviewed with: pt, nursing, physician  Aspiration precautions  posted.  Repeat MBS as necessary following dysphagia treatment w/ SLP  If a dedicated assessment of the esophagus is desired, consider esophagram/barium swallow or EGD.      Goals:  Pt will tolerate least restrictive diet w/out s/s aspiration or oral/pharyngeal difficulties.    H&P/pertinent provider notes: (PMH noted above)  Pt is a 84 y.o. female w/ a PMHx significant for but not limited to HTN, Afib, [facility] with strokelike symptoms.  Last known normal was around noon.  Staff walked in around 430 and noted she had right-sided facial droop and right-sided weakness.  Patient was sent to the ER, stroke alert was called, not a TNK candidate secondary to Eliquis use. NIH of 3 on arrival.     Special Studies:  8/26/24 CT head wo contrast:  Evolving left paramedian pontine ischemia without secondary parenchymal hematoma.     Stable chronic microvascular ischemic change and chronic lacunar infarcts described above.    8/22/24 MRI brain wo contrast:  Acute left paramedian pontine small infarct. No associated hemorrhage.     Mild chronic microvascular ischemic change and additional chronic lacunar infarcts as described above.     8/22/24 CTA stroke alert (head/neck):   No acute intracranial abnormality.     Chronic microangiopathic ischemic changes.     Right parietotemporal craniotomy.      8/22/24 CT stroke alert brain:  CTA head:   -Negative for large vessel intracranial occlusion .   -Areas of stenosis involving the bilateral LAURA A2 segments, left MCA bifurcation, right V4 segment, and proximal right PCA P2 segment.     CTA neck:   -No extracranial carotid stenosis.   -Slightly lobulated contour of the extracranial ICA which can be seen with fibromuscular dysplasia (FMD).   -The cervical vertebral arteries are patent.     Previous VBS:  N/A     Does the pt have pain? No  If yes, was nursing made aware/was it addressed? N/A       Swallow Information   Current Diet: regular diet and thin liquids    Baseline Diet: regular diet and thin liquids per pt's facesheet      Consistencies Administered:  Pt was viewed sitting upright in the lateral and AP positions. Trials administered 5-mL thin liquid x2, 20-mL cup sip thin, 40-mL sequential swallow thin, 5-mL nectar thick, 20-mL cup sip nectar thick, 40-mL sequential swallow nectar thick, 5-mL Honey thick, 5-mL pudding, ½ cookie coated with 3-mL pudding, second cup sip thin liquid. Pt was also given thin liquids by straw, as well as a barium tablet with pudding.       Oral Impairment:  Lip Closure: Adequate  Tongue Control During Bolus Hold: reduced w/ premature spill over BOT   Bolus Preparation/Mastication: Timely   Bolus Transport/Lingual Motion: WFL  Oral Residue: Min oral residue noted on BOT, WFL   Initiation of the Pharyngeal Swallow: delayed w/ bolus head in the pyriforms with thin liquids, bolus head in the valleculae w/ all other materials     Pharyngeal Impairment:  Soft Palate Elevation: Adequate   Laryngeal Elevation: reduced   Anterior Hyoid Excursion: reduced   Epiglottic Movement: ultimately adequate   Laryngeal Vestibular Closure: incomplete   Pharyngeal Stripping Wave: Adequate   PES Opening: WFL  Tongue Base Retraction: min reduced- ultimately functional   Pharyngeal Residue: min pharyngeal residue noted in the valleculae across all materials, WFL     Screening of Esophageal Impairment   Esophageal Clearance: slowed though eventual complete clearance       Penetration/Aspiration:  Thin: tspn- no penetration or aspiration (PAS-1), initial cup sip- penetration noted during the swallow w/ epiglottic undercoating (PAS-3), second cup sip- deep penetration noted during the swallow coating the vocal folds (PAS-5), sequential sip thin- deep penetration coating the vocal folds before the swallow (PAS-5) trace aspiration noted during the swallow (PAS-8) pt w/ delayed throat clear, straw sip thin- deep laryngeal penetration coating the vocal folds noted  during the swallow (PAS-5)  Nectar: no penetration or aspiration (PAS-1)  Honey: no penetration or aspiration (PAS-1)  Puree: no penetration or aspiration (PAS-1)  Solid: no penetration or aspiration (PAS-1)  Response to Aspiration: silent, pt w/ delayed throat clear x1   Strategies/Efficacy: attempted use of chin tuck though pt unable to adequately perform for effective use     8-Point Penetration-Aspiration Scale   1 Material does not enter the airway   2 Material enters the airway, remains above the vocal folds, and is ejected  from the  airway    3 Material enters the airway, remains above the vocal folds, and is not ejected from the airway   4 Material enters the airway, contacts the vocal folds, and is ejected from the airway   5 Material enters the airway, contacts the vocal folds, and is not ejected from the airway    6 Material enters the airway, passes below the vocal folds and is ejected into the larynx or out of the airway    7 Material enters the airway, passes below the vocal folds, and is not ejected from the trachea despite effort    8 Material enters the airway, passes below the vocal folds, and no effort is made to eject

## 2024-08-26 NOTE — DISCHARGE SUMMARY
"[facility]  Discharge- Brianna Meyers 1940, 84 y.o. female MRN: [id number]  Unit/Bed#: -[facility] Encounter: [id number]  Primary Care Provider: Rosario Shanks DO   Date and time admitted to hospital: 8/22/2024  4:26 PM    * Left pontine stroke (HCC)  Assessment & Plan  Presents with right-sided facial droop and right arm weakness.  Her speech was also slurred at that time.  Her last known normal was 12 noon.  She was found in this state at 4:30 PM at [facility].  Stroke alert was called in the ER however not TNK candidate secondary to Eliquis use  CT head, CTA head and neck with no acute findings noted to explain symptoms  Lab Results   Component Value Date    HGBA1C 6.8 (H) 08/23/2024     Lab Results   Component Value Date    CHOLESTEROL 139 08/23/2024    TRIG 158 (H) 08/23/2024    HDL 36 (L) 08/23/2024    LDLCALC 71 08/23/2024     MRI brain with acute left paramedian pontine small infarct. No associated hemorrhage.   Echo completed, preserved EF, and bilateral atrial dilatation  Repeat CT head showed \" Evolving left paramedian pontine ischemia without secondary parenchymal hematoma\"   Per neurology recommendation continue aspirin, atorvastatin and Eliquis.  Follow-up with neurology clinic in 4 to 6 weeks  PT OT recommended rehab, patient will be discharged to rehab  Speech evaluated, patient had a VBS.  Per speech recommendation regular diet with nectar thick liquids.    Mild cognitive impairment  Assessment & Plan  Supportive care and redirection    Persistent atrial fibrillation (HCC)  Assessment & Plan  Rate controlled upon admission, in A-fib  Continue home digoxin  Continue Eliquis     Essential hypertension  Assessment & Plan  Continue with lisinopril, dose increased to 40 mg daily  Amlodipine 5 mg      Type 2 diabetes mellitus without complication (HCC)  Assessment & Plan  Lab Results   Component Value Date    HGBA1C 6.8 (H) 08/23/2024       Recent " Labs     08/24/24  1108 08/24/24  1643 08/24/24  2025 08/25/24  0735   POCGLU 173* 180* 167* 158*       Blood Sugar Average: Last 72 hrs:  (P) [id number]    Hemoglobin A1 c acceptable for her age       Pulmonary hypertension (HCC)-resolved as of 8/26/2024  Assessment & Plan    Respiratory status is stable  Echo on 8/23 shows no concern for pulmonary arterial hypertension      Medical Problems       Resolved Problems  Date Reviewed: 8/25/2024   None       Discharging Physician / Practitioner: Rosana Pat MD  PCP: Rosario Shanks DO  Admission Date:   Admission Orders (From admission, onward)       Ordered        08/23/24 1815  INPATIENT ADMISSION  Once            08/22/24 1728  Place in Observation  Once                          Discharge Date: 08/26/24    Consultations During Hospital Stay:  Neurology, PT/OT, speech     Procedures Performed:   none    Significant Findings / Test Results:   CT head wo contrast  Result Date: 8/26/2024  Impression: Evolving left paramedian pontine ischemia without secondary parenchymal hematoma. Stable chronic microvascular ischemic change and chronic lacunar infarcts described above. Resident: Gus Matthews I, the attending radiologist, have reviewed the images and agree with the final report above.     MRI brain wo contrast  Result Date: 8/22/2024  Impression: Acute left paramedian pontine small infarct. No associated hemorrhage. Mild chronic microvascular ischemic change and additional chronic lacunar infarcts as described above. The study was marked in EPIC for immediate notification.     CTA stroke alert (head/neck)  Result Date: 8/22/2024  Impression: CTA head: -Negative for large vessel intracranial occlusion . -Areas of stenosis involving the bilateral LAURA A2 segments, left MCA bifurcation, right V4 segment, and proximal right PCA P2 segment. CTA neck: -No extracranial carotid stenosis. -Slightly lobulated contour of the extracranial ICA which  can be seen with fibromuscular dysplasia (FMD). -The cervical vertebral arteries are patent. Subcentimeter calcified left thyroid nodules. Subcentimeter lateral right upper lobe lung nodule. Based on current Fleischner Society 2017 Guidelines on incidental pulmonary nodule, no routine follow-up is needed if the patient is low risk. If the patient is high risk, optional follow-up chest CT at  12 months can be considered. Findings were directly discussed with Dr. Lane Weiner at 5:05 p.m.     CT stroke alert brain  Result Date: 8/22/2024  Impression: No acute intracranial abnormality. Chronic microangiopathic ischemic changes. Right parietotemporal craniotomy.       Incidental Findings:   none     Test Results Pending at Discharge (will require follow up):   none     Outpatient Tests Requested:  none    Complications:  none    Reason for Admission: Right-sided facial droop and right arm weakness.    Hospital Course:   Brianna Meyers is a 84 y.o. female patient who originally presented to the hospital on 8/22/2024 due to right-sided facial droop, right arm weakness, slurred speech.  She was admitted as a stroke alert.  MRI brain showed left paramedian pontine thrombotic infarct.  Patient was started on aspirin and statin, repeat CT head showed evolving left paramedian pontine infarct without secondary parenchymal hematoma.  Per neurology recommendation, patient's home Eliquis was resumed.  PT OT evaluated, recommended rehab.  Patient had VBS, upper speech recommendation patient will be discharged on regular texture with nectar thick liquids.    Patient will need to follow-up with neurology in 4 to 6 weeks.      Please see above list of diagnoses and related plan for additional information.     Condition at Discharge: good    Discharge Day Visit / Exam:   Subjective:  no complaints   Vitals: Blood Pressure: 110/72 (08/26/24 1451)  Pulse: 83 (08/26/24 1451)  Temperature: (!) 97.1 °F (36.2 °C) (08/26/24 1451)  Temp Source:  "Axillary (08/26/24 0042)  Respirations: 19 (08/26/24 1451)  Height: 5' 7\" (170.2 cm) (08/23/24 1450)  Weight - Scale: 71.2 kg (157 lb) (08/23/24 1450)  SpO2: 95 % (08/26/24 1451)  Exam:   Physical Exam  Vitals and nursing note reviewed.   Constitutional:       General: She is not in acute distress.     Appearance: She is not diaphoretic.   HENT:      Head: Normocephalic.   Eyes:      General: No scleral icterus.        Right eye: No discharge.         Left eye: No discharge.   Cardiovascular:      Rate and Rhythm: Normal rate and regular rhythm.   Pulmonary:      Effort: Pulmonary effort is normal. No respiratory distress.      Breath sounds: Normal breath sounds. No wheezing, rhonchi or rales.   Abdominal:      General: There is no distension.      Palpations: Abdomen is soft.      Tenderness: There is no abdominal tenderness. There is no guarding or rebound.   Musculoskeletal:      Cervical back: Normal range of motion.      Right lower leg: No edema.      Left lower leg: No edema.   Skin:     General: Skin is warm.   Neurological:      Mental Status: She is alert.   Psychiatric:         Mood and Affect: Mood normal.         Behavior: Behavior normal.          Discussion with Family: Updated  () at bedside.    Discharge instructions/Information to patient and family:   See after visit summary for information provided to patient and family.      Provisions for Follow-Up Care:  See after visit summary for information related to follow-up care and any pertinent home health orders.      Mobility at time of Discharge:   Basic Mobility Inpatient Raw Score: 11  -HLM Goal: 4: Move to chair/commode  JH-HLM Achieved: 6: Walk 10 steps or more  HLM Goal NOT achieved. Continue to encourage mobility in post discharge setting.     Disposition:   Acute Rehab at [facility]     Planned Readmission: no     Discharge Statement:  I spent 40 minutes discharging the patient. This time was spent on the day of " discharge. I had direct contact with the patient on the day of discharge. Greater than 50% of the total time was spent examining patient, answering all patient questions, arranging and discussing plan of care with patient as well as directly providing post-discharge instructions.  Additional time then spent on discharge activities.    Discharge Medications:  See after visit summary for reconciled discharge medications provided to patient and/or family.      **Please Note: This note may have been constructed using a voice recognition system**

## 2024-08-26 NOTE — PLAN OF CARE
Problem: Potential for Falls  Goal: Patient will remain free of falls  Description: INTERVENTIONS:  - Educate patient/family on patient safety including physical limitations  - Instruct patient to call for assistance with activity   - Consult OT/PT to assist with strengthening/mobility   - Keep Call bell within reach  - Keep bed low and locked with side rails adjusted as appropriate  - Keep care items and personal belongings within reach  - Initiate and maintain comfort rounds  - Make Fall Risk Sign visible to staff  - Offer Toileting every 2 Hours, in advance of need  - Initiate/Maintain 2 alarm  - Obtain necessary fall risk management equipment: 2  - Apply yellow socks and bracelet for high fall risk patients  - Consider moving patient to room near nurses station  Outcome: Progressing     Problem: NEUROSENSORY - ADULT  Goal: Remains free of injury related to seizures activity  Description: INTERVENTIONS  - Maintain airway, patient safety  and administer oxygen as ordered  - Monitor patient for seizure activity, document and report duration and description of seizure to physician/advanced practitioner  - If seizure occurs,  ensure patient safety during seizure  - Reorient patient post seizure  - Seizure pads on all 4 side rails  - Instruct patient/family to notify RN of any seizure activity including if an aura is experienced  - Instruct patient/family to call for assistance with activity based on nursing assessment  - Administer anti-seizure medications if ordered    Outcome: Progressing     Problem: DISCHARGE PLANNING  Goal: Discharge to home or other facility with appropriate resources  Description: INTERVENTIONS:  - Identify barriers to discharge w/patient and caregiver  - Arrange for needed discharge resources and transportation as appropriate  - Identify discharge learning needs (meds, wound care, etc.)  - Arrange for interpretive services to assist at discharge as needed  - Refer to Case Management  Department for coordinating discharge planning if the patient needs post-hospital services based on physician/advanced practitioner order or complex needs related to functional status, cognitive ability, or social support system  Outcome: Progressing

## 2024-08-26 NOTE — PLAN OF CARE
Problem: PHYSICAL THERAPY ADULT  Goal: Performs mobility at highest level of function for planned discharge setting.  See evaluation for individualized goals.  Description: Treatment/Interventions: Functional transfer training, LE strengthening/ROM, Therapeutic exercise, Endurance training, Cognitive reorientation, Patient/family training, Equipment eval/education, Bed mobility          See flowsheet documentation for full assessment, interventions and recommendations.  Outcome: Progressing  Note: Prognosis: Guarded  Problem List: Decreased strength, Decreased endurance, Impaired balance, Decreased mobility, Impaired hearing, Decreased cognition  Assessment: Patient was received OOB in chair. Per RN, transferred via quick move. Patient hard of hearing and needs increased verbal cues for command following. Patient required assistance of 2 for transfers. Incontinent of a large amount of urine. Patient did progress this session and had better standing tolerance and balance with a RW. Ambulated a short distance with assistance of 2 with a chair follow. Ataxic gait. Patient will continue to benefit from ongoing inpatient P.T. Recommending level 2 resources. Moderate intensity. The patient's -[facility] Basic Mobility Inpatient Short Form Raw Score is 11. A Raw score of less than or equal to 17 suggests the patient may benefit from discharge to post-acute rehabilitation services. Please also refer to the recommendation of the Physical Therapist for safe discharge planning.  Barriers to Discharge: Decreased caregiver support     Rehab Resource Intensity Level, PT: II (Moderate Resource Intensity)    See flowsheet documentation for full assessment.

## 2024-08-26 NOTE — UTILIZATION REVIEW
NOTIFICATION OF INPATIENT ADMISSION   AUTHORIZATION REQUEST   SERVICING FACILITY:   [place] [address]  Tax ID: [id number]  NPI: [id number] ATTENDING PROVIDER:  Attending Name and NPI#: Rosana Pat Md [[id number]]  Address: [address] [place] [address]  Phone: [phone]   ADMISSION INFORMATION:  Place of Service: Inpatient [facility]  Place of Service Code: 21  Inpatient Admission Date/Time: 8/23/24  6:15 PM  Discharge Date/Time: No discharge date for patient encounter.  Admitting Diagnosis Code/Description:  Slurred speech [R47.81]     UTILIZATION REVIEW CONTACT:  Cherry Cruz, Utilization   Network Utilization Review Department  Phone: [phone]  Fax: [phone]  Email: [email or web address]  Contact for approvals/pending authorizations, clinical reviews, and discharge.     PHYSICIAN ADVISORY SERVICES:  Medical Necessity Denial & Qixg-yj-Ymvb Review  Phone: [phone]  Fax: [phone]  Email: [email or web address]     DISCHARGE SUPPORT TEAM:  For Patients Discharge Needs & Updates  Phone: [phone] opt. 2 Fax: [phone]  Email: [email or web address]   Initial Clinical Review     Admission: Date/Time/Statement: 8/22/24 1728 observation and CHANGED 8/23/24 1815 INPATIENT RE: PATIENT NEEDS > 2 MIDNIGHT STAY DUE TO STROKE CONFIRMED BY MRI AND TO CONTINUE NEURO CHECKS, TELEMETRY, ON ASA.  ELIQUIS ON HOLD AND NEEDS FOLLOW UP CT BRAIN   Admission Orders (From admission, onward)          Ordered         08/23/24 1815   INPATIENT ADMISSION  Once             08/22/24 1728   Place in Observation  Once                                     Orders Placed This Encounter   Procedures    INPATIENT ADMISSION       Standing Status:   Standing       Number of Occurrences:   1       Order Specific Question:   Level of Care       Answer:   Med Surg [16]       Order Specific  Question:   Estimated length of stay       Answer:   More than 2 Midnights       Order Specific Question:   Certification       Answer:   I certify that inpatient services are medically necessary for this patient for a duration of greater than two midnights. See H&P and MD Progress Notes for additional information about the patient's course of treatment.      ED Arrival Information         Expected   -    Arrival   8/22/2024 16:20    Acuity   Emergent                 Means of arrival   Ambulance    Escorted by   [facility]    Service   Hospitalist    Admission type   Emergency                 Arrival complaint   Stroke Alert                     Chief Complaint   Patient presents with    CVA/TIA-like Symptoms       Pt was last seen normal at nursing home at 12:30pm. At 4pm, Nursing home staff noted slurred speech, right side weakness, and R facial droop and called EMS. EMS unable to place IV access. Pt  states she is on Eliquis         Initial Presentation: 84 y.o. female  to ED via EMS from nursing facility.    Admitted to observation AND CONVERTED TO INPATIENT with Dx: Stroke like symptoms/confirmed left pontine stroke .  Presented to ED with slurred speech and right sided weakness noticed about 20 minutes prior to arrival with last known wellness about 4 hours ago.  On Eliquis.  PMHx: atrial fib, hypertension, type 2 DM. On exam: rhythm irregular.  Mild slurred speech, right facial droop, slight decreased right hand  and right leg drift.  NIHSS 3.  Glucose 192.  INR 1.28. .   Imaging shows no acute findings on ct head.  Ecg atrial fib.   ED treatment:  stroke alert. Not a thrombolytic candidate due to eliquis .  Given IV Labetalol.    Plan includes to continue neuro checks, MRI brain, echo.  Telemetry.   Check lipid panel and A1c.  Consult neurology.  PT/OT.  Hold Eliquis. Continue home Dig.  Hold home lisinopril to allow for permissive hypertension.  Start SSI.      Per neurology 8/22/24:  stroke  alert and has possible acute left hemispheric lacunar infarct.  .  Not a candidate for TNK.  Recommend to hold Eliquis.  Start baby asa tomorrow.  MRI brain.  Telemetry. Echo.  Keep  - 180.  Neuro checks.      Anticipated Length of Stay/Certification Statement:  Patient will be admitted on an observation basis with an anticipated length of stay of less than 2 midnights secondary to strokelike symptoms, stroke rule out.     Date: 8/23/24    Day 2 CHANGED TO INPATIENT :   Certification Statement: The patient will continue to require additional inpatient hospital stay due to pending eval    8/23/24 INPATIENT:  has right sided lean with PT evaluation, past shooting w/ both L and R UE during gross movements like reaching.  Fatigued.   on exam:  minimal dysarthria.  Right deltoid 4+ power slight dysmetria on right upper extremity finger-nose testing. Mild ataxia bilateral lower extremities appears to be partly related to swelling in her knees bilaterally.  MRI small vessel left paramedian pontine thrombotic infarct, there appears to be 3 separate distinct spots or punctate strokes on the DWI sequence although on the ADC sequence all appear to be contiguous.  Continue neuro checks.  Echo  pending.   Eliquis on hold.  On baby asa.   Continue statin.  On SSI with accuchecks.   Continue telemetry.       8/23/24 per neurology - left pontine stroke, differential is atheroembolic stroke from right sided intracranial V4 segment stenosis, lower suspicion and unlikely is Eliquis failure.  Continue to hold Eliquis.  Continue baby asa.  Ct head on 8/26/24.  If stable can restart Eliquis.   Follow up in vascular clinic.       Patient has crossed 3 midnights and requires ongoing care    8/24/2024 .  Patient presents with  no complaints   On exam mucous membranes dry.  Rhythm irreg.  Bruising.  Weakness   Abnormal labs or imaging - MRI brain with acute left paramedian pontine small infarct. No associated hemorrhage    Diagnosis/Plan    Left pontine stroke -  continue neuro checks.  On asa and statin.   Echo.  Ct head 8/26, if unchanged then can restart Eliquis.   PT/OT recommend rehab.    Continue SSI,carb controlled diet.  BP with home lisinopril.  Telemetry.  On hold Dig             ED Triage Vitals   Temperature Pulse Respirations Blood Pressure SpO2 Pain Score   08/22/24 1652 08/22/24 1629 08/22/24 1629 08/22/24 1629 08/22/24 1629 08/22/24 1739   98.9 °F (37.2 °C) 102 20 (!) 186/120 98 % No Pain      Weight (last 2 days)         Date/Time Weight     08/23/24 1450 71.2 (157)     08/22/24 19:19:35 71.3 (157.19)     08/22/24 1652 71.2 (156.97)                Vital Signs (last 3 days)         Date/Time Temp Pulse Resp BP MAP (mmHg) SpO2 O2 Device Patient Position - Orthostatic VS Cammie Coma Scale Score Pain     08/24/24 11:11:32 -- 89 16 165/98 120 98 % None (Room air) Lying -- --     08/24/24 10:13:39 -- 69 -- 168/99 122 97 % -- -- -- --     08/24/24 09:17:48 -- 79 -- 174/103 127 97 % -- -- -- --     08/24/24 08:06:35 96.6 °F (35.9 °C) 74 16 181/103 129 97 % None (Room air) Lying -- --     08/24/24 07:25:10 -- -- 18 177/104 128 -- None (Room air) Lying -- --     08/23/24 21:18:40 97.4 °F (36.3 °C) 90 17 127/86 100 97 % None (Room air) Lying -- --     08/23/24 2036 -- -- -- -- -- -- -- -- -- No Pain     08/23/24 2000 -- -- -- -- -- -- -- -- 15 No Pain     08/23/24 1600 -- -- -- -- -- -- -- -- 15 --     08/23/24 1450 -- 92 -- 173/67 -- -- -- -- -- --     08/23/24 1317 -- -- -- -- -- -- -- -- -- No Pain     08/23/24 1314 -- -- -- -- -- -- -- -- -- No Pain     08/23/24 1200 -- -- -- -- -- -- -- -- 15 --     08/23/24 08:55:37 97.3 °F (36.3 °C) 72 -- 165/112 130 97 % -- -- -- --     08/23/24 0800 -- -- -- -- -- -- -- -- 14 No Pain     08/23/24 0608 -- -- -- -- -- -- -- -- 15 --     08/23/24 06:07:01 -- 57 -- 173/67 102 98 % -- -- -- --     08/23/24 04:27:55 -- 68 16 162/116 131 98 % -- Lying -- --     08/23/24 0420 -- -- -- -- --  -- -- -- 15 --     08/23/24 02:05:25 -- 67 16 141/82 102 97 % -- Lying -- --     08/23/24 0156 -- -- -- -- -- -- -- -- 15 --     08/22/24 23:56:22 97.7 °F (36.5 °C) 71 16 146/84 105 95 % -- Lying -- --     08/22/24 22:05:08 97.5 °F (36.4 °C) 61 16 143/107 119 97 % -- Lying -- No Pain     08/22/24 2158 -- -- -- -- -- -- -- -- 15 --     08/22/24 20:44:49 97.5 °F (36.4 °C) 68 -- 167/103 124 96 % -- -- -- --     08/22/24 2044 -- -- -- -- -- -- -- -- 15 --     08/22/24 19:48:51 97.5 °F (36.4 °C) 80 -- 173/100 124 97 % -- -- 14 --     08/22/24 19:19:35 97.5 °F (36.4 °C) 86 16 181/108 132 96 % None (Room air) Lying -- No Pain     08/22/24 18:46:53 97.3 °F (36.3 °C) 78 18 174/111 132 97 % -- -- -- --     08/22/24 1846 -- -- -- -- -- -- None (Room air) -- 14 No Pain     08/22/24 1745 -- 76 20 157/77 -- 97 % None (Room air) -- 14 No Pain     08/22/24 1739 -- -- -- -- -- -- -- -- -- No Pain     08/22/24 1730 -- 86 19 181/86 -- 97 % None (Room air) -- -- --     08/22/24 1715 -- 94 18 196/113 -- 97 % None (Room air) -- 14 --     08/22/24 1700 -- 106 20 198/96 -- 95 % None (Room air) -- -- --     08/22/24 1652 98.9 °F (37.2 °C) -- -- -- -- -- -- -- -- --     08/22/24 1645 -- 98 20 200/95 -- 95 % None (Room air) -- 14 --     08/22/24 1629 -- 102 20 186/120 -- 98 % None (Room air) -- -- --                Date and Time Eye Opening Best Verbal Response Best Motor Response Cammie Coma Scale Score   08/23/24 2000 4 5 6 15   08/23/24 1600 4 5 6 15   08/23/24 1200 4 5 6 15   08/23/24 0800 4 4 6 14   08/23/24 0608 4 5 6 15   08/23/24 0420 4 5 6 15   08/23/24 0156 4 5 6 15   08/22/24 2158 4 5 6 15   08/22/24 2044 4 5 6 15   08/22/24 1948 4 4 6 14   08/22/24 1846 4 4 6 14   08/22/24 1745 4 4 6 14   08/22/24 1715 4 4 6 14   08/22/24 1645 4 4 6 14      Pertinent Labs/Diagnostic Test Results:   Radiology:  MRI brain wo contrast   Final Interpretation by Nestor Mayfield MD (08/22 1909)       Acute left paramedian pontine small infarct. No associated  hemorrhage.       Mild chronic microvascular ischemic change and additional chronic lacunar infarcts as described above.       The study was marked in EPIC for immediate notification.       Workstation performed: [id number]           CT stroke alert brain   Final Interpretation by Jerson Olivarez MD (08/22 1068)       No acute intracranial abnormality.       Chronic microangiopathic ischemic changes.       Right parietotemporal craniotomy.               Workstation performed: [id number]           CTA stroke alert (head/neck)   Final Interpretation by Jerson Olivarez MD (08/22 6571)       CTA head:   -Negative for large vessel intracranial occlusion .   -Areas of stenosis involving the bilateral LAURA A2 segments, left MCA bifurcation, right V4 segment, and proximal right PCA P2 segment.       CTA neck:   -No extracranial carotid stenosis.   -Slightly lobulated contour of the extracranial ICA which can be seen with fibromuscular dysplasia (FMD).   -The cervical vertebral arteries are patent.       Subcentimeter calcified left thyroid nodules.       Subcentimeter lateral right upper lobe lung nodule. Based on current Fleischner Society 2017 Guidelines on incidental pulmonary nodule, no routine follow-up is needed if the patient is low risk. If the patient is high risk, optional follow-up chest CT at    12 months can be considered.           Findings were directly discussed with Dr. Lane Weiner at 5:05 p.m.       Workstation performed: [id number]           CT head wo contrast    (Results Pending)      Cardiology:  Echo complete w/ contrast if indicated   Final Result by German Bravo MD (08/23 1611)         Left Ventricle: Left ventricular cavity size is normal. Wall thickness    is moderately increased. The left ventricular ejection fraction is 60%.    Systolic function is normal. Wall motion is normal.     Left Atrium: The atrium is severely dilated.     Right Atrium: The atrium is moderately dilated.      Aortic Valve: There is mild regurgitation. There is aortic valve    sclerosis.     Mitral Valve: There is mild regurgitation.     Tricuspid Valve: There is mild regurgitation.     Aorta: The aortic root is mildly dilated. The ascending aorta is mildly    dilated.     Pulmonary Artery: The pulmonary artery systolic pressure is normal.           ECG 12 lead   Final Result by German Bravo MD (08/23 1232)   Atrial fibrillation   Nonspecific ST and T wave abnormality   Abnormal ECG   No previous ECGs available   Confirmed by German Bravo ([id number]) on 8/23/2024 12:32:28 PM               Results from last 7 days   Lab Units 08/22/24  1632   SARS-COV-2   Negative              Results from last 7 days   Lab Units 08/24/24  0329 08/23/24  0438 08/22/24  1942/24  1632   WBC Thousand/uL 8.32 7.62  --  9.65   HEMOGLOBIN g/dL 13.9 12.8  --  14.7   HEMATOCRIT % 41.0 38.2  --  42.7   PLATELETS Thousands/uL 250 236 239 252   TOTAL NEUT ABS Thousands/µL 5.16 4.48  --   --              Results from last 7 days   Lab Units 08/24/24  0329 08/23/24  0438 08/22/24  1632   SODIUM mmol/L 138 136 135   POTASSIUM mmol/L 3.7 3.6 4.6   CHLORIDE mmol/L 102 101 100   CO2 mmol/L 28 28 26   ANION GAP mmol/L 8 7 9   BUN mg/dL 15 17 20   CREATININE mg/dL 0.58* 0.57* 0.87   EGFR ml/min/1.73sq m 86 86 62   CALCIUM mg/dL 10.0 9.8 10.3*                  Results from last 7 days   Lab Units 08/24/24  1108 08/24/24  0721 08/23/24  2112 08/23/24  1711 08/23/24  1142 08/23/24  0855 08/22/24  2054 08/22/24  1629   POC GLUCOSE mg/dl 173* 154* 161* 215* 222* 164* 162* 175*             Results from last 7 days   Lab Units 08/24/24  0329 08/23/24  0438 08/22/24  1632   GLUCOSE RANDOM mg/dL 160* 137 192*           Results from last 7 days   Lab Units 08/23/24  0438   HEMOGLOBIN A1C % 6.8*   EAG mg/dl 148            Results from last 7 days   Lab Units 08/22/24  1942/24  1632   HS TNI 0HR ng/L  --  9   HS TNI 2HR ng/L 8  --    HSTNI D2 ng/L -1  --             Results from last 7 days   Lab Units 08/22/24  1632   PROTIME seconds 16.5*   INR   1.28*   PTT seconds 36*           Results from last 7 days   Lab Units 08/22/24  1632   INFLUENZA A PCR   Negative   INFLUENZA B PCR   Negative   RSV PCR   Negative            ED Treatment-Medication Administration from 08/22/2024 1619 to 08/22/2024 1836           Date/Time Order Dose Route Action       08/22/2024 1723 labetalol (NORMODYNE) injection 10 mg 10 mg Intravenous Given                Medical History   History reviewed. No pertinent past medical history.     Present on Admission:   Persistent atrial fibrillation (HCC)   Type 2 diabetes mellitus without complication (HCC)   Essential hypertension   Pulmonary hypertension (HCC)        Admitting Diagnosis: Slurred speech [R47.81]  Age/Sex: 84 y.o. female  Admission Orders:  Scheduled Medications:  aspirin, 81 mg, Oral, Daily  atorvastatin, 40 mg, Oral, QPM  digoxin, 187.5 mcg, Oral, Daily  enoxaparin, 40 mg, Subcutaneous, Daily  insulin lispro, 1-5 Units, Subcutaneous, TID AC  insulin lispro, 1-5 Units, Subcutaneous, HS  levothyroxine, 50 mcg, Oral, Early Morning  pantoprazole, 40 mg, Oral, Daily Before Breakfast     potassium chloride (Klor-Con M20) CR tablet 20 mEq  Dose: 20 mEq  Freq: Once Route: PO  Start: 08/23/24 0945 End: 08/23/24 1423      Continuous IV Infusions:  Infusions Meds - Displays dose, route, & frequency only         PRN Meds: not used.     PRN Medications - displays dose, route, and frequency   acetaminophen, 650 mg, Oral, Q6H PRN  labetalol, 10 mg, Intravenous, Q6H PRN  ondansetron, 4 mg, Intravenous, Q6H PRN      Telemetry  Neuro checks Every 1 hour x 4 hours, then every 2 hours x 8 hours, then every 4 hours x 72 hours   PT/OT/Speech      IP CONSULT TO NEUROLOGY

## 2024-08-26 NOTE — SPEECH THERAPY NOTE
Speech Language/Pathology    Speech/Language Pathology Progress Note    Patient Name: Brianna Meyers  Today's Date: 8/26/2024     Problem List  Principal Problem:    Left pontine stroke (HCC)  Active Problems:    Type 2 diabetes mellitus without complication (HCC)    Essential hypertension    Pulmonary hypertension (HCC)    Persistent atrial fibrillation (HCC)    Mild cognitive impairment       Past Medical History  History reviewed. No pertinent past medical history.     Past Surgical History  History reviewed. No pertinent surgical history.      Updated History:  Last seen by SLP 8/24 recommending regular textures and thin liquids as well as for a motor speech evaluation as pt presented w/ mild dysarthria.     Updated Imaging:  Pending CT head    AAO: person, place, month     Subjective:  S/w RN prior to re-evaluation stating no concerns at this time. Pt awake and alert upon arrival, agreeable to PO trials, OOB in chair. Pt denies difficulty swallowing (ie: coughing/choking, globus sensation).     Objective:  Materials administered: regular textures, thin liquids via cup and straw sip    Complete labial seal for retrieval and containment of all materials, no anterior bolus loss present. Timely rotary mastication of regular textures w/ complete bolus breakdown and adequate bolus transfer. Min oral residue noted on lingual surface of regular textures, cleared w/ liquid wash. Swallow initiation inconsistent w/ absent initiation to palpation x3 throughout session. When swallow initiation was present, suspected delayed w/ suspected reduced hyolaryngeal elevation. Throat clear x1 and weak cough x1 w/ thin liquids via cup sip, no overt s/s of aspiration w/ subsequent trials or other materials administered.     Assessment:  Pt w/ suspected mild oropharyngeal dysphagia as characterized by descriptions above.     Plan/Recommendations:  Continue current diet pending completion of VFSS   Swallow strategies: upright posture,  slow rate, small sips/bites   Frequent/thorough oral care   ST to f/u for completion of VFSS later this date as well as motor speech treatment as able and appropriate

## 2024-08-26 NOTE — PLAN OF CARE
Problem: Potential for Falls  Goal: Patient will remain free of falls  Description: INTERVENTIONS:  - Educate patient/family on patient safety including physical limitations  - Instruct patient to call for assistance with activity   - Consult OT/PT to assist with strengthening/mobility   - Keep Call bell within reach  - Keep bed low and locked with side rails adjusted as appropriate  - Keep care items and personal belongings within reach  - Initiate and maintain comfort rounds  - Make Fall Risk Sign visible to staff  - Offer Toileting every 2 Hours, in advance of need  - Initiate/Maintain 2 alarm  - Obtain necessary fall risk management equipment: 2  - Apply yellow socks and bracelet for high fall risk patients  - Consider moving patient to room near nurses station  Outcome: Progressing

## 2024-08-26 NOTE — DISCHARGE INSTR - DIET
Video Swallow Study        Patient Name: Brianna Meyers  Today's Date: 8/26/2024     Modified (Video) Barium Swallow Study     Summary:  Images are on PACS for review.      Pt presents w/ functional oral swallow skills, mild pharyngeal dysphagia.      Adequate labial seal for retrieval and containment of all materials. Timely rotary mastication of regular textures. Reduced bolus control w/ premature spill over BOT. WFL bolus transport. Reduced BOT retraction resulting in min BOT residual, ultimately functional. Delayed swallow initiation noted w/ bolus head in the pyriforms w/ thin liquids, bolus head in the valleculae w/ all other materials. Reduced laryngeal elevation, anterior hyoid excursion, and vestibule closure though noted adequate epiglottic inversion. Deep laryngeal penetration noted w/ sequential sip thin liquids before the swallow, w/ silent aspiration during the swallow. Pt w/ delayed throat clear as materials had passed visible tracheal rings, which did not eject materials from airway. Deep laryngeal penetration noted during the swallow w/ cup and straw sip thin liquids coating the vocal folds. SLP attempted use of strategies though pt unable to follow commands for effective use. No penetration or aspiration noted w/ any other materials administered. Adequate pharyngeal stripping wave though noted min pharyngeal residue in the valleculae across all materials, WFL. Slowed though eventual complete esophageal clearance.      Recommendations:  Diet: regular textures   Liquids: nectar thick liquids, NO MIXED CONSISTENCIES   Meds: whole in puree or w/ thickened liquid wash   Strategies: upright posture, small bites/sips  Frequent oral care  Upright position  F/u ST tx: as able and appropriate   Therapy Prognosis: fair   Prognosis considerations: cognitive deficits, advanced age  Aspiration Precautions  Results reviewed with: pt, nursing, physician  Aspiration precautions  posted.  Repeat MBS as necessary following dysphagia treatment w/ SLP  If a dedicated assessment of the esophagus is desired, consider esophagram/barium swallow or EGD.        Goals:  Pt will tolerate least restrictive diet w/out s/s aspiration or oral/pharyngeal difficulties.     H&P/pertinent provider notes: (PMH noted above)  Pt is a 84 y.o. female w/ a PMHx significant for but not limited to HTN, Afib, presenting from [facility] with strokelike symptoms.  Last known normal was around noon.  Staff walked in around 430 and noted she had right-sided facial droop and right-sided weakness.  Patient was sent to the ER, stroke alert was called, not a TNK candidate secondary to Eliquis use. NIH of 3 on arrival.      Special Studies:  8/26/24 CT head wo contrast:  Evolving left paramedian pontine ischemia without secondary parenchymal hematoma.     Stable chronic microvascular ischemic change and chronic lacunar infarcts described above.     8/22/24 MRI brain wo contrast:  Acute left paramedian pontine small infarct. No associated hemorrhage.     Mild chronic microvascular ischemic change and additional chronic lacunar infarcts as described above.     8/22/24 CTA stroke alert (head/neck):   No acute intracranial abnormality.     Chronic microangiopathic ischemic changes.     Right parietotemporal craniotomy.      8/22/24 CT stroke alert brain:  CTA head:   -Negative for large vessel intracranial occlusion .   -Areas of stenosis involving the bilateral LAURA A2 segments, left MCA bifurcation, right V4 segment, and proximal right PCA P2 segment.     CTA neck:   -No extracranial carotid stenosis.   -Slightly lobulated contour of the extracranial ICA which can be seen with fibromuscular dysplasia (FMD).   -The cervical vertebral arteries are patent.      Previous VBS:  N/A      Does the pt have pain? No  If yes, was nursing made aware/was it addressed? N/A         Swallow Information   Current Diet: regular diet and thin  liquids   Baseline Diet: regular diet and thin liquids per pt's facesheet        Consistencies Administered:  Pt was viewed sitting upright in the lateral and AP positions. Trials administered 5-mL thin liquid x2, 20-mL cup sip thin, 40-mL sequential swallow thin, 5-mL nectar thick, 20-mL cup sip nectar thick, 40-mL sequential swallow nectar thick, 5-mL Honey thick, 5-mL pudding, ½ cookie coated with 3-mL pudding, second cup sip thin liquid. Pt was also given thin liquids by straw, as well as a barium tablet with pudding.         Oral Impairment:  Lip Closure: Adequate  Tongue Control During Bolus Hold: reduced w/ premature spill over BOT   Bolus Preparation/Mastication: Timely   Bolus Transport/Lingual Motion: WFL  Oral Residue: Min oral residue noted on BOT, WFL   Initiation of the Pharyngeal Swallow: delayed w/ bolus head in the pyriforms with thin liquids, bolus head in the valleculae w/ all other materials      Pharyngeal Impairment:  Soft Palate Elevation: Adequate   Laryngeal Elevation: reduced   Anterior Hyoid Excursion: reduced   Epiglottic Movement: ultimately adequate   Laryngeal Vestibular Closure: incomplete   Pharyngeal Stripping Wave: Adequate   PES Opening: WFL  Tongue Base Retraction: min reduced- ultimately functional   Pharyngeal Residue: min pharyngeal residue noted in the valleculae across all materials, WFL      Screening of Esophageal Impairment   Esophageal Clearance: slowed though eventual complete clearance         Penetration/Aspiration:  Thin: tspn- no penetration or aspiration (PAS-1), initial cup sip- penetration noted during the swallow w/ epiglottic undercoating (PAS-3), second cup sip- deep penetration noted during the swallow coating the vocal folds (PAS-5), sequential sip thin- deep penetration coating the vocal folds before the swallow (PAS-5) trace aspiration noted during the swallow (PAS-8) pt w/ delayed throat clear, straw sip thin- deep laryngeal penetration coating the vocal  folds noted during the swallow (PAS-5)  Nectar: no penetration or aspiration (PAS-1)  Honey: no penetration or aspiration (PAS-1)  Puree: no penetration or aspiration (PAS-1)  Solid: no penetration or aspiration (PAS-1)  Response to Aspiration: silent, pt w/ delayed throat clear x1   Strategies/Efficacy: attempted use of chin tuck though pt unable to adequately perform for effective use          8-Point Penetration-Aspiration Scale   1 Material does not enter the airway   2 Material enters the airway, remains above the vocal folds, and is ejected  from the  airway    3 Material enters the airway, remains above the vocal folds, and is not ejected from the airway   4 Material enters the airway, contacts the vocal folds, and is ejected from the airway   5 Material enters the airway, contacts the vocal folds, and is not ejected from the airway    6 Material enters the airway, passes below the vocal folds and is ejected into the larynx or out of the airway    7 Material enters the airway, passes below the vocal folds, and is not ejected from the trachea despite effort    8 Material enters the airway, passes below the vocal folds, and no effort is made to eject

## 2024-08-26 NOTE — CASE MANAGEMENT
Case Management Discharge Planning Note    Patient name Brianna Meyers  Location /-01 MRN [id number]  : 1940 Date 2024       Current Admission Date: 2024  Current Admission Diagnosis:Left pontine stroke (HCC)   Patient Active Problem List    Diagnosis Date Noted Date Diagnosed    Type 2 diabetes mellitus without complication (HCC) 2024     Essential hypertension 2024     Persistent atrial fibrillation (HCC) 2024     Left pontine stroke (HCC) 2024     Mild cognitive impairment 2024     Mass of upper outer quadrant of left breast 2023     Chronic atrial fibrillation (HCC) 2021       LOS (days): 3  Geometric Mean LOS (GMLOS) (days): 2.9  Days to GMLOS:0     OBJECTIVE:  Risk of Unplanned Readmission Score: 10.36         Current admission status: Inpatient   Preferred Pharmacy:   UNKNOWN - FOLLOW UP PRIOR TO DISCHARGE TO E-PRESCRIBE  No address on file      Primary Care Provider: Rosario Shanks DO    Primary Insurance: [employer] [facility] [employer]  Secondary Insurance: [facility]    DISCHARGE DETAILS:                                                          Transport at Discharge : [facility]  Dispatcher Contacted: No  Number/Name of Dispatcher: [phone]  Transported by (Company and Unit #): [facility]  ETA of Transport (Date): 24  ETA of Transport (Time): 1830  Transport Service Arrived: No           IMM Given (Date):: 24  IMM Given to:: Family  IMM reviewed with patient and caregiver, patient and caregiver agrees with discharge determination.       Transportation claimed for 06:30pm via [employer]. CM informed pt, pt's spouse, attending, nurse and Jessica Villalobos. Medical Necessity in paper chart.

## 2024-08-26 NOTE — SPEECH THERAPY NOTE
"Speech Language/Pathology    Speech/Language Pathology Progress Note    Patient Name: Brianna Meyers  Today's Date: 8/26/2024     Problem List  Principal Problem:    Left pontine stroke (HCC)  Active Problems:    Type 2 diabetes mellitus without complication (HCC)    Essential hypertension    Pulmonary hypertension (HCC)    Persistent atrial fibrillation (HCC)    Mild cognitive impairment       Past Medical History  History reviewed. No pertinent past medical history.     Past Surgical History  History reviewed. No pertinent surgical history.    Updated History:  Pt last seen by SLP for completion of VFSS with recommendations of nectar thick liquids and regular textures, NO MIXED CONSISTENCIES.     Updated Imaging:  No new imaging.     AAO: person, place, date       Subjective:  S/w RN prior to dysphagia tx regarding results of VFSS. Pt awake and alert upon arrival, agreeable to PO trials and explanation of VFSS results. Pt denies concerns at this time.   \"Did I pass?\"     Objective:  Materials administered: regular textures, nectar thick liquids via cup.     Complete labial seal for retrieval and containment of all materials, no anterior bolus loss present. Timely rotary mastication of regular textures w/ complete bolus breakdown and adequate bolus transfer. No oral residue noted. Suspected delayed swallow initiation and reduced hyolaryngeal elevation to palpation. No overt s/s of aspiration at this time.     VBS findings and recommendations reviewed w/ pt w/ use of images to aid in understanding. Education provided on strategies to optimize swallow safety, including the importance of oral care and s/s aspiration to monitor for and notify medical team of should they arise. Pt verbalized understanding and denied questions at this time.     Assessment:  Pt presents w/ WFL oral swallow skills, and mild pharyngeal dysphagia.    Plan/Recommendations:  Regular textures and nectar thick liquids  Meds whole w/ nectar thick " liquids or whole w/ puree  Swallow strategies: upright posture, small bites/sips  Frequent/thorough oral care   ST to f/u for continued dysphagia and motor speech tx

## 2024-08-26 NOTE — PLAN OF CARE
Problem: OCCUPATIONAL THERAPY ADULT  Goal: Performs self-care activities at highest level of function for planned discharge setting.  See evaluation for individualized goals.  Description: Treatment Interventions: ADL retraining, Functional transfer training, UE strengthening/ROM, Endurance training, Cognitive reorientation, Patient/family training, Equipment evaluation/education, Compensatory technique education, Continued evaluation          See flowsheet documentation for full assessment, interventions and recommendations.   Outcome: Progressing  Note: Limitation: Decreased ADL status, Decreased UE strength, Decreased UE ROM, Decreased cognition, Decreased endurance, Decreased fine motor control, Decreased self-care trans, Decreased high-level ADLs  Prognosis: Fair  Assessment: Pt seen for OT tx session with focus on functional balance, functional mobility, ADL status, and transfer safety. Patient agreeable to OT treatment session. Pt received seated OOB to Recliner. Pt improved functional txfers/mobility to Mod Ax2 with use of RW. Pt continues to have R sided lean but noted to improve ability to maintain midline stance with use of RW. Pt required Total A for toileting & Max A for LB dressing. Pt does benefit from verbal & tactile cues. Patient continues to be functioning below baseline level, occupational performance remains limited secondary to factors listed above, and pt at increased risk for falls and injury.  The patient's raw score on the AM-PAC Daily Activity inpatient short form is 14, standardized score is 33.39, less than 39.4. Patients at this level are likely to benefit from DC to post-acute rehabilitation services. Please refer to the recommendation of the Occupational Therapist for safe DC planning.  From OT standpoint, recommendation at time of D/C would be DC with Level II - Moderate Rehab Resource Intensity resources. Patient to benefit from continued Occupational Therapy treatment while in  the hospital to address deficits as defined above and maximize level of functional independence with ADLs and functional mobility. Pt left seated OOB to Recliner with call bell in reach, tray table in reach, needs met, chair alarm activated, RN informed.     Rehab Resource Intensity Level, OT: II (Moderate Resource Intensity)

## 2024-08-26 NOTE — PHYSICAL THERAPY NOTE
"                                                                                  PHYSICAL THERAPY NOTE     08/26/24 1145   PT Last Visit   PT Visit Date 08/26/24   Note Type   Note Type Treatment   Pain Assessment   Pain Assessment Tool Dsouza-Baker FACES   Pain Score No Pain   Dsouza-Baker FACES Pain Rating 0   Restrictions/Precautions   Weight Bearing Precautions Per Order No   Other Precautions Hard of hearing;Fall Risk;Bed Alarm;Chair Alarm;Cognitive   General   Chart Reviewed Yes   Response to Previous Treatment Patient unable to report, no changes reported from family or staff   Family/Caregiver Present No   Cognition   Overall Cognitive Status Impaired   Arousal/Participation Cooperative   Attention Attends with cues to redirect   Memory Decreased short term memory;Decreased recall of recent events   Following Commands Follows one step commands with increased time or repetition   Subjective   Subjective \"Keep the lid on my dinner.\"   Bed Mobility   Additional Comments Received OOB in chair. Per RN- transferred via sit<>stand quick move   Transfers   Sit to Stand 3  Moderate assistance   Additional items Assist x 2   Stand to Sit 3  Moderate assistance   Additional items Assist x 2;Armrests;Verbal cues;Increased time required   Additional Comments Performed 3 sit<>stand transfers. 1st trial- stood briefly with heavy lateral lean towards the R. Incontinent of a large a amount of foul smelling urine.   Ambulation/Elevation   Gait pattern Ataxia;Foward flexed;Excessively slow;Step to;Narrow RHYS  (Slight lean towards the R. Ataxia RLE)   Gait Assistance 3  Moderate assist   Additional items Assist x 2;Verbal cues;Tactile cues  (Chair follow)   Assistive Device Rolling walker   Distance 10ft   Balance   Static Sitting Fair +   Dynamic Sitting Fair   Static Standing Poor +   Dynamic Standing Poor   Ambulatory Poor   Endurance Deficit   Endurance Deficit Yes   Endurance Deficit Description Easily fatigued   Activity " Tolerance   Activity Tolerance Patient limited by fatigue   Medical Staff Made Aware OTKamila   Nurse Made Aware RN, Loraine and PCAUmm. Made aware of functional status   Assessment   Prognosis Guarded   Problem List Decreased strength;Decreased endurance;Impaired balance;Decreased mobility;Impaired hearing;Decreased cognition   Assessment Patient was received OOB in chair. Per RN, transferred via quick move. Patient hard of hearing and needs increased verbal cues for command following. Patient required assistance of 2 for transfers. Incontinent of a large amount of urine. Patient did progress this session and had better standing tolerance and balance with a RW. Ambulated a short distance with assistance of 2 with a chair follow. Ataxic gait. Patient will continue to benefit from ongoing inpatient P.T. Recommending level 2 resources. Moderate intensity. [facility] Raw Score is 11. A Raw score of less than or equal to 17 suggests the patient may benefit from discharge to post-acute rehabilitation services. Please also refer to the recommendation of the Physical Therapist for safe discharge planning.   Barriers to Discharge Decreased caregiver support   Goals   Patient Goals None stated   [facility] Expiration Date 09/02/24   PT Treatment Day 1   Plan   Treatment/Interventions Functional transfer training;LE strengthening/ROM;Therapeutic exercise;Endurance training;Cognitive reorientation;Patient/family training;Equipment eval/education;Bed mobility;Gait training;Spoke to nursing;OT   Progress Progressing toward goals   PT Frequency 3-5x/wk   Discharge Recommendation   Rehab Resource Intensity Level, PT II (Moderate Resource Intensity)   AM-PAC Basic Mobility Inpatient   Turning in Flat Bed Without Bedrails 2   Lying on Back to Sitting on Edge of Flat Bed Without Bedrails 2   Moving Bed to Chair 2   Standing Up From Chair Using Arms 2   Walk in Room 2   Climb 3-5 Stairs With  Railing 1   Basic Mobility Inpatient Raw Score 11   Basic Mobility Standardized Score 30.25   Turning Head Towards Sound 3   Follow Simple Instructions 3   Low Function Basic Mobility Raw Score  17   Low Function Basic Mobility Standardized Score  27.46   [facility] Highest Level Of Mobility   -[facility] Goal 4: Move to chair/commode   -[facility] Achieved 6: Walk 10 steps or more   Education   Education Provided Mobility training;Assistive device   Patient Reinforcement needed   End of Consult   Patient Position at End of Consult Bedside chair;Bed/Chair alarm activated;All needs within reach   Yancy Penaloza            Patient Name: Brianna Meyers  Today's Date: 8/26/2024

## 2024-08-26 NOTE — PLAN OF CARE
Problem: PHYSICAL THERAPY ADULT  Goal: Performs mobility at highest level of function for planned discharge setting.  See evaluation for individualized goals.  Description: Treatment/Interventions: Functional transfer training, LE strengthening/ROM, Therapeutic exercise, Endurance training, Cognitive reorientation, Patient/family training, Equipment eval/education, Bed mobility          See flowsheet documentation for full assessment, interventions and recommendations.  8/26/2024 1209 by Yancy Penaloza, PT  Outcome: Progressing  Note: Prognosis: Guarded  Problem List: Decreased strength, Decreased endurance, Impaired balance, Decreased mobility, Impaired hearing, Decreased cognition  Assessment: Patient was received OOB in chair. Per RN, transferred via quick move. Patient hard of hearing and needs increased verbal cues for command following. Patient required assistance of 2 for transfers. Incontinent of a large amount of urine. Patient did progress this session and had better standing tolerance and balance with a RW. Ambulated a short distance with assistance of 2 with a chair follow. Ataxic gait. Patient will continue to benefit from ongoing inpatient P.T. Recommending level 2 resources. Moderate intensity. The patient's -[facility] Basic Mobility Inpatient Short Form Raw Score is 11. A Raw score of less than or equal to 17 suggests the patient may benefit from discharge to post-acute rehabilitation services. Please also refer to the recommendation of the Physical Therapist for safe discharge planning.  Barriers to Discharge: Decreased caregiver support     Rehab Resource Intensity Level, PT: II (Moderate Resource Intensity)    See flowsheet documentation for full assessment.     8/26/2024 1204 by Yancy Penaloza, PT  Outcome: Progressing  Note: Prognosis: Guarded  Problem List: Decreased strength, Decreased endurance, Impaired balance, Decreased mobility, Impaired hearing, Decreased cognition  Assessment: Patient  was received OOB in chair. Per RN, transferred via quick move. Patient hard of hearing and needs increased verbal cues for command following. Patient required assistance of 2 for transfers. Incontinent of a large amount of urine. Patient did progress this session and had better standing tolerance and balance with a RW. Ambulated a short distance with assistance of 2 with a chair follow. Ataxic gait. Patient will continue to benefit from ongoing inpatient P.T. Recommending level 2 resources. Moderate intensity. The patient's AM-PAC Basic Mobility Inpatient Short Form Raw Score is 11. A Raw score of less than or equal to 17 suggests the patient may benefit from discharge to post-acute rehabilitation services. Please also refer to the recommendation of the Physical Therapist for safe discharge planning.  Barriers to Discharge: Decreased caregiver support     Rehab Resource Intensity Level, PT: II (Moderate Resource Intensity)    See flowsheet documentation for full assessment.

## 2024-08-27 NOTE — CASE MANAGEMENT
[facility] received APPROVAL for transport authorization.  Insurance: [place] [id number]  Type of transport: [facility] CPT:   Date of transport: 8/26   End Location: [facility]   Medical Necessity: Assist x2 for transfer, Unable to tolerate wheelchair  Transport Company: [employer] NPI: [id number]  Approved auth #: [id number]     Care Manager notified: Kendrick LEAL     Please reach out to  for updates on any clinical information.     Will have to wait for Margoth from [facility] to call back with 2 nd request and to check her phone numbers again.

## 2024-08-29 NOTE — UTILIZATION REVIEW
NOTIFICATION OF ADMISSION DISCHARGE   This is a Notification of Discharge from [facility]. Please be advised that this patient has been discharge from our facility. Below you will find the admission and discharge date and time including the patient’s disposition.   UTILIZATION REVIEW CONTACT:  Cherry Cruz  Utilization   Network Utilization Review Department  Phone: [phone] x carefully listen to the prompts. All voicemails are confidential.  Email: [email or web address]     ADMISSION INFORMATION  PRESENTATION DATE: 8/22/2024  4:26 PM  OBERVATION ADMISSION DATE: 08/22/2024 1728  INPATIENT ADMISSION DATE: 8/23/24  6:15 PM   DISCHARGE DATE: 8/26/2024  7:45 PM   DISPOSITION:Discharged/Transferred to Long Term Care/Personal Care Home/Assisted Living    Network Utilization Review Department  ATTENTION: Please call with any questions or concerns to [phone] and carefully listen to the prompts so that you are directed to the right person. All voicemails are confidential.   For Discharge needs, contact Care Management DC Support Team at [phone] opt. 2  Send all requests for admission clinical reviews, approved or denied determinations and any other requests to dedicated fax number below belonging to the campus where the patient is receiving treatment. List of dedicated fax numbers for the Facilities:  FACILITY NAME UR FAX NUMBER   ADMISSION DENIALS (Administrative/Medical Necessity) [phone]   DISCHARGE SUPPORT TEAM ([facility]) [phone]   PARENT CHILD HEALTH (Maternity/NICU/Pediatrics) [phone]   [facility] [phone]   [facility] [phone]   [facility] [phone]   [facility] [phone]   [facility] [phone]   [facility] [phone]   [facility]  [phone]   [facility] [phone]   [facility] [phone]   [facility] [phone]   [facility] [phone]   [facility] [phone]        [facility]  Discharge- Brianna Cooperter 1940, 84 y.o. female MRN: [id number]  Unit/Bed#: -01 Encounter: [id number]  Primary Care Provider: Rosario Shanks DO   Date and time admitted to hospital: 8/22/2024  4:26 PM     * Left pontine stroke (HCC)  Assessment & Plan  Presents with right-sided facial droop and right arm weakness.  Her speech was also slurred at that time.  Her last known normal was 12 noon.  She was found in this state at 4:30 PM at [facility].  Stroke alert was called in the ER however not TNK candidate secondary to Eliquis use  CT head, CTA head and neck with no acute findings noted to explain symptoms        Lab Results   Component Value Date     HGBA1C 6.8 (H) 08/23/2024            Lab Results   Component Value Date     CHOLESTEROL 139 08/23/2024     TRIG 158 (H) 08/23/2024     HDL 36 (L) 08/23/2024     LDLCALC 71 08/23/2024      MRI brain with acute left paramedian pontine small infarct. No associated hemorrhage.   Echo completed, preserved EF, and bilateral atrial dilatation  Repeat CT head showed \" Evolving left paramedian pontine ischemia without secondary parenchymal hematoma\"   Per neurology recommendation continue aspirin, atorvastatin and Eliquis.  Follow-up with neurology clinic in 4 to 6 weeks  PT OT recommended rehab, patient will be discharged to rehab  Speech evaluated, patient had a VBS.  Per speech recommendation regular diet with nectar thick liquids.     Mild cognitive impairment  Assessment & Plan  Supportive care and redirection     Persistent atrial fibrillation (HCC)  Assessment & Plan  Rate controlled upon " admission, in A-fib  Continue home digoxin  Continue Eliquis      Essential hypertension  Assessment & Plan  Continue with lisinopril, dose increased to 40 mg daily  Amlodipine 5 mg        Type 2 diabetes mellitus without complication (HCC)  Assessment & Plan        Lab Results   Component Value Date     HGBA1C 6.8 (H) 08/23/2024                Recent Labs     08/24/24  1108 08/24/24  1643 08/24/24 2025 08/25/24  0735   POCGLU 173* 180* 167* 158*         Blood Sugar Average: Last 72 hrs:  (P) [id number]     Hemoglobin A1 c acceptable for her age         Pulmonary hypertension (HCC)-resolved as of 8/26/2024  Assessment & Plan     Respiratory status is stable  Echo on 8/23 shows no concern for pulmonary arterial hypertension        Medical Problems         Resolved Problems  Date Reviewed: 8/25/2024   None         Discharging Physician / Practitioner: Rosana Pat MD  PCP: Rosario Shanks DO  Admission Date:   Admission Orders (From admission, onward)          Ordered         08/23/24 1815   INPATIENT ADMISSION  Once             08/22/24 1728   Place in Observation  Once                               Discharge Date: 08/26/24     Consultations During Hospital Stay:  Neurology, PT/OT, speech      Procedures Performed:   none     Significant Findings / Test Results:   CT head wo contrast  Result Date: 8/26/2024  Impression: Evolving left paramedian pontine ischemia without secondary parenchymal hematoma. Stable chronic microvascular ischemic change and chronic lacunar infarcts described above. Resident: Gus Matthews I, the attending radiologist, have reviewed the images and agree with the final report above.      MRI brain wo contrast  Result Date: 8/22/2024  Impression: Acute left paramedian pontine small infarct. No associated hemorrhage. Mild chronic microvascular ischemic change and additional chronic lacunar infarcts as described above. The study was marked in EPIC for immediate  notification.      CTA stroke alert (head/neck)  Result Date: 8/22/2024  Impression: CTA head: -Negative for large vessel intracranial occlusion . -Areas of stenosis involving the bilateral LAURA A2 segments, left MCA bifurcation, right V4 segment, and proximal right PCA P2 segment. CTA neck: -No extracranial carotid stenosis. -Slightly lobulated contour of the extracranial ICA which can be seen with fibromuscular dysplasia (FMD). -The cervical vertebral arteries are patent. Subcentimeter calcified left thyroid nodules. Subcentimeter lateral right upper lobe lung nodule. Based on current Fleischner Society 2017 Guidelines on incidental pulmonary nodule, no routine follow-up is needed if the patient is low risk. If the patient is high risk, optional follow-up chest CT at  12 months can be considered. Findings were directly discussed with Dr. Lane Weiner at 5:05 p.m.      CT stroke alert brain  Result Date: 8/22/2024  Impression: No acute intracranial abnormality. Chronic microangiopathic ischemic changes. Right parietotemporal craniotomy.        Incidental Findings:   none      Test Results Pending at Discharge (will require follow up):   none     Outpatient Tests Requested:  none     Complications:  none     Reason for Admission: Right-sided facial droop and right arm weakness.     Hospital Course:   Brianna Meyers is a 84 y.o. female patient who originally presented to the hospital on 8/22/2024 due to right-sided facial droop, right arm weakness, slurred speech.  She was admitted as a stroke alert.  MRI brain showed left paramedian pontine thrombotic infarct.  Patient was started on aspirin and statin, repeat CT head showed evolving left paramedian pontine infarct without secondary parenchymal hematoma.  Per neurology recommendation, patient's home Eliquis was resumed.  PT OT evaluated, recommended rehab.  Patient had VBS, upper speech recommendation patient will be discharged on regular texture with nectar thick  "liquids.     Patient will need to follow-up with neurology in 4 to 6 weeks.        Please see above list of diagnoses and related plan for additional information.      Condition at Discharge: good     Discharge Day Visit / Exam:   Subjective:  no complaints   Vitals: Blood Pressure: 110/72 (08/26/24 1451)  Pulse: 83 (08/26/24 1451)  Temperature: (!) 97.1 °F (36.2 °C) (08/26/24 1451)  Temp Source: Axillary (08/26/24 0042)  Respirations: 19 (08/26/24 1451)  Height: 5' 7\" (170.2 cm) (08/23/24 1450)  Weight - Scale: 71.2 kg (157 lb) (08/23/24 1450)  SpO2: 95 % (08/26/24 1451)  Exam:   Physical Exam  Vitals and nursing note reviewed.   Constitutional:       General: She is not in acute distress.     Appearance: She is not diaphoretic.   HENT:      Head: Normocephalic.   Eyes:      General: No scleral icterus.        Right eye: No discharge.         Left eye: No discharge.   Cardiovascular:      Rate and Rhythm: Normal rate and regular rhythm.   Pulmonary:      Effort: Pulmonary effort is normal. No respiratory distress.      Breath sounds: Normal breath sounds. No wheezing, rhonchi or rales.   Abdominal:      General: There is no distension.      Palpations: Abdomen is soft.      Tenderness: There is no abdominal tenderness. There is no guarding or rebound.   Musculoskeletal:      Cervical back: Normal range of motion.      Right lower leg: No edema.      Left lower leg: No edema.   Skin:     General: Skin is warm.   Neurological:      Mental Status: She is alert.   Psychiatric:         Mood and Affect: Mood normal.         Behavior: Behavior normal.            Discussion with Family: Updated  () at bedside.     Discharge instructions/Information to patient and family:   See after visit summary for information provided to patient and family.       Provisions for Follow-Up Care:  See after visit summary for information related to follow-up care and any pertinent home health orders.       Mobility at " time of Discharge:   Basic Mobility Inpatient Raw Score: 11  -HLM Goal: 4: Move to chair/commode  JH-HLM Achieved: 6: Walk 10 steps or more  HLM Goal NOT achieved. Continue to encourage mobility in post discharge setting.     Disposition:   Acute Rehab at [facility]      Planned Readmission: no     Discharge Statement:  I spent 40 minutes discharging the patient. This time was spent on the day of discharge. I had direct contact with the patient on the day of discharge. Greater than 50% of the total time was spent examining patient, answering all patient questions, arranging and discussing plan of care with patient as well as directly providing post-discharge instructions.  Additional time then spent on discharge activities.     Discharge Medications:  See after visit summary for reconciled discharge medications provided to patient and/or family.       **Please Note: This note may have been constructed using a voice recognition system**

## 2024-11-04 PROBLEM — A41.9 SEPSIS (HCC): Status: ACTIVE | Noted: 2024-01-01

## 2024-11-04 PROBLEM — E11.65 TYPE 2 DIABETES MELLITUS WITH HYPERGLYCEMIA (HCC): Status: ACTIVE | Noted: 2024-01-01

## 2024-11-04 PROBLEM — R29.90 STROKE-LIKE SYMPTOMS: Status: ACTIVE | Noted: 2024-01-01

## 2024-11-04 PROBLEM — N17.9 AKI (ACUTE KIDNEY INJURY) (HCC): Status: ACTIVE | Noted: 2024-01-01

## 2024-11-04 PROBLEM — J18.9 PNEUMONIA: Status: ACTIVE | Noted: 2024-01-01

## 2024-11-04 PROBLEM — E87.1 HYPONATREMIA: Status: ACTIVE | Noted: 2024-01-01

## 2024-11-04 PROBLEM — I48.91 ATRIAL FIBRILLATION WITH RAPID VENTRICULAR RESPONSE (HCC): Status: ACTIVE | Noted: 2021-05-28

## 2024-11-05 PROBLEM — J96.01 ACUTE RESPIRATORY FAILURE WITH HYPOXIA (HCC): Status: ACTIVE | Noted: 2024-01-01

## 2024-11-05 NOTE — ASSESSMENT & PLAN NOTE
Possible PNA noted on CXR. No priors to compare too. Pending official read  On RA  Meeting SIRS   WBC 20.96, tachycardia, tachypnea   Lactic pending   Procal 9.13  Hx of dysphagia. Poor swallow eval in the ER. Suspect possible aspiration   NPO/IVF   Covid/Flu/RSV pending   Consult speech   IV ceftriaxone

## 2024-11-05 NOTE — UTILIZATION REVIEW
Initial Clinical Review    Admission: Date/Time/Statement:   Admission Orders (From admission, onward)       Ordered        11/04/24 2108  INPATIENT ADMISSION  Once                          Orders Placed This Encounter   Procedures    INPATIENT ADMISSION     Standing Status:   Standing     Number of Occurrences:   1     Order Specific Question:   Level of Care     Answer:   Med Surg [16]     Order Specific Question:   Estimated length of stay     Answer:   More than 2 Midnights     Order Specific Question:   Certification     Answer:   I certify that inpatient services are medically necessary for this patient for a duration of greater than two midnights. See H&P and MD Progress Notes for additional information about the patient's course of treatment.     ED Arrival Information       Expected   -    Arrival   11/4/2024 19:34    Acuity   Emergent              Means of arrival   Ambulance    Escorted by   [facility] ([place])    Service   Critical Care/ICU    Admission type   Emergency              Arrival complaint   Slurred Speech             Chief Complaint   Patient presents with    Slurred Speech     Patient reports to ED via EMS from [facility] with c/o stroke like symptoms. Per EMS, patient is slurring words and has minor facial droop. In route, BG over 500.       Initial Presentation: 84 y.o. female  to ED via EMS from nursing facility.    Admitted to inpatient with Dx:  Stroke like symptoms/atrial fib with RVR/Type 2 DM with hyperglycemia/DANNI/Sepsis/Hyponatremia.  Presented to ED with  slurred speech and weakness with unknown time frame,  possible few days or few hours. PMHx: CVA, cognitive decline, A-fib, CKD, hypertension, type 2 diabetes . On exam:  tachycardia.  Rhythm irregular.  Facial asymmetry.  Generalized weakness, no focal deficits in the upper or lower extremities bilaterally. Gait not tested as patient does not appear to be ambulatory at baseline.   INR 2 on Eliquis.   Glucose 519.   Bun 43.  Creatinine 1.84, baseline 0.7..    Procalcitonin 9.13.   Imaging shows  no acute findings on ct head,  pulmonary congestion on CxR.   ED treatment  Stroke alert.  Given IVF bolus, Cardizem and Mg   Plan includes neuro checks, telemetry.   MRI, hold home Eliquis.  Continue home asa and statin.   Consult neurology,  PT/OT, speech.   Continue home Dig.   Continue IVF,  additional dose of Humalog, SSI.   Hold lisinopril and amlodipine.     Trend BMP.  NPO, failed dysphagia screen in ED.      11/5/24 per neurology - stroke like symptoms - CTH showed no evidence of any acute intracranial abnormalities. CTA showed no evidence of LVO. NIHSS 2 for dysarthria and L facial droop. Not a candidate for TNK given unclear LKW and active eliquis use.  Recommend stroke work up.   Permissive hypertension.   Hold Eliquis.  Continue asa and statin.       Anticipated Length of Stay/Certification Statement: Patient will be admitted on an inpatient basis with an anticipated length of stay of greater than 2 midnights secondary to stroke like symptoms, sepsis, PNA.     Date: 11/5/24   Day 2: Overnight worsening of mental status and breathing pattern.  Worsening atrial fib.    To critical care, concern for pneumonia, large stroke.   Initially placed on Bipap, transitioned to HFNC   Today:  respiratory distress.   On exam diminished breath sounds due to poor effort.   + BLE edema.   Wbc 16.53.   bun 46. Creatinine 1.93.   lactic acid 9.3.  hold on ct head due to respiratory status.   Continue neuro checks.  Oxygen.   Antibiotics.  Follow cultures.  Telemetry - given amnio bolus and drip.   Sepsis fluids, 30cc/kg.  SSI.      Per Critical Care 11/5/24:  Shock-undifferentiated/Lactic acidosis/Anion gap metabolic acidosis/Suspected CVA/Atrial fibrillation/Acute hypoxic respiratory failure.  Continue Vasopressors for MAP > 65.   High flow nasal cannula for sat > 92%.   NPO.   Monitor renal function.  Glycemic control.  Goals of care discussion,  family arriving today.      Patient has crossed 3 midnights and requires ongoing care    11/6/2024 .  Patient presents with end of life care.    On exam appears comfortable.   Rhythm irregular.   Lungs rhonchi.   Minimal response to noxious stimuli  Abnormal labs or imaging:  comfort care.  No labs  Diagnosis/Plan    Stroke like symptoms/atrial fib with RVR/Type 2 DM with hyperglycemia/DANNI/Sepsis/End of life care.   Spouse has opted for comfort care.  Using IV Dilaudid.   Consult Hospice.       ED Treatment-Medication Administration from 11/04/2024 1934 to 11/04/2024 2146         Date/Time Order Dose Route Action     11/04/2024 2112 diltiazem (CARDIZEM) injection 15 mg 15 mg Intravenous Given     11/04/2024 2114 sodium chloride 0.9 % bolus 500 mL 500 mL Intravenous New Bag     11/04/2024 2117 magnesium sulfate IVPB (premix) SOLN 1 g 1 g Intravenous New Bag            Scheduled Medications prior to comfort care status:  Medications:  Scheduled PRN   aspirin, 81 mg, Daily  atorvastatin, 40 mg, QPM  cefepime, 2,000 mg, Q24H  digoxin, 187.5 mcg, Daily  insulin lispro, 1-5 Units, Q6H MIGUEL  vancomycin, 25 mg/kg, Once       acetaminophen, 1,000 mg, IV Q6H PRN x 2 11/5  [START ON 11/6/2024] vancomycin, 12.5 mg/kg (Adjusted), Once PRN         Continuous      Infusions Meds   amiodarone (CORDARONE) 900 mg in dextrose 5 % 500 mL infusion, 1 mg/min, Last Rate: 1 mg/min (11/05/24 0341)   Followed by  amiodarone (CORDARONE) 900 mg in dextrose 5 % 500 mL infusion, 0.5 mg/min  multi-electrolyte, 75 mL/hr                   Transition to comfort care on 11/6/24:  Meds:  haloperidol lactate, 0.5 mg, Intravenous, Q2H PRN x 2 11/5  HYDROmorphone, 0.3 mg, Intravenous, Q1H PRN x 1 11/5.  X 3 11/6  LORazepam, 1 mg, Intravenous, Q10 Min PRN x 4 11/5    HYDROmorphone (DILAUDID) injection 0.5 mg x 1 11/5  Dose: 0.5 mg  Freq: Once as needed Route: IV  PRN Reasons: other,severe pain  PRN Comment: air hunger  Start: 11/05/24 0707 End: 11/05/24  0915      ED Triage Vitals   Temperature Pulse Respirations Blood Pressure SpO2 Pain Score   11/04/24 2024 11/04/24 1944 11/04/24 1944 11/04/24 1944 11/04/24 1944 11/05/24 0915   97.9 °F (36.6 °C) (!) 115 20 120/67 90 % Med Not Given for Pain - for MAR use only     Weight (last 2 days)       Date/Time Weight    11/05/24 0250 67.3 (148.37)    11/04/24 1943 51.4 (113.32)     Weight: Bedscale at 11/04/24 1943            Vital Signs (last 3 days)       Date/Time Temp Pulse Resp BP MAP (mmHg) SpO2 FiO2 (%) Calculated FIO2 (%) - Nasal Cannula O2 Flow Rate (L/min) Nasal Cannula O2 Flow Rate (L/min) O2 Device O2 Interface Device Patient Position - Orthostatic VS [place] Coma Scale Score Pain    11/06/24 1109 -- -- -- -- -- -- -- -- -- -- -- -- -- -- Med Not Given for Pain - for MAR use only    11/06/24 0257 -- -- -- -- -- -- -- -- -- -- -- -- -- -- Med Not Given for Pain - for MAR use only    11/06/24 0209 -- 123 -- -- -- 89 % -- -- -- -- -- -- -- -- --    11/06/24 0208 -- 118 -- -- -- 89 % -- -- -- -- -- -- -- -- --    11/06/24 0207 -- 132 -- -- -- 89 % -- -- -- -- -- -- -- -- --    11/06/24 0206 -- 114 -- -- -- 90 % -- -- -- -- -- -- -- -- --    11/06/24 0205 -- 118 -- -- -- 90 % -- -- -- -- -- -- -- -- --    11/06/24 0031 -- -- -- -- -- -- -- -- -- -- -- -- -- -- Med Not Given for Pain - for MAR use only    11/05/24 2028 -- -- -- -- -- 82 % -- -- -- -- None (Room air) -- -- 4 --    11/05/24 2027 -- 112 36 -- -- -- -- -- -- -- -- -- -- -- --    11/05/24 2026 -- -- -- -- -- -- -- -- -- -- -- -- -- -- Med Not Given for Pain - for MAR use only    11/05/24 0915 -- -- -- -- -- -- -- -- -- -- -- -- -- -- Med Not Given for Pain - for MAR use only    11/05/24 0900 -- 111 32 106/55 69 96 % -- -- -- -- High flow nasal cannula -- Lying -- --    11/05/24 0850 -- 100 39 96/44 63 97 % -- -- -- -- High flow nasal cannula -- Lying -- --    11/05/24 0840 -- 108 28 92/57 70 96 % -- -- -- -- High flow nasal cannula -- Lying -- --     11/05/24 0830 -- 107 33 84/56 64 96 % -- -- -- -- High flow nasal cannula -- Lying -- --    11/05/24 0820 -- 104 32 79/52 60 95 % -- -- -- -- High flow nasal cannula -- Lying -- --    11/05/24 0810 -- 111 31 82/52 62 95 % -- -- -- -- High flow nasal cannula -- Lying -- --    11/05/24 0800 -- 107 30 79/51 61 96 % -- -- -- -- High flow nasal cannula -- Lying -- --    11/05/24 0750 -- 112 31 82/49 57 95 % -- -- -- -- High flow nasal cannula -- Lying -- --    11/05/24 0740 -- 101 25 82/53 63 95 % -- -- -- -- High flow nasal cannula -- Lying -- --    11/05/24 0730 -- 106 28 90/50 62 96 % -- -- -- -- High flow nasal cannula -- Lying -- --    11/05/24 0720 -- 108 28 77/52 59 96 % -- -- -- -- High flow nasal cannula -- Lying -- --    11/05/24 0715 -- -- -- -- -- -- -- -- -- -- -- -- -- 4 --    11/05/24 0710 -- 111 27 94/51 68 96 % 40 -- -- -- High flow nasal cannula -- Lying -- --    11/05/24 0650 -- 109 28 95/60 69 97 % -- -- -- -- -- -- -- -- --    11/05/24 0640 -- 100 23 65/48 53 96 % -- -- -- -- -- -- -- -- --    11/05/24 0638 -- 102 24 66/46 52 94 % -- -- -- -- -- -- -- -- --    11/05/24 0627 -- 107 22 72/48 55 95 % -- -- -- -- -- -- -- -- --    11/05/24 0624 -- 110 -- 64/48 53 -- -- -- -- -- -- -- -- -- --    11/05/24 0600 -- 122 28 79/51 57 95 % -- -- -- -- -- -- -- -- --    11/05/24 0545 -- 114 27 84/53 64 96 % -- -- -- -- -- -- -- -- --    11/05/24 0530 -- 125 28 113/50 69 96 % -- -- -- -- -- -- -- 3 --    11/05/24 0527 -- 127 26 131/63 90 95 % -- -- -- -- -- -- -- -- --    11/05/24 0526 100 °F (37.8 °C) -- -- -- -- -- -- -- -- -- -- -- -- -- --    11/05/24 0520 -- 124 30 141/64 89 95 % -- -- -- -- -- -- -- -- --    11/05/24 0515 -- 123 30 64/48 53 98 % -- -- -- -- -- -- -- -- --    11/05/24 0510 -- 115 29 65/43 49 99 % -- -- -- -- -- -- -- -- --    11/05/24 0505 -- 121 31 60/38 44 96 % -- -- -- -- -- -- -- -- --    11/05/24 0501 -- -- -- -- -- -- 40 -- 60 L/min -- High flow nasal cannula HFNC prongs -- -- --     11/05/24 0500 -- 120 39 74/43 51 99 % -- -- -- -- -- -- -- -- --    11/05/24 0430 -- 134 54 111/84 93 98 % -- -- -- -- -- -- -- -- --    11/05/24 0415 -- 131 30 118/59 76 98 % -- -- -- -- -- -- -- -- --    11/05/24 0400 -- 139 35 93/50 68 100 % -- -- -- -- High flow nasal cannula -- Lying -- --    11/05/24 0345 -- 113 32 107/52 75 98 % -- -- -- -- -- -- -- -- --    11/05/24 0330 -- 120 23 116/56 78 98 % -- -- -- -- -- -- -- -- --    11/05/24 0315 -- 127 26 111/63 78 96 % -- -- -- -- -- -- -- -- --    11/05/24 0300 102.8 °F (39.3 °C) 139 27 84/44 60 93 % -- -- -- -- -- -- -- -- --    11/05/24 0258 -- -- -- -- -- -- -- -- -- -- -- -- -- 3 --    11/05/24 0250 -- 143 36 119/83 95 94 % -- -- -- -- High flow nasal cannula -- Lying -- --    11/05/24 0246 -- 153 40 119/83 95 -- -- -- -- -- -- -- -- -- --    11/05/24 0245 -- -- -- -- -- -- 60 -- 60 L/min -- High flow nasal cannula HFNC prongs -- -- --    11/05/24 0228 -- -- -- -- -- 99 % -- -- -- -- -- Face mask -- -- --    11/05/24 0209 -- 114 -- 85/51 62 -- -- -- -- -- -- -- -- -- --    11/05/24 0200 -- 114 -- 83/51 62 -- -- -- -- -- -- -- -- 3 --    11/05/24 0100 -- -- -- -- -- -- -- -- -- -- -- -- -- 11 --    11/05/24 0000 -- -- -- -- -- -- -- -- -- -- -- -- -- 11 --    11/04/24 2348 -- 125 22 167/106 126 -- -- -- -- -- -- -- Lying -- --    11/04/24 2306 -- -- -- 130/73 92 -- -- -- -- -- -- -- -- -- --    11/04/24 2300 -- 118 22 130/75 -- -- -- -- -- -- -- -- Lying 11 --    11/04/24 2215 -- -- -- -- -- 90 % -- 28 -- 2 L/min Nasal cannula -- -- 11 --    11/04/24 22:01:14 98.8 °F (37.1 °C) 84 18 134/72 93 89 % -- -- -- -- -- -- -- -- --    11/04/24 2115 -- 93 20 106/58 76 92 % -- -- -- -- -- -- -- -- --    11/04/24 2100 -- 107 22 109/67 -- 93 % -- -- -- -- -- -- -- 14 --    11/04/24 2045 -- 107 22 103/55 -- 96 % -- -- -- -- Nasal cannula -- -- 14 --    11/04/24 2030 -- 109 22 99/55 -- 95 % -- -- -- -- Nasal cannula -- -- 14 --    11/04/24 2025 -- 111 20 123/67 -- 95 % --  -- -- -- -- -- -- 14 --    11/04/24 2024 97.9 °F (36.6 °C) -- -- -- -- -- -- -- -- -- -- -- -- -- --    11/04/24 2000 -- 119 20 118/67 -- 94 % -- -- -- -- Nasal cannula -- -- 14 --    11/04/24 19:44:22 -- 115 20 120/67 -- 90 % -- -- -- -- None (Room air) -- -- 14 --              Pertinent Labs/Diagnostic Test Results:   Radiology:  XR chest portable   Final Interpretation by Raciel Maldonado MD (11/05 0800)      No active pulmonary disease.            Workstation performed: [id number]         XR chest 1 view portable   Final Interpretation by Raciel Maldonado MD (11/05 0758)      Rotated exam. Mild pulmonary vascular congestion suggested.            Workstation performed: [id number]         CT stroke alert brain   Final Interpretation by Ermias Fishman MD (11/04 2033)      No acute intracranial abnormality.         I personally discussed this study with HONG RICHARDSON on 11/4/2024 813 PM.            Workstation performed: [id number]         CTA stroke alert (head/neck)   Final Interpretation by Ermias Fishman MD (11/04 2033)      No large vessel occlusion in the head or neck.      Unchanged atherosclerotic irregularity as described, with focal moderate to severe stenosis in the intradural right vertebral artery, both A2 segments of the anterior cerebral arteries, left MCA bifurcation, and P2 segment right posterior cerebral    artery.      Mildly beaded contouring of the cervical ICAs which could be seen with fibromuscular dysplasia.      Aneurysmal dilatation of the proximal ascending aorta measuring 4.2 cm in diameter.      Enlarged main pulmonary artery indicating some degree of pulmonary hypertension.         I personally discussed this study with HONG RICHARDSON on 11/4/2024 8:32 PM.            Workstation performed: [id number]           Cardiology:  ECG 12 lead   Final Result by Ca Oconnell MD (11/06 1039)   Atrial fibrillation with rapid ventricular response   Rightward axis   ST & T wave  abnormality, consider inferior ischemia   Abnormal ECG   When compared with ECG of 04-Nov-2024 19:51, (unconfirmed)   Nonspecific T wave abnormality, worse in Anterior leads   Confirmed by Ca Oconnell ([id number]) on 11/6/2024 10:39:36 AM      ECG 12 lead   Final Result by Ca Oconnell MD (11/06 1039)   Atrial fibrillation with rapid ventricular response   Rightward axis   ST & T wave abnormality, consider inferolateral ischemia   Abnormal ECG   When compared with ECG of 22-Aug-2024 16:32,   Nonspecific T wave abnormality now evident in Inferior leads   Inverted T waves have replaced nonspecific T wave abnormality in Lateral    leads   Confirmed by Ca Oconnell ([id number]) on 11/6/2024 10:39:28 AM        Results from last 7 days   Lab Units 11/04/24 2332   SARS-COV-2  Negative     Results from last 7 days   Lab Units 11/05/24 0451 11/05/24 0223 11/05/24 0142 11/04/24 1947 11/04/24 1946   WBC Thousand/uL 16.53*  --  3.11* 20.96*  --    HEMOGLOBIN g/dL 12.8  --  14.2 13.8  --    I STAT HEMOGLOBIN g/dl  --  12.2  --   --  13.6   HEMATOCRIT % 39.3  --  45.2 40.2  --    HEMATOCRIT, ISTAT %  --  36  --   --  40   PLATELETS Thousands/uL 161  --  181 218  --      Results from last 7 days   Lab Units 11/05/24 0451 11/05/24 0223 11/05/24 0142 11/04/24 1947 11/04/24 1946   SODIUM mmol/L 137  --  135 130*  --    POTASSIUM mmol/L 3.6  --  4.5 4.8  --    CHLORIDE mmol/L 99  --  97 95*  --    CO2 mmol/L 18*  --  20* 23  --    CO2, I-STAT mmol/L  --  13*  --   --  23   ANION GAP mmol/L 20*  --  18* 12  --    BUN mg/dL 46*  --  45* 43*  --    CREATININE mg/dL 1.93*  --  1.88* 1.84*  --    EGFR ml/min/1.73sq m 23 -- 24 25  --    CALCIUM mg/dL 9.1  --  10.0 10.0  --    CALCIUM, IONIZED, ISTAT mmol/L  --  1.30  --   --  1.25   MAGNESIUM mg/dL 2.0  --  2.0 1.7*  --    PHOSPHORUS mg/dL 2.0*  --   --   --   --      Results from last 7 days   Lab Units 11/05/24  0558 11/05/24  0325 11/05/24  0204 11/05/24  0008  11/04/24 1938   POC GLUCOSE mg/dl 250* 322* 380* 447* >600*     Results from last 7 days   Lab Units 11/05/24 0451 11/05/24 0142 11/04/24 1947   GLUCOSE RANDOM mg/dL 295* 387* 519*     Results from last 7 days   Lab Units 11/05/24  0025   OSMOLALITY, SERUM mmol/*     Results from last 7 days   Lab Units 11/04/24 1947   HEMOGLOBIN A1C % 8.1*   EAG mg/dl 186     Beta- Hydroxybutyrate   Date Value Ref Range Status   11/04/2024 0.16 0.02 - 0.27 mmol/L Final      Results from last 7 days   Lab Units 11/05/24 0223 11/04/24 1946   PH, MARBELLA I-STAT   --  7.485*   PCO2, MARBELLA ISTAT mm HG  --  29.7*   PO2, MARBELLA ISTAT mm HG  --  39.0   HCO3, MARBELLA ISTAT mmol/L  --  22.3*   I STAT BASE EXC mmol/L -10* 0   I STAT O2 SAT % 97* 79   ISTAT PH ART  7.410  --    I STAT ART PCO2 mm HG 20.2*  --    I STAT ART PO2 mm HG 84.0  --    I STAT ART HCO3 mmol/L 12.8*  --      Results from last 7 days   Lab Units 11/05/24 0451 11/05/24 0025 11/04/24 1947   HS TNI 0HR ng/L  --   --  13   HS TNI 2HR ng/L  --  14  --    HSTNI D2 ng/L  --  1  --    HS TNI 4HR ng/L 38  --   --    HSTNI D4 ng/L 25*  --   --      Results from last 7 days   Lab Units 11/04/24 1947   PROTIME seconds 23.1*   INR  2.00*   PTT seconds 39*     Results from last 7 days   Lab Units 11/04/24 1947   TSH 3RD GENERATON uIU/mL 3.029     Results from last 7 days   Lab Units 11/05/24 0142 11/04/24 1947   PROCALCITONIN ng/ml 11.69* 9.13*     Results from last 7 days   Lab Units 11/05/24 0451 11/05/24 0142   LACTIC ACID mmol/L 9.3* 7.9*     Results from last 7 days   Lab Units 11/05/24  0451   DIGOXIN LVL ng/mL 1.0     Results from last 7 days   Lab Units 11/05/24  0309 11/05/24  0025   OSMOLALITY, SERUM mmol/KG  --  322*   OSMO UR mmol/  --      Results from last 7 days   Lab Units 11/05/24  0312 11/05/24  0309   CLARITY UA  Extra Turbid  --    COLOR UA  Yellow  --    SPEC GRAV UA  1.025  --    PH UA  5.5  --    GLUCOSE UA mg/dl 1000 (1%)*  --    KETONES UA mg/dl  Trace*  --    BLOOD UA  Large*  --    PROTEIN UA mg/dl 30 (1+)*  --    NITRITE UA  Negative  --    BILIRUBIN UA  Negative  --    UROBILINOGEN UA (BE) mg/dl <2.0  --    LEUKOCYTES UA  Large*  --    WBC UA /hpf Field obscured, unable to enumerate*  --    RBC UA /hpf Field obscured, unable to enumerate*  --    BACTERIA UA /hpf Field obscured, unable to enumerate*  --    EPITHELIAL CELLS WET PREP /hpf Field obscured, unable to enumerate*  --    SODIUM UR   --  25     Results from last 7 days   Lab Units 11/05/24  0309 11/04/24  2332   STREP PNEUMONIAE ANTIGEN, URINE  Negative  --    LEGIONELLA URINARY ANTIGEN  Negative  --    INFLUENZA A PCR   --  Negative   INFLUENZA B PCR   --  Negative   RSV PCR   --  Negative     Results from last 7 days   Lab Units 11/05/24  0312 11/05/24  0142   BLOOD CULTURE   --  No Growth at 24 hrs.  No Growth at 24 hrs.   URINE CULTURE  >100,000 cfu/ml Escherichia coli*  >100,000 cfu/ml Gamma Hemolytic Streptococcus NOT Enterococcus  <10,000 cfu/ml Proteus species*  --      No past medical history on file.  Present on Admission:   Type 2 diabetes mellitus with hyperglycemia (HCC)   Atrial fibrillation with rapid ventricular response (HCC)   Hypotension   DNANI (acute kidney injury) (HCC)   Stroke-like symptoms   Sepsis (HCC)   Hyponatremia      Admitting Diagnosis: Slurred speech [R47.81]  Acute kidney injury (HCC) [N17.9]  Stroke-like symptoms [R29.90]  Atrial fibrillation, unspecified type (HCC) [I48.91]  Uncontrolled diabetes mellitus with hyperglycemia (HCC) [E11.65]  Age/Sex: 84 y.o. female    Network Utilization Review Department  ATTENTION: Please call with any questions or concerns to [phone] and carefully listen to the prompts so that you are directed to the right person. All voicemails are confidential.   For Discharge needs, contact Care Management DC Support Team at [phone] opt. [phone]  Send all requests for admission clinical reviews, approved or denied determinations and  any other requests to dedicated fax number below belonging to the campus where the patient is receiving treatment. List of dedicated fax numbers for the Facilities:  FACILITY NAME UR FAX NUMBER   ADMISSION DENIALS (Administrative/Medical Necessity) [phone]   DISCHARGE SUPPORT TEAM (NETWORK) [phone]   PARENT CHILD HEALTH (Maternity/NICU/Pediatrics) [phone]   [facility] [phone]   [facility] [phone]   [facility] [phone]   [facility] [phone]   [facility] [phone]   [facility] [phone]   [facility] [phone]   [facility] [phone]   [facility] [phone]   Critical access hospital [phone]   [facility] [phone]   [facility] [phone]

## 2024-11-05 NOTE — SEPSIS NOTE
"  Sepsis Note   Brianna Meyers 84 y.o. female MRN: [id number]  Unit/Bed#: -01 SDU Encounter: [id number]       Initial Sepsis Screening       Row Name 11/05/24 0425                Is the patient's history suggestive of a new or worsening infection? Yes (Proceed)  -        Suspected source of infection pneumonia  -        Indicate SIRS criteria Tachypnea > 20 resp per min;Leukocytosis (WBC > 50763 IJL) OR Leukopenia (WBC <4000 IJL) OR Bandemia (WBC >10% bands);Hyperthemia > 38.3C (100.9F) OR Hypothermia <36C (96.8F);Tachycardia > 90 bpm  -        Are two or more of the above signs & symptoms of infection both present and new to the patient? Yes (Proceed)  -        Assess for evidence of organ dysfunction: Are any of the below criteria present within 6 hours of suspected infection and SIRS criteria that are NOT considered to be chronic conditions? Lactate >/equal 4.0  -        Date of presentation of septic shock 11/05/24  -        Time of presentation of septic shock 0300  -        Fluid Resuscitation: 30 ml/kg IV fluid bolus will be given based on actual body weight  -        Is the patient is persistently hypotensive in the hour after fluid bolus administration? If yes, patient meets criteria for vasopressor use. NO  -        Sepsis Note: Click \"NEXT\" below (NOT \"close\") to generate sepsis note based on above information. YES (proceed by clicking \"NEXT\")  -                  User Key  (r) = Recorded By, (t) = Taken By, (c) = Cosigned By      Initials Name Provider Type     JAZMINE Alexander Nurse Practitioner                        Body mass index is 23.24 kg/m².  Wt Readings from Last 1 Encounters:   11/05/24 67.3 kg (148 lb 5.9 oz)     IBW (Ideal Body Weight): 61.6 kg    Ideal body weight: 61.6 kg (135 lb 12.9 oz)  Adjusted ideal body weight: 63.9 kg (140 lb 13.3 oz)    "

## 2024-11-05 NOTE — ASSESSMENT & PLAN NOTE
Lab Results   Component Value Date    HGBA1C 6.8 (H) 08/23/2024       Recent Labs     11/04/24  1938 11/05/24  0008   POCGLU >600* 447*       Blood Sugar Average: Last 72 hrs:  (P) 223.5  Initial    Home regimen: None   Plan   Obtain A1c  Order 10 units of humalog  IV fluids   SSI   Consider initiation of lantus however A1c in August had been 6.8.

## 2024-11-05 NOTE — PROGRESS NOTES
Brianna Meyers is a 84 y.o. female who is currently ordered Vancomycin IV with management by the Pharmacy Consult service.  Relevant clinical data and objective / subjective history reviewed.  Vancomycin Assessment:  Indication and Goal AUC/Trough: Pneumonia (goal -600, trough >10)  Clinical Status:  Initial dosing  Micro:     Renal Function:  SCr: 1.88 mg/dL  CrCl: 22.4 mL/min  Renal replacement: Not on dialysis  Days of Therapy: 1  Current Dose: 1750mg loading dose  Vancomycin Plan:  New Dosing: pulse dosing, 10-15mg/kg TBW when random <15  Next Level: 0600 on 11/6  Renal Function Monitoring: Daily BMP and UOP  Pharmacy will continue to follow closely for s/sx of nephrotoxicity, infusion reactions and appropriateness of therapy.  BMP and CBC will be ordered per protocol. We will continue to follow the patient’s culture results and clinical progress daily.    Jorge Galvan, Pharmacist

## 2024-11-05 NOTE — QUICK NOTE
Unable to reach patient's family contacts at time of admission. DEANNA left. 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 Level 1 Full Code at time of admission. Patient not oriented and unable to make decision about code status. Order placed for level 1 full code at time of admission.     At 0200 alerted by nursing that patient's mental status and breathing pattern had worsened. Patient now unable to answer any questioning. Belly breathing. Unable to follow commands. Weak on all 4 extremities. Alerted critical care. Order placed for CT head. Attempted to call family once again. DEANNA left. After multiple attempts daughter in law returned call. Informed her of patient's worsening condition. DIL ended conversation to talk to her  (patient's son) and attempt to call the patient's . Patient's  unable to be reached. DAI and patient's son Naeem JACOBO are in agreement with making patient a level 3 DNR/DNI.     Transitioned to CC service.

## 2024-11-05 NOTE — QUICK NOTE
Despite fluid resuscitation, patient's repeat lactic acid resulted 9.3.  Concern for PNA and urosepsis.  On call ICU attending made aware and no further orders at this time.  Patient is a level 3 DNR/DNI per phone call with  by myself earlier this am as well as a phone call with SLIM and the patients son earlier this am.  Will continue to call patients  again to let him know her worsening status.      0554 - patients  called back - he is going to get dressed and come see his wife, confirmed on the phone still DNR/DNI

## 2024-11-05 NOTE — ASSESSMENT & PLAN NOTE
As evidenced by leukocytosis, tachycardia, tachypnea, fever and altered mental status  Initial procalcitonin 9.13 -continue to trend  Initial lactic acid 7.9 -continue with fluid resuscitation as per protocol  COVID/flu/RSV negative  Chest x-ray concerning for multifocal pneumonia  Initially admitted to [facility] however became more lethargic, with altered mental status, fever, hypoxia  Transitioned to ICU initially on BiPAP and then high flow nasal cannula for increased work of breathing  Blood cultures pending  Broaden antibiotics to cefepime/Vanco  Urine antigens pending

## 2024-11-05 NOTE — QUICK NOTE
Patient continues to be encephalopathic with persistent hypotension and tachypnea despite HFNC and fluid resuscitation.  Attempted to update  and daughter in law via telephone but no answer despite talking to them about 2 hours ago and letting them know we would call if she gets worse.  Will continue to monitor patient and attempt to get ahold of family again.

## 2024-11-05 NOTE — NURSING NOTE
Patient upgraded to  given increasing neurological deficits, elevated heart rate and other issues.  Report verbally given to receiving RN - all questions answered.

## 2024-11-05 NOTE — ASSESSMENT & PLAN NOTE
Presented to the ER on 11/4 and found to be in A-fib with heart rates in the 110s to 130s  Received Cardizem 15 mg IV in the ER with improvement  Continue digoxin daily  Eliquis on hold per neurology due to strokelike symptoms  Continues to be in A-fib with elevated heart rates still -will give amnio bolus/drip at this time

## 2024-11-05 NOTE — CASE MANAGEMENT
Case Management Progress Note    Patient name Brianna Meyers  Location  SDU/-01 S* MRN [id number]  : 1940 Date 2024       LOS (days): 1  Geometric Mean LOS (GMLOS) (days):   Days to GMLOS:        OBJECTIVE:        Current admission status: Inpatient  Preferred Pharmacy:   UNKNOWN - FOLLOW UP PRIOR TO DISCHARGE TO E-PRESCRIBE  No address on file      Primary Care Provider: Rosario Shanks DO    Primary Insurance: [employer]  Secondary Insurance: [facility] [place]    PROGRESS NOTE:    Acknowledge Pt is on comfort care and not stable for transport.

## 2024-11-05 NOTE — QUICK NOTE
Patient started on levophed around 6 AM and has had rapidly escalating vasopressor requirements. Her breathing is labored and the patient appears to be imminently dying. I called patient's spouse to convey the concern of the patient rapidly decompensating and the high likelihood of her passing away. Patient's spouse confirms DNR/DNI status and that he is 10 miles away from the hospital. I told him that we will need to have urgent goals of care conversations when he arrives to the hospital.

## 2024-11-05 NOTE — ED NOTES
Primary RN sent report via SecureChat to Marina RAY on MS 3.        Lina Spring RN  11/04/24 2122

## 2024-11-05 NOTE — ASSESSMENT & PLAN NOTE
Lab Results   Component Value Date    HGBA1C 6.8 (H) 08/23/2024       Recent Labs     11/04/24  1938 11/05/24  0008 11/05/24  0204   POCGLU >600* 447* 380*       Blood Sugar Average: Last 72 hrs:  (P) 275.8523934961067014    Blood sugar 519 on admission  Does not take insulin at home  Hemoglobin A1c pending

## 2024-11-05 NOTE — OCCUPATIONAL THERAPY NOTE
Occupational Therapy Screen        Patient Name: Brianna Meyers  Today's Date: 11/5/2024 11/05/24 0810   OT Last Visit   OT Visit Date 11/05/24   Note Type   Note type Screen   Additional Comments OT orders recieved and chart review completed. Per chart pt now comfort care. No acute OT needs at this time. Will d/c OT orders.     Meg Martinez, OTR/L

## 2024-11-05 NOTE — ASSESSMENT & PLAN NOTE
Home regimen:   Lisinopril 40 mg daily  Hold due to DANNI  Amlodipine 5 mg daily  Hold under stroke pathway

## 2024-11-05 NOTE — ASSESSMENT & PLAN NOTE
Lab Results   Component Value Date    HGBA1C 6.8 (H) 08/23/2024       Recent Labs     11/04/24  1938 11/05/24  0008 11/05/24  0204 11/05/24  0325   POCGLU >600* 447* 380* 322*       Blood Sugar Average: Last 72 hrs:  (P) 287.25    Blood sugar 519 on presentation  Repeat hemoglobin A1c  Continue sliding scale coverage  Low threshold to initiate insulin infusion

## 2024-11-05 NOTE — ASSESSMENT & PLAN NOTE
Noted to be in afib with HR into the 110s while in the ER. Received dose of IV cardizem 15 mg with improvement  Electrolytes  K+ - 4.8   Check magnesium level   Home regimen   Digoxin daily   Eliquis   Hold (requested by neuro under stroke pathway)

## 2024-11-05 NOTE — ASSESSMENT & PLAN NOTE
Presented to [facility] from [facility] on 11/4/2024 due to slurred speech and left-sided facial droop  Of note, history of left pontine stroke with recent hospitalization 8/22 to 8/26  Initial CT head/CTA head and neck negative  Admitting team spoke with on-call neurology who recommended holding Eliquis to MRI, but continuing aspirin and Lipitor  Patient failed initial swallow eval therefore aspirin/Lipitor were held  Continue with stroke pathway  Early morning on 11/5, patient became more altered only responding to significant painful stimuli as well as right sided weakness  Held off on repeat CT imaging due to increased work of breathing/uncontrolled A-fib  Formal neurology consult pending  Family updated and made patient DNR/DNI

## 2024-11-05 NOTE — PLAN OF CARE
Problem: PAIN - ADULT  Goal: Verbalizes/displays adequate comfort level or baseline comfort level  Description: Interventions:  - Encourage patient to monitor pain and request assistance  - Assess pain using appropriate pain scale  - Administer analgesics based on type and severity of pain and evaluate response  - Implement non-pharmacological measures as appropriate and evaluate response  - Consider cultural and social influences on pain and pain management  - Notify physician/advanced practitioner if interventions unsuccessful or patient reports new pain  Outcome: Progressing     Problem: INFECTION - ADULT  Goal: Absence or prevention of progression during hospitalization  Description: INTERVENTIONS:  - Assess and monitor for signs and symptoms of infection  - Monitor lab/diagnostic results  - Monitor all insertion sites, i.e. indwelling lines, tubes, and drains  - Monitor endotracheal if appropriate and nasal secretions for changes in amount and color  - Bend appropriate cooling/warming therapies per order  - Administer medications as ordered  - Instruct and encourage patient and family to use good hand hygiene technique  - Identify and instruct in appropriate isolation precautions for identified infection/condition  Outcome: Progressing  Goal: Absence of fever/infection during neutropenic period  Description: INTERVENTIONS:  - Monitor WBC    Outcome: Progressing     Problem: SAFETY ADULT  Goal: Patient will remain free of falls  Description: INTERVENTIONS:  - Educate patient/family on patient safety including physical limitations  - Instruct patient to call for assistance with activity   - Consult OT/PT to assist with strengthening/mobility   - Keep Call bell within reach  - Keep bed low and locked with side rails adjusted as appropriate  - Keep care items and personal belongings within reach  - Initiate and maintain comfort rounds  - Make Fall Risk Sign visible to staff  - Offer Toileting every  Hours,  in advance of need  - Initiate/Maintain alarm  - Obtain necessary fall risk management equipment:   - Apply yellow socks and bracelet for high fall risk patients  - Consider moving patient to room near nurses station  Outcome: Progressing  Goal: Maintain or return to baseline ADL function  Description: INTERVENTIONS:  -  Assess patient's ability to carry out ADLs; assess patient's baseline for ADL function and identify physical deficits which impact ability to perform ADLs (bathing, care of mouth/teeth, toileting, grooming, dressing, etc.)  - Assess/evaluate cause of self-care deficits   - Assess range of motion  - Assess patient's mobility; develop plan if impaired  - Assess patient's need for assistive devices and provide as appropriate  - Encourage maximum independence but intervene and supervise when necessary  - Involve family in performance of ADLs  - Assess for home care needs following discharge   - Consider OT consult to assist with ADL evaluation and planning for discharge  - Provide patient education as appropriate  Outcome: Progressing  Goal: Maintains/Returns to pre admission functional level  Description: INTERVENTIONS:  - Perform AM-PAC 6 Click Basic Mobility/ Daily Activity assessment daily.  - Set and communicate daily mobility goal to care team and patient/family/caregiver.   - Collaborate with rehabilitation services on mobility goals if consulted    - Out of bed for toileting  - Record patient progress and toleration of activity level   Outcome: Progressing     Problem: DISCHARGE PLANNING  Goal: Discharge to home or other facility with appropriate resources  Description: INTERVENTIONS:  - Identify barriers to discharge w/patient and caregiver  - Arrange for needed discharge resources and transportation as appropriate  - Identify discharge learning needs (meds, wound care, etc.)  - Arrange for interpretive services to assist at discharge as needed  - Refer to Case Management Department for  coordinating discharge planning if the patient needs post-hospital services based on physician/advanced practitioner order or complex needs related to functional status, cognitive ability, or social support system  Outcome: Progressing     Problem: Knowledge Deficit  Goal: Patient/family/caregiver demonstrates understanding of disease process, treatment plan, medications, and discharge instructions  Description: Complete learning assessment and assess knowledge base.  Interventions:  - Provide teaching at level of understanding  - Provide teaching via preferred learning methods  Outcome: Progressing

## 2024-11-05 NOTE — ASSESSMENT & PLAN NOTE
Home regimen includes lisinopril and amlodipine  Hold at this time in the setting of normotension/DANNI

## 2024-11-05 NOTE — ASSESSMENT & PLAN NOTE
Presents from [facility] due to slurred speech and L sided facial droop.   Hx of left pontine stroke.  Admitted 8/22 through 8/26.  Had presented to the hospital with right-sided facial droop, right arm weakness and slurred speech  CT head: No acute intracranial abnormalities  CTA head and neck   No large vessel occlusion in the head or neck.  Unchanged atherosclerotic irregularity as described, with focal moderate to severe stenosis in the intradural right vertebral artery, both A2 segments of the anterior cerebral arteries, left MCA bifurcation, and P2 segment right posterior cerebral artery.  Mildly beaded contouring of the cervical ICAs which could be seen with fibromuscular dysplasia.  Aneurysmal dilatation of the proximal ascending aorta measuring 4.2 cm in diameter.  Enlarged main pulmonary artery indicating some degree of pulmonary hypertension.  Neuro recommends   Hold eliquis till MRI   Continue home aspirin and atorvastatin   Failed dysphagia assessment. If patient does not have improvement by morning then consider NG tube   Neuro checks   Telemetry   Consult neuro, pt/ot, speech and CM

## 2024-11-05 NOTE — ASSESSMENT & PLAN NOTE
History of atrial fibrillation  Presented to the ER with heart rate in the 110s  Received Cardizem 15 mg IV x 1 with improvement  Hold home dose Eliquis per neurology in the setting of strokelike symptoms  Continue home dose digoxin  Patient continues to have heart rates 120s to 150s -given amnio bolus/drip

## 2024-11-05 NOTE — ED PROVIDER NOTES
Time reflects when diagnosis was documented in both MDM as applicable and the Disposition within this note       Time User Action Codes Description Comment    11/4/2024  7:43 PM Olayinka Anderson [R29.90] Stroke-like symptoms     11/4/2024  9:03 PM Olayinka Anderson [N17.9] Acute kidney injury (HCC)     11/4/2024  9:03 PM Olayinka Anderson [I48.91] Atrial fibrillation, unspecified type (HCC)     11/4/2024  9:05 PM Olayinka Anderson [E11.65] Uncontrolled diabetes mellitus with hyperglycemia (HCC)           ED Disposition       ED Disposition   Admit    Condition   Stable    Date/Time   Mon Nov 4, 2024  9:03 PM    Comment   Case was discussed with RODOLFO and the patient's admission status was agreed to be Admission Status: inpatient status to the service of Dr. Sewell .               Assessment & Plan       Medical Decision Making  84-year-old female with complex medical history, previous stroke arrived by EMS for concerns of strokelike symptoms.  This includes slurred speech and weakness.  Patient is a poor historian.  Patient had a stroke approximately 3 months ago which had slurred speech and right-sided deficits consistent with her exam today.  No obvious new neurologic deficits, CTA and CT were grossly normal.  Stroke alert was called, neurology reviewed patient imaging not a candidate for TNK based on already being on Eliquis.  Patient had elevated heart rate with known A-fib, controlled with diltiazem.  Per neurology will hold Eliquis, can continue aspirin.  Plan to admit for completion of stroke pathway.  Patient is agreeable with plan.  Also noted to have DANNI with dehydration.  Given IV fluids to help control blood glucose and hydration for renal injury.    CXR shows aspiration vs pna. Given WBC count and poor swallow eval in ED, will plan for antibiotics, cultures. Admitting team updated and agreeable with plan.     Amount and/or Complexity of Data Reviewed  Labs: ordered.  Radiology:  ordered.    Risk  Prescription drug management.  Decision regarding hospitalization.        ED Course as of 11/04/24 2112 Mon Nov 04, 2024 1954 Not TNK candidate   2026 PH okay, no AG, suspicion for DKA lower than purely elevated glucose.       Medications   diltiazem (CARDIZEM) injection 15 mg (has no administration in time range)   insulin lispro (HumALOG/ADMELOG) 100 units/mL subcutaneous injection 1-6 Units (has no administration in time range)   insulin lispro (HumALOG/ADMELOG) 100 units/mL subcutaneous injection 1-6 Units (has no administration in time range)   sodium chloride 0.9 % bolus 500 mL (has no administration in time range)   magnesium sulfate IVPB (premix) SOLN 1 g (has no administration in time range)   iohexol (OMNIPAQUE) 350 MG/ML injection (MULTI-DOSE) 75 mL (75 mL Intravenous Given 11/4/24 2018)       ED Risk Strat Scores                           SBIRT 20yo+      Flowsheet Row Most Recent Value   Initial Alcohol Screen: US AUDIT-C     1. How often do you have a drink containing alcohol? 0 Filed at: 11/04/2024 1943   2. How many drinks containing alcohol do you have on a typical day you are drinking?  0 Filed at: 11/04/2024 1943   3a. Male UNDER 65: How often do you have five or more drinks on one occasion? 0 Filed at: 11/04/2024 1943   3b. FEMALE Any Age, or MALE 65+: How often do you have 4 or more drinks on one occassion? 0 Filed at: 11/04/2024 1943   Audit-C Score 0 Filed at: 11/04/2024 1943   DAMIR: How many times in the past year have you...    Used an illegal drug or used a prescription medication for non-medical reasons? Never Filed at: 11/04/2024 1943                            History of Present Illness       Chief Complaint   Patient presents with    Slurred Speech     Patient [facility] with c/o stroke like symptoms. Per EMS, patient is slurring words and has minor facial droop. In route, BG over 500.       No past medical history on file.   No past  "surgical history on file.   No family history on file.   Social History     Tobacco Use    Smoking status: Never    Smokeless tobacco: Never   Vaping Use    Vaping status: Never Used   Substance Use Topics    Alcohol use: Never    Drug use: Never      E-Cigarette/Vaping    E-Cigarette Use Never User       E-Cigarette/Vaping Substances    Nicotine No     THC No     CBD No     Flavoring No     Other No     Unknown No       I have reviewed and agree with the history as documented.     Arrives via EMS for concerns of new strokelike symptoms.  Unsure of new onset of several days or a few hours prior to arrival.  Patient is maintained on Eliquis for A-fib.  Per EMS, glucometer read \"high\".  Patient poor historian.        Review of Systems   Unable to perform ROS: Acuity of condition           Objective       ED Triage Vitals   Temperature Pulse Blood Pressure Respirations SpO2 Patient Position - Orthostatic VS   11/04/24 2024 11/04/24 1944 11/04/24 1944 11/04/24 1944 11/04/24 1944 --   97.9 °F (36.6 °C) (!) 115 120/67 20 90 %       Temp Source Heart Rate Source BP Location FiO2 (%) Pain Score    11/04/24 2024 11/04/24 1944 -- -- --    Oral Monitor         Vitals      Date and Time Temp Pulse SpO2 Resp BP Pain Score FACES Pain Rating User   11/04/24 2100 -- 107 93 % 22 109/67 -- -- RD   11/04/24 2045 -- 107 96 % 22 103/55 -- -- RD   11/04/24 2030 -- 109 95 % 22 99/55 -- -- RD   11/04/24 2025 -- 111 95 % 20 123/67 -- -- RD   11/04/24 2024 97.9 °F (36.6 °C) -- -- -- -- -- -- RD   11/04/24 2000 -- 119 94 % 20 118/67 -- -- RD   11/04/24 1944 -- 115 90 % 20 120/67 -- -- RD            Physical Exam  Vitals and nursing note reviewed.   Constitutional:       General: She is not in acute distress.     Appearance: She is well-developed.   HENT:      Head: Normocephalic and atraumatic.   Eyes:      Conjunctiva/sclera: Conjunctivae normal.      Pupils: Pupils are equal, round, and reactive to light.   Neck:      Trachea: No tracheal " deviation.   Cardiovascular:      Rate and Rhythm: Tachycardia present. Rhythm irregular.   Pulmonary:      Effort: Pulmonary effort is normal. No respiratory distress.      Breath sounds: Normal breath sounds. No wheezing or rales.   Abdominal:      General: Bowel sounds are normal. There is no distension.      Palpations: Abdomen is soft.      Tenderness: There is no abdominal tenderness. There is no guarding or rebound.   Musculoskeletal:         General: No tenderness or deformity. Normal range of motion.      Cervical back: Normal range of motion and neck supple.   Skin:     General: Skin is warm and dry.      Capillary Refill: Capillary refill takes less than 2 seconds.      Findings: No rash.   Neurological:      Mental Status: She is oriented to person, place, and time.      GCS: GCS eye subscore is 4. GCS verbal subscore is 4. GCS motor subscore is 6.      Cranial Nerves: Facial asymmetry present.      Sensory: Sensation is intact.      Comments: Generalized weakness, no focal deficits in the upper or lower extremities bilaterally.  Gait not tested as patient does not appear to be ambulatory at baseline.   Psychiatric:         Behavior: Behavior normal.         Results Reviewed       Procedure Component Value Units Date/Time    UA w Reflex to Microscopic w Reflex to Culture [[id number]]     Lab Status: No result Specimen: Urine     Beta Hydroxybutyrate [[id number]]  (Normal) Collected: 11/04/24 1947    Lab Status: Final result Specimen: Blood from Arm, Left Updated: 11/04/24 2049     Beta- Hydroxybutyrate 0.16 mmol/L     Protime-INR [[id number]]  (Abnormal) Collected: 11/04/24 1947    Lab Status: Final result Specimen: Blood from Arm, Left Updated: 11/04/24 2028     Protime 23.1 seconds      INR 2.00    Narrative:      INR Therapeutic Range    Indication                                             INR Range      Atrial Fibrillation                                               2.0-3.0  Hypercoagulable State                                     2.0.2.3  Left Ventricular Asist Device                            2.0-3.0  Mechanical Heart Valve                                  -    Aortic(with afib, MI, embolism, HF, LA enlargement,    and/or coagulopathy)                                     2.0-3.0 (2.5-3.5)     Mitral                                                             2.5-3.5  Prosthetic/Bioprosthetic Heart Valve               2.0-3.0  Venous thromboembolism (VTE: VT, PE        2.0-3.0    APTT [[id number]]  (Abnormal) Collected: 11/04/24 1947    Lab Status: Final result Specimen: Blood from Arm, Left Updated: 11/04/24 2028     PTT 39 seconds     Basic metabolic panel [[id number]]  (Abnormal) Collected: 11/04/24 1947    Lab Status: Final result Specimen: Blood from Arm, Left Updated: 11/04/24 2026     Sodium 130 mmol/L      Potassium 4.8 mmol/L      Chloride 95 mmol/L      CO2 23 mmol/L      ANION GAP 12 mmol/L      BUN 43 mg/dL      Creatinine 1.84 mg/dL      Glucose 519 mg/dL      Calcium 10.0 mg/dL      eGFR 25 ml/min/1.73sq m     Narrative:      [employer] guidelines for Chronic Kidney Disease (CKD):     Stage 1 with normal or high GFR (GFR > 90 mL/min/1.73 square meters)    Stage 2 Mild CKD (GFR = 60-89 mL/min/1.73 square meters)    Stage 3A Moderate CKD (GFR = 45-59 mL/min/1.73 square meters)    Stage 3B Moderate CKD (GFR = 30-44 mL/min/1.73 square meters)    Stage 4 Severe CKD (GFR = 15-29 mL/min/1.73 square meters)    Stage 5 End Stage CKD (GFR <15 mL/min/1.73 square meters)  Note: GFR calculation is accurate only with a steady state creatinine    HS Troponin 0hr (reflex protocol) [[id number]]  (Normal) Collected: 11/04/24 1947    Lab Status: Final result Specimen: Blood from Arm, Left Updated: 11/04/24 2014     hs TnI 0hr 13 ng/L     HS Troponin I 2hr [[id number]]     Lab Status: No result Specimen: Blood     HS Troponin I 4hr [[id number]]     Lab Status: No result Specimen: Blood     POCT  Blood Gas (CG8+) [[id number]]  (Abnormal) Collected: 11/04/24 1946    Lab Status: Final result Specimen: Venous Updated: 11/04/24 1955     ph, Rubens ISTAT 7.485     pCO2, Rubens i-STAT 29.7 mm HG      pO2, Rubens i-STAT 39.0 mm HG      BE, i-STAT 0 mmol/L      HCO3, Rubens i-STAT 22.3 mmol/L      CO2, i-STAT 23 mmol/L      O2 Sat, i-STAT 79 %      SODIUM, I-STAT 133 mmol/l      Potassium, i-STAT 4.9 mmol/L      Calcium, Ionized i-STAT 1.25 mmol/L      Hct, i-STAT 40 %      Hgb, i-STAT 13.6 g/dl      Glucose, i-STAT 521 mg/dl      Specimen Type VENOUS    CBC and Platelet [[id number]]  (Abnormal) Collected: 11/04/24 1947    Lab Status: Final result Specimen: Blood from Arm, Left Updated: 11/04/24 1952     WBC 20.96 Thousand/uL      RBC 4.45 Million/uL      Hemoglobin 13.8 g/dL      Hematocrit 40.2 %      MCV 90 fL      MCH 31.0 pg      MCHC 34.3 g/dL      RDW 13.9 %      Platelets 218 Thousands/uL      MPV 11.0 fL     Fingerstick Glucose (POCT) [[id number]]  (Abnormal) Collected: 11/04/24 1938    Lab Status: Final result Specimen: Blood Updated: 11/04/24 1939     POC Glucose >600 mg/dl             CT stroke alert brain   Final Interpretation by Ermias Fishman MD (11/04 2033)      No acute intracranial abnormality.         I personally discussed this study with HONG RICHARDSON on 11/4/2024 813 PM.            Workstation performed: [id number]         CTA stroke alert (head/neck)   Final Interpretation by Ermias Fishman MD (11/04 2033)      No large vessel occlusion in the head or neck.      Unchanged atherosclerotic irregularity as described, with focal moderate to severe stenosis in the intradural right vertebral artery, both A2 segments of the anterior cerebral arteries, left MCA bifurcation, and P2 segment right posterior cerebral    artery.      Mildly beaded contouring of the cervical ICAs which could be seen with fibromuscular dysplasia.      Aneurysmal dilatation of the proximal ascending aorta measuring 4.2 cm in  diameter.      Enlarged main pulmonary artery indicating some degree of pulmonary hypertension.         I personally discussed this study with HONG RICHARDSON on 2024 8:32 PM.            Workstation performed: [id number]         XR chest 1 view portable    (Results Pending)       Procedures    ED Medication and Procedure Management   Prior to Admission Medications   Prescriptions Last Dose Informant Patient Reported? Taking?   Cholecalciferol (VITAMIN D3) 1,000 units tablet   Yes No   Sig: Take 1,000 Units by mouth daily   acetaminophen (TYLENOL) 325 mg tablet   Yes No   Sig: Take 650 mg by mouth every 6 (six) hours as needed for mild pain, headaches or fever   acetaminophen (TYLENOL) 650 mg suppository   Yes No   Sig: Insert 650 mg into the rectum every 4 (four) hours as needed for mild pain   amLODIPine (NORVASC) 5 mg tablet   No No   Sig: Take 1 tablet (5 mg total) by mouth daily   apixaban (ELIQUIS) 5 mg   Yes No   Sig: Take 5 mg by mouth 2 (two) times a day   aspirin 81 mg chewable tablet   No No   Sig: Chew 1 tablet (81 mg total) daily   atorvastatin (LIPITOR) 40 mg tablet   No No   Sig: Take 1 tablet (40 mg total) by mouth every evening   digoxin (LANOXIN) 0.125 mg tablet   Yes No   Sig: Take 187.5 mcg by mouth daily   famotidine (PEPCID) 20 mg tablet   Yes No   Sig: Take 20 mg by mouth daily   levothyroxine 50 mcg tablet   Yes No   Sig: Take 50 mcg by mouth daily   lisinopril (ZESTRIL) 40 mg tablet   No No   Sig: Take 1 tablet (40 mg total) by mouth daily   olopatadine (PATANOL) 0.1 % ophthalmic solution   Yes No   Si drop 2 (two) times a day   omeprazole (PriLOSEC) 20 mg delayed release capsule   Yes No   Sig: Take 20 mg by mouth daily   potassium chloride (MICRO-K) 10 MEQ CR capsule   Yes No   Sig: Take 20 mEq by mouth daily      Facility-Administered Medications: None     Patient's Medications   Discharge Prescriptions    No medications on file     No discharge procedures on file.  ED SEPSIS  DOCUMENTATION   Time reflects when diagnosis was documented in both MDM as applicable and the Disposition within this note       Time User Action Codes Description Comment    11/4/2024  7:43 PM Olayinka Anderson [R29.90] Stroke-like symptoms     11/4/2024  9:03 PM Olayinka Anderson [N17.9] Acute kidney injury (HCC)     11/4/2024  9:03 PM Olayinka Anderson [I48.91] Atrial fibrillation, unspecified type (HCC)     11/4/2024  9:05 PM Olayinka Anderson [E11.65] Uncontrolled diabetes mellitus with hyperglycemia (HCC)                  Olayinka Anderson DO  11/04/24 2133

## 2024-11-05 NOTE — ASSESSMENT & PLAN NOTE
Creatinine 1.84 on admission  Appears dry on exam.  Suspect hypovolemia  500 ml bolus ordered  Placed on gentle fluids throughout the night  Urinary retention protocol  Recheck creatinine in the morning

## 2024-11-05 NOTE — NURSING NOTE
Reason for Notification: Patient's heart rate is increasing in the 130's-150's (AFIB) - also she is having increasing work of breathing, and is more weaker than last assessment - she is also not following commands as before.  Can you evaluate the patient and offer further recommendations.       Provider Name/Role: Renetta Gonzales PA-C    Method of Communication: Verbal    Response:  See Provider Note

## 2024-11-05 NOTE — ASSESSMENT & PLAN NOTE
Sodium 130 on admission  Appears hypovolemic   Continue fluid resuscitation as per sepsis protocol    Risks/benefits discussed with patient or patient surrogate

## 2024-11-05 NOTE — ASSESSMENT & PLAN NOTE
SIRS: Leukocytosis, tachycardia, tachypnea  Procalcitonin 9.13  Lactic pending collection. Difficult stick. US required to obtain.   SOI:   Infiltrate noted on CXR. Pending official read.   Covid/Flu/RSV pending   BC pending   PNA labs pending   UA pending   Abx: IV ceftriaxone

## 2024-11-05 NOTE — ASSESSMENT & PLAN NOTE
Admitted on 11/4 for left-sided weakness/facial droop  Early on 11/5, patient had significant increased work of breathing, tachypnea, fever, and hypoxia  Concerns for pneumonia  Patient initially placed on BiPAP, then transition to high flow nasal cannula with improvement in work of breathing  Repeat chest x-ray pending

## 2024-11-05 NOTE — QUICK NOTE
Notified at 7:46 PM. Response immediate.     Pt is an 85 yo F PMHx HTN, HLD, afib on eliquis who presents for evaluation of stroke-like symptoms. LKW unclear. There were apparently conflicting reports of dysarthria which started over the weekend vs earlier today, unclear exact time. NIHSS 2 for dysarthria and L facial droop. Of note, BG >600 upon arrival to the ED.      CTH showed no evidence of any acute intracranial abnormalities. CTA showed no evidence of LVO. Not a candidate for TNK given unclear LKW and active eliquis use. 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 Recommend admission stroke pathway for further workup, permissive HTN SBP<220, hold eliquis for now until MRI brain w/wo in the AM, continue ASA and statin.

## 2024-11-05 NOTE — CONSULTS
Consultation - Critical Care/ICU   Name: Brianna Meyers 84 y.o. female I MRN: [id number]  Unit/Bed#: -01 SDU I Date of Admission: 11/4/2024   Date of Service: 11/5/2024 I Hospital Day: 1   Consults  Physician Requesting Evaluation: Simba Burch MD   Reason for Evaluation / Principal Problem: altered mental status, increased work of breathing        Assessment & Plan  Stroke-like symptoms  Presented to [facility] from [facility] on 11/4/2024 due to slurred speech and left-sided facial droop  Of note, history of left pontine stroke with recent hospitalization 8/22 to 8/26  Initial CT head/CTA head and neck negative  Admitting team spoke with on-call neurology who recommended holding Eliquis to MRI, but continuing aspirin and Lipitor  Patient failed initial swallow eval therefore aspirin/Lipitor were held  Continue with stroke pathway  Early morning on 11/5, patient became more altered only responding to significant painful stimuli as well as right sided weakness  Held off on repeat CT imaging due to increased work of breathing/uncontrolled A-fib  Formal neurology consult pending  Family updated and made patient DNR/DNI  Sepsis (HCC)  As evidenced by leukocytosis, tachycardia, tachypnea, fever and altered mental status  Initial procalcitonin 9.13 -continue to trend  Initial lactic acid 7.9 -continue with fluid resuscitation as per protocol  COVID/flu/RSV negative  Chest x-ray concerning for multifocal pneumonia  Initially admitted to [facility] however became more lethargic, with altered mental status, fever, hypoxia  Transitioned to ICU initially on BiPAP and then high flow nasal cannula for increased work of breathing  Blood cultures pending  Broaden antibiotics to cefepime/Vanco  Urine antigens pending  Atrial fibrillation with rapid ventricular response (HCC)  History of atrial fibrillation  Presented to the ER with heart rate in the 110s  Received Cardizem 15 mg IV x 1 with improvement  Hold home dose Eliquis  per neurology in the setting of strokelike symptoms  Continue home dose digoxin  Patient continues to have heart rates 120s to 150s -given amnio bolus/drip  Acute respiratory failure with hypoxia ([facility])  Admitted on 11/4 for left-sided weakness/facial droop  Early on 11/5, patient had significant increased work of breathing, tachypnea, fever, and hypoxia  Concerns for pneumonia  Patient initially placed on BiPAP, then transition to high flow nasal cannula with improvement in work of breathing  Repeat chest x-ray pending  DANNI (acute kidney injury) ([facility])  Creatinine 1.84 on admission  Baseline creatinine 0.7 to  Received 500 mL bolus initially  Placed on normal saline at 75 cc/h  Will give sepsis fluids per protocol at 30 mL/hr due to lactic acid 7.9  Examines hypovolemic  Hyponatremia  Sodium 130 on admission  Appears hypovolemic   Continue fluid resuscitation as per sepsis protocol   Type 2 diabetes mellitus with hyperglycemia ([facility])  Lab Results   Component Value Date    HGBA1C 6.8 (H) 08/23/2024       Recent Labs     11/04/24  1938 11/05/24  0008 11/05/24  0204 11/05/24  0325   POCGLU >600* 447* 380* 322*       Blood Sugar Average: Last 72 hrs:  (P) 287.25    Blood sugar 519 on presentation  Repeat hemoglobin A1c  Continue sliding scale coverage  Low threshold to initiate insulin infusion  Essential hypertension  Home regimen includes lisinopril and amlodipine  Hold at this time in the setting of normotension/DANNI  Mild cognitive impairment  Continues with cognitive decline   Answers questions at baseline per chart review  Disposition: Critical care    History of Present Illness   Brianna Meyers is a 84 y.o. who presents with a past medical history of CVA, cognitive decline, A-fib, CKD, hypertension, and type 2 diabetes.  She originally arrived in the emergency room on the evening of 1/4 from her facility with slurred speech, left-sided facial droop, and hyperglycemia.  Of note, the patient was recently admitted in  August with a left pontine stroke.  At that time, the patient had a right-sided facial droop, right arm weakness, and slurred speech.  Per chart review, the patient's symptoms did improve however they were not resolved at time of discharge.  On arrival to the emergency room, the patient was found to be in rapid A-fib with heart rates in the 140s.  She was given Cardizem 15 mg IV x 1 with improvement of her heart rate.  A stroke alert was called and the patient had initial CT head/CTA head and neck negative.  On-call neurology was spoken to by primary care team with she was deemed not a TNK candidate due to being on Eliquis.  Neurology recommended that the Eliquis would be held until an MRI was completed.  Patient's oral medications were held last evening due to her failing her swallow eval.  Patient was admitted to the medical surgical floor but unfortunately early this morning, the patient had significant increased work of breathing, a fever, altered mental status, and worsening rapid A-fib.  Critical care was notified by the primary team and on examination, the patient was breathing in the 40s, with a heart rate in the 150s.  She was nonverbal, with belly breathing and accessory muscle use.  She did not withdrawal to painful stimuli in the left and would minimally respond to sternal rub and right painful stimuli.  Per nursing, the patient's last known normal was 2230 last night.  Repeat chest x-ray done at the bedside showed concern for worsening pneumonia.  Initially, the patient was placed on BiPAP however she was quickly transitioned to high flow nasal cannula.  ABG confirms no hypercarbia, but a metabolic component.  Spoke with on-call ICU attending, who was in agreement to not do repeat CT head imaging at this time due to her worsening respiratory status.  There was significant concern for if the patient will be able to tolerate laying flat due to her work of breathing.  The primary team contacted the  patient's son and daughter-in-law who confirms DNR/DNI.  Patient was transition to high flow nasal cannula and transferred to the ICU for further monitoring/workup.  She will require greater than 2 midnight stay.    At 0330 on 11/5, received phone call from the patient's .  He was updated regarding the recent events.  He stated that the patient does have a living will that states she is a DNR/DNI.  Initially, the  thought about revoking that DNR/DNI however he stated that he would leave it for now and he would reevaluate her when he comes to visit in the morning.    History obtained from chart review.  Review of Systems: Review of Systems   Unable to perform ROS: Mental status change       Historical Information   No past medical history on file. No past surgical history on file.   Current Outpatient Medications   Medication Instructions    acetaminophen (TYLENOL) 650 mg, Oral, Every 6 hours PRN    acetaminophen (TYLENOL) 650 mg, Rectal, Every 4 hours PRN    amLODIPine (NORVASC) 5 mg, Oral, Daily    apixaban (ELIQUIS) 5 mg, Oral, 2 times daily    aspirin 81 mg, Oral, Daily    atorvastatin (LIPITOR) 40 mg, Oral, Every evening    Cholecalciferol (VITAMIN D3) 1,000 Units, Oral, Daily    digoxin (LANOXIN) 187.5 mcg, Oral, Daily    famotidine (PEPCID) 20 mg, Oral, Daily    levothyroxine 50 mcg, Oral, Daily    lisinopril (ZESTRIL) 40 mg, Oral, Daily    olopatadine (PATANOL) 0.1 % ophthalmic solution 1 drop, 2 times daily    omeprazole (PRILOSEC) 20 mg, Oral, Daily    potassium chloride (MICRO-K) 10 MEQ CR capsule 20 mEq, Oral, Daily    Allergies   Allergen Reactions    Sulfamethoxazole-Trimethoprim Hives      Social History     Tobacco Use    Smoking status: Never    Smokeless tobacco: Never   Vaping Use    Vaping status: Never Used   Substance Use Topics    Alcohol use: Never    Drug use: Never    No family history on file.       Objective :                   Vitals I/O      Most Recent Min/Max in 24hrs    Temp (!) 102.8 °F (39.3 °C) Temp  Min: 97.9 °F (36.6 °C)  Max: 102.8 °F (39.3 °C)   Pulse (!) 113 Pulse  Min: 84  Max: 153   Resp (!) 32 Resp  Min: 18  Max: 40   /52 BP  Min: 83/51  Max: 167/106   O2 Sat 98 % SpO2  Min: 89 %  Max: 99 %      Intake/Output Summary (Last 24 hours) at 11/5/2024 0354  Last data filed at 11/5/2024 0341  Gross per 24 hour   Intake 200 ml   Output --   Net 200 ml       Diet NPO    Invasive Monitoring           Physical Exam   Physical Exam  Vitals and nursing note reviewed.   Eyes:      General: Vision grossly intact.      Extraocular Movements: Extraocular movements intact.      Conjunctiva/sclera: Conjunctivae normal.      Comments: Bilateral pupils, 3 mm, sluggish but reactive   Skin:     General: Skin is warm and dry.   HENT:      Head: Normocephalic and atraumatic.      Right Ear: Hearing and tympanic membrane normal.      Left Ear: Hearing and tympanic membrane normal.      Mouth/Throat:      Mouth: Mucous membranes are dry.   Cardiovascular:      Rate and Rhythm: Tachycardia present. Rhythm irregular.   Musculoskeletal:      Comments: Left-sided flaccid  Right side 1 out of 5, withdraws to painful stimuli and No edema noted   Abdominal:      Comments: Bowel sounds hypoactive   Constitutional:       Appearance: She is ill-appearing and toxic-appearing.   Pulmonary:      Comments: On high flow nasal cannula  Bilateral breath sounds coarse, diminished  Neurological:      Mental Status: She is disoriented to person, disoriented to place, disoriented to time and disoriented to situation.      Cranial Nerves: Facial asymmetry present.        Corneal reflex present, cough reflex and gag reflex intact.      Comments: Withdraws to painful sternal rub  Nonverbal  does not follow commands   Genitourinary/Anorectal:  Bruce present.        Diagnostic Studies        Lab Results: I have reviewed the following results:     Medications:  Scheduled PRN   aspirin, 81 mg, Daily  atorvastatin, 40  mg, QPM  cefepime, 2,000 mg, Q24H  digoxin, 187.5 mcg, Daily  insulin lispro, 1-5 Units, Q6H MIGUEL  vancomycin, 25 mg/kg, Once      acetaminophen, 1,000 mg, Q6H PRN  [START ON 11/6/2024] vancomycin, 12.5 mg/kg (Adjusted), Once PRN       Continuous    amiodarone (CORDARONE) 900 mg in dextrose 5 % 500 mL infusion, 1 mg/min, Last Rate: 1 mg/min (11/05/24 0341)   Followed by  amiodarone (CORDARONE) 900 mg in dextrose 5 % 500 mL infusion, 0.5 mg/min  multi-electrolyte, 75 mL/hr         Labs:   CBC    Recent Labs     11/04/24 1947 11/05/24 0142 11/05/24 0223   WBC 20.96* 3.11*  --    HGB 13.8 14.2 12.2   HCT 40.2 45.2 36    181  --      BMP    Recent Labs     11/04/24 1947 11/05/24 0142 11/05/24 0223   SODIUM 130* 135  --    K 4.8 4.5  --    CL 95* 97  --    CO2 23 20* 13*   AGAP 12 18*  --    BUN 43* 45*  --    CREATININE 1.84* 1.88*  --    CALCIUM 10.0 10.0  --        Coags    Recent Labs     11/04/24 1947   INR 2.00*   PTT 39*        Additional Electrolytes  Recent Labs     11/04/24 1946 11/04/24 1947 11/05/24 0142 11/05/24 0223   MG  --  1.7* 2.0  --    CAIONIZED 1.25  --   --  1.30          Blood Gas    No recent results  No recent results LFTs  No recent results    Infectious  Recent Labs     11/04/24 1947 11/05/24 0142   PROCALCITONI 9.13* 11.69*     Glucose  Recent Labs     11/04/24 1947 11/05/24 0142   GLUC 519* 387*

## 2024-11-05 NOTE — QUICK NOTE
Patient's spouse at bedside and wishes to pursue comfort focused care given the patient's rapid decline and known wishes of limited medical interventions. Spouse wishes to continue current treatments to allow time for their son in [place] to come to bedside. We discussed that we will have PRN medications available in the event that Brianna appears to be passing prior to her son arriving at bedside. Patient's spouse in agreement with the plan. Code status updated to level 4, comfort care.

## 2024-11-05 NOTE — H&P
H&P - Hospitalist   Name: Brianna Meyers 84 y.o. female I MRN: [id number]  Unit/Bed#: -01 I Date of Admission: 11/4/2024   Date of Service: 11/5/2024 I Hospital Day: 1     Assessment & Plan  Stroke-like symptoms  Presents from [facility] due to slurred speech and L sided facial droop.   Hx of left pontine stroke.  Admitted 8/22 through 8/26.  Had presented to the hospital with right-sided facial droop, right arm weakness and slurred speech  CT head: No acute intracranial abnormalities  CTA head and neck   No large vessel occlusion in the head or neck.  Unchanged atherosclerotic irregularity as described, with focal moderate to severe stenosis in the intradural right vertebral artery, both A2 segments of the anterior cerebral arteries, left MCA bifurcation, and P2 segment right posterior cerebral artery.  Mildly beaded contouring of the cervical ICAs which could be seen with fibromuscular dysplasia.  Aneurysmal dilatation of the proximal ascending aorta measuring 4.2 cm in diameter.  Enlarged main pulmonary artery indicating some degree of pulmonary hypertension.  Neuro recommends   Hold eliquis till MRI   Continue home aspirin and atorvastatin   Failed dysphagia assessment. If patient does not have improvement by morning then consider NG tube   Neuro checks   Telemetry   Consult neuro, pt/ot, speech and CM  Atrial fibrillation with rapid ventricular response (HCC)  Noted to be in afib with HR into the 110s while in the ER. Received dose of IV cardizem 15 mg with improvement  Electrolytes  K+ - 4.8   Check magnesium level   Home regimen   Digoxin daily   Eliquis   Hold (requested by neuro under stroke pathway)  Type 2 diabetes mellitus with hyperglycemia (HCC)  Lab Results   Component Value Date    HGBA1C 6.8 (H) 08/23/2024       Recent Labs     11/04/24  1938 11/05/24  0008   POCGLU >600* 447*       Blood Sugar Average: Last 72 hrs:  (P) 223.5  Initial    Home regimen: None   Plan   Obtain A1c  Order  10 units of humalog  IV fluids   SSI   Consider initiation of lantus however A1c in August had been 6.8.   DANNI (acute kidney injury) (HCC)  Creatinine 1.84 on admission  Appears dry on exam.  Suspect hypovolemia  500 ml bolus ordered  Placed on gentle fluids throughout the night  Urinary retention protocol  Recheck creatinine in the morning  Pneumonia  Possible PNA noted on CXR. No priors to compare too. Pending official read  On RA  Meeting SIRS   WBC 20.96, tachycardia, tachypnea   Lactic pending   Procal 9.13  Hx of dysphagia. Poor swallow eval in the ER. Suspect possible aspiration   NPO/IVF   Covid/Flu/RSV pending   Consult speech   IV ceftriaxone   Sepsis (HCC)  SIRS: Leukocytosis, tachycardia, tachypnea  Procalcitonin 9.13  Lactic pending collection. Difficult stick. US required to obtain.   SOI:   Infiltrate noted on CXR. Pending official read.   Covid/Flu/RSV pending   BC pending   PNA labs pending   UA pending   Abx: IV ceftriaxone   Essential hypertension  Home regimen:   Lisinopril 40 mg daily  Hold due to DANNI  Amlodipine 5 mg daily  Hold under stroke pathway   Mild cognitive impairment  At baseline  Supportive care and redirection as needed  Hyponatremia  Sodium 130  Appears dry on exam  IV fluids ordered  Continue to monitor to prevent overcorrection      VTE Pharmacologic Prophylaxis: VTE Score: 3 Moderate Risk (Score 3-4) - Pharmacological DVT Prophylaxis Ordered: apixaban (Eliquis).  Code Status: Level 1 - Full Code   Discussion with family: Attempted to update  () via phone. Left voicemail.     Anticipated Length of Stay: Patient will be admitted on an inpatient basis with an anticipated length of stay of greater than 2 midnights secondary to stroke like symptoms, sepsis, PNA.    History of Present Illness   Chief Complaint: slurred speech    Brianna Meyers is a 84 y.o. female with a PMH of CVA, cognitive decline, A-fib, CKD, hypertension, type 2 diabetes who presents from  facility due to slurred speech, sided facial droop and elevated blood sugar.  An admission in August with left pontine stroke.  At that time patient with right-sided facial droop, right arm weakness and slurred speech.  Per notes symptoms appeared to improve but not resolved at time of discharge.  Discharged to facility on dysphagia diet.     In the ER patient noted to be in rapid A-fib.  Received dose of IV Cardizem with improvement.  Stroke alert with negative imaging.  Not a TNK candidate due to Eliquis.  Neurology recommending holding Eliquis until MRI completion/read.  Patient failed dysphagia assessment.  Medications held.  Also noted to have possible pneumonia on chest x-ray.  On room air.  Meeting sepsis criteria.  Started on IV antibiotics.     Patient is chronically a poor historian per notes.  At this time she is oriented to person and place.  Speech is slurred so at times hard to make out what she is attempting to say.     Review of Systems   Unable to perform ROS: Dementia       Historical Information   No past medical history on file.  No past surgical history on file.  Social History     Tobacco Use    Smoking status: Never    Smokeless tobacco: Never   Vaping Use    Vaping status: Never Used   Substance and Sexual Activity    Alcohol use: Never    Drug use: Never    Sexual activity: Not on file     E-Cigarette/Vaping    E-Cigarette Use Never User      E-Cigarette/Vaping Substances    Nicotine No     THC No     CBD No     Flavoring No     Other No     Unknown No      No family history on file.  Social History:  Marital Status: /Civil Union   Occupation: retired  Patient Pre-hospital Living Situation: Long Term Care Facility  Patient Pre-hospital Level of Mobility: unable to be assessed at time of evaluation  Patient Pre-hospital Diet Restrictions: Diabetic    Meds/Allergies   I have reveiwed home medications using records provided by SNF.  Prior to Admission medications    Medication Sig Start  Date End Date Taking? Authorizing Provider   acetaminophen (TYLENOL) 325 mg tablet Take 650 mg by mouth every 6 (six) hours as needed for mild pain, headaches or fever    Historical Provider, MD   acetaminophen (TYLENOL) 650 mg suppository Insert 650 mg into the rectum every 4 (four) hours as needed for mild pain    Historical Provider, MD   amLODIPine (NORVASC) 5 mg tablet Take 1 tablet (5 mg total) by mouth daily 8/27/24   Rosana Pat MD   apixaban (ELIQUIS) 5 mg Take 5 mg by mouth 2 (two) times a day    Historical Provider, MD   aspirin 81 mg chewable tablet Chew 1 tablet (81 mg total) daily 8/27/24   Rosana Pat MD   atorvastatin (LIPITOR) 40 mg tablet Take 1 tablet (40 mg total) by mouth every evening 8/26/24   Rosana Pat MD   Cholecalciferol (VITAMIN D3) 1,000 units tablet Take 1,000 Units by mouth daily    Historical Provider, MD   digoxin (LANOXIN) 0.125 mg tablet Take 187.5 mcg by mouth daily    Historical Provider, MD   famotidine (PEPCID) 20 mg tablet Take 20 mg by mouth daily    Historical Provider, MD   levothyroxine 50 mcg tablet Take 50 mcg by mouth daily    Historical Provider, MD   lisinopril (ZESTRIL) 40 mg tablet Take 1 tablet (40 mg total) by mouth daily 8/27/24   Rosana Pat MD   olopatadine (PATANOL) 0.1 % ophthalmic solution 1 drop 2 (two) times a day    Historical Provider, MD   omeprazole (PriLOSEC) 20 mg delayed release capsule Take 20 mg by mouth daily    Historical Provider, MD   potassium chloride (MICRO-K) 10 MEQ CR capsule Take 20 mEq by mouth daily    Historical Provider, MD     Allergies   Allergen Reactions    Sulfamethoxazole-Trimethoprim Hives       Objective :  Temp:  [97.9 °F (36.6 °C)-98.8 °F (37.1 °C)] 98.8 °F (37.1 °C)  HR:  [] 84  BP: ()/(55-75) 130/75  Resp:  [18-22] 18  SpO2:  [89 %-96 %] 89 %  O2 Device: Nasal cannula    Physical Exam  Vitals and nursing note reviewed.   Constitutional:       Appearance: Normal appearance.  She is underweight.   HENT:      Head: Normocephalic.   Eyes:      Extraocular Movements: Extraocular movements intact.      Pupils: Pupils are equal, round, and reactive to light.   Cardiovascular:      Rate and Rhythm: Normal rate and regular rhythm.      Heart sounds: No murmur heard.  Pulmonary:      Effort: Pulmonary effort is normal. No respiratory distress.      Breath sounds: Normal breath sounds. No wheezing.   Abdominal:      General: Bowel sounds are normal. There is no distension.      Tenderness: There is no abdominal tenderness. There is no guarding.   Musculoskeletal:         General: Normal range of motion.      Cervical back: Normal range of motion.      Right lower leg: No edema.      Left lower leg: No edema.   Skin:     General: Skin is warm.   Neurological:      General: No focal deficit present.      Mental Status: Mental status is at baseline. She is lethargic and disoriented.      Cranial Nerves: Dysarthria present.      Motor: Weakness present.      Comments: Generalized weakness. Appears mildly weaker on LUE/LLE > RUE/RLE          Lines/Drains:            Lab Results: I have reviewed the following results:  Results from last 7 days   Lab Units 11/04/24 1947   WBC Thousand/uL 20.96*   HEMOGLOBIN g/dL 13.8   HEMATOCRIT % 40.2   PLATELETS Thousands/uL 218     Results from last 7 days   Lab Units 11/04/24 1947   SODIUM mmol/L 130*   POTASSIUM mmol/L 4.8   CHLORIDE mmol/L 95*   CO2 mmol/L 23   BUN mg/dL 43*   CREATININE mg/dL 1.84*   ANION GAP mmol/L 12   CALCIUM mg/dL 10.0   GLUCOSE RANDOM mg/dL 519*     Results from last 7 days   Lab Units 11/04/24 1947   INR  2.00*     Results from last 7 days   Lab Units 11/05/24  0008 11/04/24 1938   POC GLUCOSE mg/dl 447* >600*     Lab Results   Component Value Date    HGBA1C 6.8 (H) 08/23/2024     Results from last 7 days   Lab Units 11/04/24 1947   PROCALCITONIN ng/ml 9.13*       Imaging Results Review: I reviewed radiology reports from this  admission including: CT head.  Other Study Results Review: EKG was reviewed.     Administrative Statements   I have spent a total time of 75 minutes in caring for this patient on the day of the visit/encounter including Risks and benefits of tx options, Instructions for management, Documenting in the medical record, and Obtaining or reviewing history  .    ** Please Note: This note has been constructed using a voice recognition system. **

## 2024-11-05 NOTE — ASSESSMENT & PLAN NOTE
Creatinine 1.84 on admission  Baseline creatinine 0.7 to  Received 500 mL bolus initially  Placed on normal saline at 75 cc/h  Will give sepsis fluids per protocol at 30 mL/hr due to lactic acid 7.9  Examines hypovolemic

## 2024-11-06 PROBLEM — Z51.5 END OF LIFE CARE: Status: ACTIVE | Noted: 2024-01-01

## 2024-11-06 PROBLEM — I95.9 HYPOTENSION: Status: ACTIVE | Noted: 2024-01-01

## 2024-11-06 NOTE — CASE MANAGEMENT
Case Management Progress Note    Patient name Brianna Meyers  Location /-01 MRN [id number]  : 1940 Date 2024       LOS (days): 2  Geometric Mean LOS (GMLOS) (days): 4.9  Days to GMLOS:3.2        OBJECTIVE:        Current admission status: Inpatient  Preferred Pharmacy:   UNKNOWN - FOLLOW UP PRIOR TO DISCHARGE TO E-PRESCRIBE  No address on file      Primary Care Provider: Rosario Shanks DO    Primary Insurance: [facility]  Secondary Insurance: [facility]    PROGRESS NOTE: Consult for hospice via CM. Referral sent to [facility] hospice for evaluation for IPU if a bed is open.      Cm will also discuss returning to [facility] on Comfort care if  accepts.

## 2024-11-06 NOTE — ASSESSMENT & PLAN NOTE
Pt had cva with worsening mentation, acute respiratory failure due to pna, afib with rvr and hypotension  Her  decided for comfort care  She was started on comfort medications and has been using iv medications   Will consult hospice liaison

## 2024-11-06 NOTE — ASSESSMENT & PLAN NOTE
Creatinine 1.84 on admission  Pt became hypotensive and creatinine worsened   decided for comfort care

## 2024-11-06 NOTE — ASSESSMENT & PLAN NOTE
H/o afib  Presented to the ed with heart rate 110s that was treated with cardizem 15m iv x1 with improvement  Pt later had worsened afib with rvr of 120-150 in which she was started on amiodarone.   She became hypotensive and had acute respiratory failure in which her  ultimately decided for comfort care

## 2024-11-06 NOTE — PLAN OF CARE
Problem: PAIN - ADULT  Goal: Verbalizes/displays adequate comfort level or baseline comfort level  Description: Interventions:  - Encourage patient to monitor pain and request assistance  - Assess pain using appropriate pain scale  - Administer analgesics based on type and severity of pain and evaluate response  - Implement non-pharmacological measures as appropriate and evaluate response  - Consider cultural and social influences on pain and pain management  - Notify physician/advanced practitioner if interventions unsuccessful or patient reports new pain  Outcome: Progressing     Problem: INFECTION - ADULT  Goal: Absence or prevention of progression during hospitalization  Description: INTERVENTIONS:  - Assess and monitor for signs and symptoms of infection  - Monitor lab/diagnostic results  - Monitor all insertion sites, i.e. indwelling lines, tubes, and drains  - Monitor endotracheal if appropriate and nasal secretions for changes in amount and color  - [place] appropriate cooling/warming therapies per order  - Administer medications as ordered  - Instruct and encourage patient and family to use good hand hygiene technique  - Identify and instruct in appropriate isolation precautions for identified infection/condition  Outcome: Progressing  Goal: Absence of fever/infection during neutropenic period  Description: INTERVENTIONS:  - Monitor WBC    Outcome: Progressing     Problem: SAFETY ADULT  Goal: Patient will remain free of falls  Description: INTERVENTIONS:  - Educate patient/family on patient safety including physical limitations  - Instruct patient to call for assistance with activity   - Consult OT/PT to assist with strengthening/mobility   - Keep Call bell within reach  - Keep bed low and locked with side rails adjusted as appropriate  - Keep care items and personal belongings within reach  - Initiate and maintain comfort rounds  - Make Fall Risk Sign visible to staff  - Offer Toileting every x Hours,  in advance of need  - Initiate/Maintain xxalarm  - Obtain necessary fall risk management equipment: x  - Apply yellow socks and bracelet for high fall risk patients  - Consider moving patient to room near nurses station  Outcome: Progressing  Goal: Maintain or return to baseline ADL function  Description: INTERVENTIONS:  -  Assess patient's ability to carry out ADLs; assess patient's baseline for ADL function and identify physical deficits which impact ability to perform ADLs (bathing, care of mouth/teeth, toileting, grooming, dressing, etc.)  - Assess/evaluate cause of self-care deficits   - Assess range of motion  - Assess patient's mobility; develop plan if impaired  - Assess patient's need for assistive devices and provide as appropriate  - Encourage maximum independence but intervene and supervise when necessary  - Involve family in performance of ADLs  - Assess for home care needs following discharge   - Consider OT consult to assist with ADL evaluation and planning for discharge  - Provide patient education as appropriate  Outcome: Progressing  Goal: Maintains/Returns to pre admission functional level  Description: INTERVENTIONS:  - Perform AM-PAC 6 Click Basic Mobility/ Daily Activity assessment daily.  - Set and communicate daily mobility goal to care team and patient/family/caregiver.   - Collaborate with rehabilitation services on mobility goals if consulted  - Perform Range of Motion x times a day.  - Reposition patient every x hours.  - Dangle patient x times a day  - Stand patient x times a day  - Ambulate patient x times a day  - Out of bed to chair x times a day   - Out of bed for meals xxx times a day  - Out of bed for toileting  - Record patient progress and toleration of activity level   Outcome: Progressing     Problem: DISCHARGE PLANNING  Goal: Discharge to home or other facility with appropriate resources  Description: INTERVENTIONS:  - Identify barriers to discharge w/patient and caregiver  -  Arrange for needed discharge resources and transportation as appropriate  - Identify discharge learning needs (meds, wound care, etc.)  - Arrange for interpretive services to assist at discharge as needed  - Refer to Case Management Department for coordinating discharge planning if the patient needs post-hospital services based on physician/advanced practitioner order or complex needs related to functional status, cognitive ability, or social support system  Outcome: Progressing     Problem: Knowledge Deficit  Goal: Patient/family/caregiver demonstrates understanding of disease process, treatment plan, medications, and discharge instructions  Description: Complete learning assessment and assess knowledge base.  Interventions:  - Provide teaching at level of understanding  - Provide teaching via preferred learning methods  Outcome: Progressing     Problem: Prexisting or High Potential for Compromised Skin Integrity  Goal: Skin integrity is maintained or improved  Description: INTERVENTIONS:  - Identify patients at risk for skin breakdown  - Assess and monitor skin integrity  - Assess and monitor nutrition and hydration status  - Monitor labs   - Assess for incontinence   - Turn and reposition patient  - Assist with mobility/ambulation  - Relieve pressure over bony prominences  - Avoid friction and shearing  - Provide appropriate hygiene as needed including keeping skin clean and dry  - Evaluate need for skin moisturizer/barrier cream  - Collaborate with interdisciplinary team   - Patient/family teaching  - Consider wound care consult   Outcome: Progressing     Problem: Potential for Falls  Goal: Patient will remain free of falls  Description: INTERVENTIONS:  - Educate patient/family on patient safety including physical limitations  - Instruct patient to call for assistance with activity   - Consult OT/PT to assist with strengthening/mobility   - Keep Call bell within reach  - Keep bed low and locked with side rails  adjusted as appropriate  - Keep care items and personal belongings within reach  - Initiate and maintain comfort rounds  - Make Fall Risk Sign visible to staff  - Offer Toileting every x Hours, in advance of need  - Initiate/Maintain xalarm  - Obtain necessary fall risk management equipment: x  - Apply yellow socks and bracelet for high fall risk patients  - Consider moving patient to room near nurses station  Outcome: Progressing

## 2024-11-06 NOTE — ASSESSMENT & PLAN NOTE
There was concern for multifocal pna.   She had been treated with ceftriaxone but despite this had worsening respiratory failure  She was started on bipap and then high flow  Her medications were broadened to cefepime and vanco  She became hypotensive and unresponsive  Her  decided for comfort care

## 2024-11-06 NOTE — ASSESSMENT & PLAN NOTE
Presents from [facility] due to slurred speech and L sided facial droop.   Hx of left pontine stroke.  Admitted 8/22 through 8/26.  Had presented to the hospital with right-sided facial droop, right arm weakness and slurred speech  CT head: No acute intracranial abnormalities  CTA head and neck   No large vessel occlusion in the head or neck.  Unchanged atherosclerotic irregularity as described, with focal moderate to severe stenosis in the intradural right vertebral artery, both A2 segments of the anterior cerebral arteries, left MCA bifurcation, and P2 segment right posterior cerebral artery.  Mildly beaded contouring of the cervical ICAs which could be seen with fibromuscular dysplasia.  Aneurysmal dilatation of the proximal ascending aorta measuring 4.2 cm in diameter.  Enlarged main pulmonary artery indicating some degree of pulmonary hypertension.  Neurology evaluated her and stated to hold eliquis and continue asa/lipitor  Pts cognition continued to decline and she was unable to pass her swallow eval. Asa and lipitor were held  She continued to deteriorate and on 11/5 she was only responding to significant painful stimuli  Her  decided for comfort care. He understands that she will be kept comfortable until she passes away  Lab draws were d/yeyo and meds for comfort were continued

## 2024-11-06 NOTE — ASSESSMENT & PLAN NOTE
Pt had been on lisinopril and amlodipine at home  Both were held   She had ongoing hypotension despite fluids and her  decided for comfort care

## 2024-11-06 NOTE — PLAN OF CARE
Problem: PAIN - ADULT  Goal: Verbalizes/displays adequate comfort level or baseline comfort level  Description: Interventions:  - Encourage patient to monitor pain and request assistance  - Assess pain using appropriate pain scale  - Administer analgesics based on type and severity of pain and evaluate response  - Implement non-pharmacological measures as appropriate and evaluate response  - Consider cultural and social influences on pain and pain management  - Notify physician/advanced practitioner if interventions unsuccessful or patient reports new pain  Outcome: Not Progressing     Problem: INFECTION - ADULT  Goal: Absence or prevention of progression during hospitalization  Description: INTERVENTIONS:  - Assess and monitor for signs and symptoms of infection  - Monitor lab/diagnostic results  - Monitor all insertion sites, i.e. indwelling lines, tubes, and drains  - Monitor endotracheal if appropriate and nasal secretions for changes in amount and color  - [place] appropriate cooling/warming therapies per order  - Administer medications as ordered  - Instruct and encourage patient and family to use good hand hygiene technique  - Identify and instruct in appropriate isolation precautions for identified infection/condition  Outcome: Not Progressing  Goal: Absence of fever/infection during neutropenic period  Description: INTERVENTIONS:  - Monitor WBC    Outcome: Not Progressing     Problem: SAFETY ADULT  Goal: Patient will remain free of falls  Description: INTERVENTIONS:  - Educate patient/family on patient safety including physical limitations  - Instruct patient to call for assistance with activity   - Consult OT/PT to assist with strengthening/mobility   - Keep Call bell within reach  - Keep bed low and locked with side rails adjusted as appropriate  - Keep care items and personal belongings within reach  - Initiate and maintain comfort rounds  - Make Fall Risk Sign visible to staff  - Offer Toileting  every 2 Hours, in advance of need  - Initiate/Maintain bed alarm  - Obtain necessary fall risk management equipment  - Apply yellow socks and bracelet for high fall risk patients  - Consider moving patient to room near nurses station  Outcome: Not Progressing  Goal: Maintain or return to baseline ADL function  Description: INTERVENTIONS:  -  Assess patient's ability to carry out ADLs; assess patient's baseline for ADL function and identify physical deficits which impact ability to perform ADLs (bathing, care of mouth/teeth, toileting, grooming, dressing, etc.)  - Assess/evaluate cause of self-care deficits   - Assess range of motion  - Assess patient's mobility; develop plan if impaired  - Assess patient's need for assistive devices and provide as appropriate  - Encourage maximum independence but intervene and supervise when necessary  - Involve family in performance of ADLs  - Assess for home care needs following discharge   - Consider OT consult to assist with ADL evaluation and planning for discharge  - Provide patient education as appropriate  Outcome: Not Progressing  Goal: Maintains/Returns to pre admission functional level  Description: INTERVENTIONS:  - Perform AM-PAC 6 Click Basic Mobility/ Daily Activity assessment daily.  - Set and communicate daily mobility goal to care team and patient/family/caregiver.   - Collaborate with rehabilitation services on mobility goals if consulted  - Perform Range of Motion 3 times a day.  - Reposition patient every 2 hours.  - Dangle patient 3 times a day  - Stand patient 3 times a day  - Ambulate patient 3 times a day  - Out of bed to chair 3 times a day   - Out of bed for meals 3 times a day  - Out of bed for toileting  - Record patient progress and toleration of activity level   Outcome: Not Progressing     Problem: DISCHARGE PLANNING  Goal: Discharge to home or other facility with appropriate resources  Description: INTERVENTIONS:  - Identify barriers to discharge  w/patient and caregiver  - Arrange for needed discharge resources and transportation as appropriate  - Identify discharge learning needs (meds, wound care, etc.)  - Arrange for interpretive services to assist at discharge as needed  - Refer to Case Management Department for coordinating discharge planning if the patient needs post-hospital services based on physician/advanced practitioner order or complex needs related to functional status, cognitive ability, or social support system  Outcome: Not Progressing     Problem: Knowledge Deficit  Goal: Patient/family/caregiver demonstrates understanding of disease process, treatment plan, medications, and discharge instructions  Description: Complete learning assessment and assess knowledge base.  Interventions:  - Provide teaching at level of understanding  - Provide teaching via preferred learning methods  Outcome: Not Progressing     Problem: Prexisting or High Potential for Compromised Skin Integrity  Goal: Skin integrity is maintained or improved  Description: INTERVENTIONS:  - Identify patients at risk for skin breakdown  - Assess and monitor skin integrity  - Assess and monitor nutrition and hydration status  - Monitor labs   - Assess for incontinence   - Turn and reposition patient  - Assist with mobility/ambulation  - Relieve pressure over bony prominences  - Avoid friction and shearing  - Provide appropriate hygiene as needed including keeping skin clean and dry  - Evaluate need for skin moisturizer/barrier cream  - Collaborate with interdisciplinary team   - Patient/family teaching  - Consider wound care consult   Outcome: Not Progressing

## 2024-11-06 NOTE — PROGRESS NOTES
Progress Note - Hospitalist   Name: Brianna Meyers 84 y.o. female I MRN: [id number]  Unit/Bed#: -01 I Date of Admission: 11/4/2024   Date of Service: 11/6/2024 I Hospital Day: 2    Assessment & Plan  Stroke-like symptoms  Presents from [facility] due to slurred speech and L sided facial droop.   Hx of left pontine stroke.  Admitted 8/22 through 8/26.  Had presented to the hospital with right-sided facial droop, right arm weakness and slurred speech  CT head: No acute intracranial abnormalities  CTA head and neck   No large vessel occlusion in the head or neck.  Unchanged atherosclerotic irregularity as described, with focal moderate to severe stenosis in the intradural right vertebral artery, both A2 segments of the anterior cerebral arteries, left MCA bifurcation, and P2 segment right posterior cerebral artery.  Mildly beaded contouring of the cervical ICAs which could be seen with fibromuscular dysplasia.  Aneurysmal dilatation of the proximal ascending aorta measuring 4.2 cm in diameter.  Enlarged main pulmonary artery indicating some degree of pulmonary hypertension.  Neurology evaluated her and stated to hold eliquis and continue asa/lipitor  Pts cognition continued to decline and she was unable to pass her swallow eval. Asa and lipitor were held  She continued to deteriorate and on 11/5 she was only responding to significant painful stimuli  Her  decided for comfort care. He understands that she will be kept comfortable until she passes away  Lab draws were d/yeyo and meds for comfort were continued   Atrial fibrillation with rapid ventricular response (HCC)  H/o afib  Presented to the ed with heart rate 110s that was treated with cardizem 15m iv x1 with improvement  Pt later had worsened afib with rvr of 120-150 in which she was started on amiodarone.   She became hypotensive and had acute respiratory failure in which her  ultimately decided for comfort care  Type 2 diabetes mellitus with  hyperglycemia ([facility])  Lab Results   Component Value Date    HGBA1C 8.1 (H) 11/04/2024       Recent Labs     11/05/24  0008 11/05/24  0204 11/05/24  0325 11/05/24  0558   POCGLU 447* 380* 322* 250*       Blood Sugar Average: Last 72 hrs:  (P) 279.8  Currently comfort care   DANNI (acute kidney injury) ([facility])  Creatinine 1.84 on admission  Pt became hypotensive and creatinine worsened   decided for comfort care   Sepsis ([facility])  There was concern for multifocal pna.   She had been treated with ceftriaxone but despite this had worsening respiratory failure  She was started on bipap and then high flow  Her medications were broadened to cefepime and vanco  She became hypotensive and unresponsive  Her  decided for comfort care   Hypotension  Pt had been on lisinopril and amlodipine at home  Both were held   She had ongoing hypotension despite fluids and her  decided for comfort care   Hyponatremia  Sodium 130  Treated with iv fluids  Ultimately decided for comfort care   Acute respiratory failure with hypoxia ([facility])  Please see note under sepsis   End of life care  Pt had cva with worsening mentation, acute respiratory failure due to pna, afib with rvr and hypotension  Her  decided for comfort care  She was started on comfort medications and has been using iv medications   Will consult hospice liaison     VTE Pharmacologic Prophylaxis: VTE Score: 3 comfort care     Patient Centered Rounds:  will update her nurse       Education and Discussions with Family / Patient: Updated  () at bedside.    Current Length of Stay: 2 day(s)  Current Patient Status: Inpatient     Discharge Plan: comfort care. Will consult hospice liaison     Code Status: Level 4 - Comfort Care    Subjective   Pt resting comfortably. Has been using iv dilaudid. No problems reported per      Objective :  HR:  [112-132] 123  Resp:  [36] 36  SpO2:  [82 %-90 %] 89 %  O2 Device: None (Room air)    Body mass index  is 23.24 kg/m².     Input and Output Summary (last 24 hours):     Intake/Output Summary (Last 24 hours) at 11/6/2024 1052  Last data filed at 11/5/2024 2200  Gross per 24 hour   Intake 0 ml   Output --   Net 0 ml       Physical Exam  Constitutional:       Comments: Minimal response to noxious stimuli    HENT:      Head: Normocephalic and atraumatic.   Eyes:      Pupils: Pupils are equal, round, and reactive to light.   Cardiovascular:      Rate and Rhythm: Normal rate. Rhythm irregular.      Heart sounds: No murmur heard.     No friction rub. No gallop.   Pulmonary:      Effort: No respiratory distress.      Breath sounds: No stridor. Rhonchi present. No wheezing or rales.   Abdominal:      General: Bowel sounds are normal. There is no distension.      Palpations: Abdomen is soft.      Tenderness: There is no abdominal tenderness. There is no guarding or rebound.   Neurological:      Comments: Minimal response to noxious stimuli            Lines/Drains:  Lines/Drains/Airways       Active Status       Name Placement date Placement time Site Days    Urethral Catheter 11/05/24  0307  --  1                  Urinary Catheter:  Goal for removal: continue vera due to  comfort care.                  Lab Results: I have reviewed the following results:   Results from last 7 days   Lab Units 11/05/24  0451 11/05/24  0223 11/05/24  0142   WBC Thousand/uL 16.53*  --  3.11*   HEMOGLOBIN g/dL 12.8  --  14.2   I STAT HEMOGLOBIN   --    < >  --    HEMATOCRIT % 39.3  --  45.2   HEMATOCRIT, ISTAT   --    < >  --    PLATELETS Thousands/uL 161  --  181   LYMPHO PCT %  --   --  10*   MONO PCT %  --   --  1*   EOS PCT %  --   --  0    < > = values in this interval not displayed.     Results from last 7 days   Lab Units 11/05/24  0451   SODIUM mmol/L 137   POTASSIUM mmol/L 3.6   CHLORIDE mmol/L 99   CO2 mmol/L 18*   BUN mg/dL 46*   CREATININE mg/dL 1.93*   ANION GAP mmol/L 20*   CALCIUM mg/dL 9.1   GLUCOSE RANDOM mg/dL 295*     Results  from last 7 days   Lab Units 11/04/24 1947   INR  2.00*     Results from last 7 days   Lab Units 11/05/24  0558 11/05/24  0325 11/05/24  0204 11/05/24  0008 11/04/24  1938   POC GLUCOSE mg/dl 250* 322* 380* 447* >600*     Results from last 7 days   Lab Units 11/04/24 1947   HEMOGLOBIN A1C % 8.1*     Results from last 7 days   Lab Units 11/05/24  0451 11/05/24  0142 11/04/24 1947   LACTIC ACID mmol/L 9.3* 7.9*  --    PROCALCITONIN ng/ml  --  11.69* 9.13*       Recent Cultures (last 7 days):   Results from last 7 days   Lab Units 11/05/24  0312 11/05/24  0309 11/05/24 0142   BLOOD CULTURE   --   --  No Growth at 24 hrs.  No Growth at 24 hrs.   URINE CULTURE  >100,000 cfu/ml Escherichia coli*  >100,000 cfu/ml Gamma Hemolytic Streptococcus NOT Enterococcus  <10,000 cfu/ml Proteus species*  --   --    LEGIONELLA URINARY ANTIGEN   --  Negative  --        Imaging Results Review: No pertinent imaging studies reviewed.  Other Study Results Review: No additional pertinent studies reviewed.    Last 24 Hours Medication List:     Current Facility-Administered Medications:     haloperidol lactate (HALDOL) injection 0.5 mg, Q2H PRN    HYDROmorphone (DILAUDID) injection 0.3 mg, Q1H PRN    LORazepam (ATIVAN) injection 1 mg, Q10 Min PRN    Administrative Statements   Today, Patient Was Seen By: Bel Haro DO

## 2024-11-06 NOTE — ASSESSMENT & PLAN NOTE
Lab Results   Component Value Date    HGBA1C 8.1 (H) 11/04/2024       Recent Labs     11/05/24  0008 11/05/24  0204 11/05/24  0325 11/05/24  0558   POCGLU 447* 380* 322* 250*       Blood Sugar Average: Last 72 hrs:  (P) 279.8  Currently comfort care

## 2024-11-06 NOTE — HOSPICE NOTE
Hospice referral received. Pt is approved for the hospice house by [facility] Guzman and [employer]. Met with pts spouse and 2 of his grand children at bedside. Reviewed hospice care and services per Medicare guidelines. Spouse Naeem does not want to make a decision until his sons arrive later today. Hospice liaison will follow up tomorrow. Updated GARRETT Mccormick of the same.

## 2024-11-06 NOTE — CASE MANAGEMENT
Case Management Progress Note    Patient name Brianna Meyers  Location /-01 MRN [id number]  : 1940 Date 2024       LOS (days): 2  Geometric Mean LOS (GMLOS) (days): 4.9  Days to GMLOS:3.2        OBJECTIVE:        Current admission status: Inpatient  Preferred Pharmacy:   UNKNOWN - FOLLOW UP PRIOR TO DISCHARGE TO E-PRESCRIBE  No address on file      Primary Care Provider: Rosario Shanks DO    Primary Insurance: [employer]  [facility]    PROGRESS NOTE:  Hospice met with pts spouse. He wants to speak with his sons who will be here later this afternoon. Hospice to follow up with CM. Possible dc tomorrow pending plan once family discusses.

## 2024-11-06 NOTE — QUICK NOTE
Progress Note - Critical Care/ICU   Name: Brianna Meyers 84 y.o. female I MRN: [id number]  Unit/Bed#: -01 SDU I Date of Admission: 11/4/2024   Date of Service: 11/5/2024 I Hospital Day: 1       Critical Care Interval Transfer Note:    Brief Hospital Summary:  Brianna Meyers is a 84 y.o. who presents with a past medical history of CVA, cognitive decline, A-fib, CKD, hypertension, and type 2 diabetes.  She originally arrived in the emergency room on the evening of 1/4 from her facility with slurred speech, left-sided facial droop, and hyperglycemia.  Of note, the patient was recently admitted in August with a left pontine stroke.  At that time, the patient had a right-sided facial droop, right arm weakness, and slurred speech.  Per chart review, the patient's symptoms did improve however they were not resolved at time of discharge.  On arrival to the emergency room, the patient was found to be in rapid A-fib with heart rates in the 140s.  She was given Cardizem 15 mg IV x 1 with improvement of her heart rate.  A stroke alert was called and the patient had initial CT head/CTA head and neck negative.  On-call neurology was spoken to by primary care team with she was deemed not a TNK candidate due to being on Eliquis.  Neurology recommended that the Eliquis would be held until an MRI was completed.  Patient's oral medications were held last evening due to her failing her swallow eval.  Patient was admitted to the medical surgical floor but unfortunately early this morning, the patient had significant increased work of breathing, a fever, altered mental status, and worsening rapid A-fib.  Critical care was notified by the primary team and on examination, the patient was breathing in the 40s, with a heart rate in the 150s.  She was nonverbal, with belly breathing and accessory muscle use.  She did not withdrawal to painful stimuli in the left and would minimally respond to sternal rub and right painful stimuli.  Per  nursing, the patient's last known normal was 2230 last night.  Repeat chest x-ray done at the bedside showed concern for worsening pneumonia.  Initially, the patient was placed on BiPAP however she was quickly transitioned to high flow nasal cannula.     Unfortunately, she continued to be hypotensive and required significant amounts of vasopressors.  Eventually the patient's  arrived at the bedside early this morning, and he transitioned her to comfort care.    Barriers to discharge:   Comfort care      Consults: IP CONSULT TO CASE MANAGEMENT    Recommended to review admission imaging for incidental findings and document in discharge navigator: Chart reviewed, no known incidental findings noted at this time.      Discharge Plan: on comfort care  [place] Plan: end of life care       Patient seen and evaluated by Critical Care today and deemed to be appropriate for transfer to Med Surg. Spoke to Renetta ALEGRIA from [facility] to accept transfer. Critical care can be contacted via SecureChat with any questions or concerns.

## 2024-11-07 PROBLEM — Z51.5 COMFORT MEASURES ONLY STATUS: Status: ACTIVE | Noted: 2024-01-01

## 2024-11-07 NOTE — CASE MANAGEMENT
Case Management Discharge Planning Note    Patient name Brianna Meyers  Location /-01 MRN [id number]  : 1940 Date 2024       Current Admission Date: 2024  Current Admission Diagnosis:Stroke-like symptoms   Patient Active Problem List    Diagnosis Date Noted Date Diagnosed    Comfort measures only status 2024     End of life care 2024     Acute respiratory failure with hypoxia (HCC) 2024     DANNI (acute kidney injury) ([facility]) 2024     Stroke-like symptoms 2024     Pneumonia 2024     Sepsis ([facility]) 2024     Hyponatremia 2024     Type 2 diabetes mellitus with hyperglycemia ([facility]) 2024     Hypotension 2024     Persistent atrial fibrillation ([facility]) 2024     Left pontine stroke ([facility]) 2024     Mild cognitive impairment 2024     Mass of upper outer quadrant of left breast 2023     Atrial fibrillation with rapid ventricular response ([facility]) 2021       LOS (days): 3  Geometric Mean LOS (GMLOS) (days): 4.9  Days to GMLOS:2.2     OBJECTIVE:  Risk of Unplanned Readmission Score: 24.12         Current admission status: Inpatient   Preferred Pharmacy:   UNKNOWN - FOLLOW UP PRIOR TO DISCHARGE TO E-PRESCRIBE  No address on file      Primary Care Provider: Rosario Shanks DO    [facility]  Secondary Insurance: [facility]    DISCHARGE DETAILS:    Discharge planning discussed with:: patient spouse  Freedom of Choice: Yes  Comments - Freedom of Choice: discussed plan for hospice. Decided on hospice house.  CM contacted family/caregiver?: Yes  Were Treatment Team discharge recommendations reviewed with patient/caregiver?: Yes  Did patient/caregiver verbalize understanding of patient care needs?: Yes       Contacts  Patient Contacts: Naeem Meyers  Relationship to Patient:: Family  Contact Method: In Person  Reason/Outcome: Discharge Planning, Emergency Contact,  Continuity of Care    Requested Home Health Care         Is the patient interested in HHC at discharge?: No    DME Referral Provided  Referral made for DME?: No    Other Referral/Resources/Interventions Provided:  Interventions: Hospice  Referral Comments: Pt is to dc to Hospice house at 1pm via [facility] transport. PTs RN and Provider aware of this plan. Family remains at bedside.         Treatment Team Recommendation: Hospice  Discharge Destination Plan:: Hospice  Transport at Discharge : BLS Ambulance                                      Additional Comments: Pt dc to hospice house with [facility]. Hospice met wtih family and made the proper arrangements. This CM arranged transport for 1pm with [facility]. All tranpsort paperwork including OOH DNR given to pts nurse to transport. No furhter needs at this time. CM following DC

## 2024-11-07 NOTE — PLAN OF CARE
Problem: PAIN - ADULT  Goal: Verbalizes/displays adequate comfort level or baseline comfort level  Description: Interventions:  - Encourage patient to monitor pain and request assistance  - Assess pain using appropriate pain scale  - Administer analgesics based on type and severity of pain and evaluate response  - Implement non-pharmacological measures as appropriate and evaluate response  - Consider cultural and social influences on pain and pain management  - Notify physician/advanced practitioner if interventions unsuccessful or patient reports new pain  Outcome: Progressing     Problem: INFECTION - ADULT  Goal: Absence or prevention of progression during hospitalization  Description: INTERVENTIONS:  - Assess and monitor for signs and symptoms of infection  - Monitor lab/diagnostic results  - Monitor all insertion sites, i.e. indwelling lines, tubes, and drains  - Monitor endotracheal if appropriate and nasal secretions for changes in amount and color  - [place] appropriate cooling/warming therapies per order  - Administer medications as ordered  - Instruct and encourage patient and family to use good hand hygiene technique  - Identify and instruct in appropriate isolation precautions for identified infection/condition  Outcome: Progressing  Goal: Absence of fever/infection during neutropenic period  Description: INTERVENTIONS:  - Monitor WBC    Outcome: Progressing     Problem: SAFETY ADULT  Goal: Patient will remain free of falls  Description: INTERVENTIONS:  - Educate patient/family on patient safety including physical limitations  - Instruct patient to call for assistance with activity   - Consult OT/PT to assist with strengthening/mobility   - Keep Call bell within reach  - Keep bed low and locked with side rails adjusted as appropriate  - Keep care items and personal belongings within reach  - Initiate and maintain comfort rounds  - Make Fall Risk Sign visible to staff  - Offer Toileting every x Hours,  in advance of need  - Initiate/Maintain xalarm  - Obtain necessary fall risk management equipment: x  - Apply yellow socks and bracelet for high fall risk patients  - Consider moving patient to room near nurses station  Outcome: Progressing  Goal: Maintain or return to baseline ADL function  Description: INTERVENTIONS:  -  Assess patient's ability to carry out ADLs; assess patient's baseline for ADL function and identify physical deficits which impact ability to perform ADLs (bathing, care of mouth/teeth, toileting, grooming, dressing, etc.)  - Assess/evaluate cause of self-care deficits   - Assess range of motion  - Assess patient's mobility; develop plan if impaired  - Assess patient's need for assistive devices and provide as appropriate  - Encourage maximum independence but intervene and supervise when necessary  - Involve family in performance of ADLs  - Assess for home care needs following discharge   - Consider OT consult to assist with ADL evaluation and planning for discharge  - Provide patient education as appropriate  Outcome: Progressing  Goal: Maintains/Returns to pre admission functional level  Description: INTERVENTIONS:  - Perform AM-PAC 6 Click Basic Mobility/ Daily Activity assessment daily.  - Set and communicate daily mobility goal to care team and patient/family/caregiver.   - Collaborate with rehabilitation services on mobility goals if consulted  - Perform Range of Motion x times a day.  - Reposition patient every x hours.  - Dangle patient x times a day  - Stand patient x times a day  - Ambulate patient x times a day  - Out of bed to chair x times a day   - Out of bed for meals xxx times a day  - Out of bed for toileting  - Record patient progress and toleration of activity level   Outcome: Progressing     Problem: DISCHARGE PLANNING  Goal: Discharge to home or other facility with appropriate resources  Description: INTERVENTIONS:  - Identify barriers to discharge w/patient and caregiver  -  Arrange for needed discharge resources and transportation as appropriate  - Identify discharge learning needs (meds, wound care, etc.)  - Arrange for interpretive services to assist at discharge as needed  - Refer to Case Management Department for coordinating discharge planning if the patient needs post-hospital services based on physician/advanced practitioner order or complex needs related to functional status, cognitive ability, or social support system  Outcome: Progressing     Problem: Knowledge Deficit  Goal: Patient/family/caregiver demonstrates understanding of disease process, treatment plan, medications, and discharge instructions  Description: Complete learning assessment and assess knowledge base.  Interventions:  - Provide teaching at level of understanding  - Provide teaching via preferred learning methods  Outcome: Progressing     Problem: Prexisting or High Potential for Compromised Skin Integrity  Goal: Skin integrity is maintained or improved  Description: INTERVENTIONS:  - Identify patients at risk for skin breakdown  - Assess and monitor skin integrity  - Assess and monitor nutrition and hydration status  - Monitor labs   - Assess for incontinence   - Turn and reposition patient  - Assist with mobility/ambulation  - Relieve pressure over bony prominences  - Avoid friction and shearing  - Provide appropriate hygiene as needed including keeping skin clean and dry  - Evaluate need for skin moisturizer/barrier cream  - Collaborate with interdisciplinary team   - Patient/family teaching  - Consider wound care consult   Outcome: Progressing     Problem: Potential for Falls  Goal: Patient will remain free of falls  Description: INTERVENTIONS:  - Educate patient/family on patient safety including physical limitations  - Instruct patient to call for assistance with activity   - Consult OT/PT to assist with strengthening/mobility   - Keep Call bell within reach  - Keep bed low and locked with side rails  adjusted as appropriate  - Keep care items and personal belongings within reach  - Initiate and maintain comfort rounds  - Make Fall Risk Sign visible to staff  - Offer Toileting every x Hours, in advance of need  - Initiate/Maintain xalarm  - Obtain necessary fall risk management equipment: x  - Apply yellow socks and bracelet for high fall risk patients  - Consider moving patient to room near nurses station  Outcome: Progressing

## 2024-11-07 NOTE — PLAN OF CARE
Problem: PAIN - ADULT  Goal: Verbalizes/displays adequate comfort level or baseline comfort level  Description: Interventions:  - Encourage patient to monitor pain and request assistance  - Assess pain using appropriate pain scale  - Administer analgesics based on type and severity of pain and evaluate response  - Implement non-pharmacological measures as appropriate and evaluate response  - Consider cultural and social influences on pain and pain management  - Notify physician/advanced practitioner if interventions unsuccessful or patient reports new pain  Outcome: Progressing     Problem: INFECTION - ADULT  Goal: Absence or prevention of progression during hospitalization  Description: INTERVENTIONS:  - Assess and monitor for signs and symptoms of infection  - Monitor lab/diagnostic results  - Monitor all insertion sites, i.e. indwelling lines, tubes, and drains  - Monitor endotracheal if appropriate and nasal secretions for changes in amount and color  - [place] appropriate cooling/warming therapies per order  - Administer medications as ordered  - Instruct and encourage patient and family to use good hand hygiene technique  - Identify and instruct in appropriate isolation precautions for identified infection/condition  Outcome: Progressing  Goal: Absence of fever/infection during neutropenic period  Description: INTERVENTIONS:  - Monitor WBC    Outcome: Progressing     Problem: SAFETY ADULT  Goal: Maintain or return to baseline ADL function  Description: INTERVENTIONS:  -  Assess patient's ability to carry out ADLs; assess patient's baseline for ADL function and identify physical deficits which impact ability to perform ADLs (bathing, care of mouth/teeth, toileting, grooming, dressing, etc.)  - Assess/evaluate cause of self-care deficits   - Assess range of motion  - Assess patient's mobility; develop plan if impaired  - Assess patient's need for assistive devices and provide as appropriate  - Encourage  maximum independence but intervene and supervise when necessary  - Involve family in performance of ADLs  - Assess for home care needs following discharge   - Consider OT consult to assist with ADL evaluation and planning for discharge  - Provide patient education as appropriate  Outcome: Progressing     Problem: Prexisting or High Potential for Compromised Skin Integrity  Goal: Skin integrity is maintained or improved  Description: INTERVENTIONS:  - Identify patients at risk for skin breakdown  - Assess and monitor skin integrity  - Assess and monitor nutrition and hydration status  - Monitor labs   - Assess for incontinence   - Turn and reposition patient  - Assist with mobility/ambulation  - Relieve pressure over bony prominences  - Avoid friction and shearing  - Provide appropriate hygiene as needed including keeping skin clean and dry  - Evaluate need for skin moisturizer/barrier cream  - Collaborate with interdisciplinary team   - Patient/family teaching  - Consider wound care consult   Outcome: Progressing     Problem: Potential for Falls  Goal: Patient will remain free of falls  Description: INTERVENTIONS:  - Educate patient/family on patient safety including physical limitations  - Instruct patient to call for assistance with activity   - Consult OT/PT to assist with strengthening/mobility   - Keep Call bell within reach  - Keep bed low and locked with side rails adjusted as appropriate  - Keep care items and personal belongings within reach  - Initiate and maintain comfort rounds  - Make Fall Risk Sign visible to staff  - Offer Toileting every 2 Hours, in advance of need  - Initiate/Maintain bed alarm  - Obtain necessary fall risk management equipment:   - Apply yellow socks and bracelet for high fall risk patients  - Consider moving patient to room near nurses station  Outcome: Progressing

## 2024-11-07 NOTE — DISCHARGE SUMMARY
Discharge Summary - Hospitalist   Name: Brianna Meyers 84 y.o. female I MRN: [id number]  Unit/Bed#: -01 I Date of Admission: 11/4/2024   Date of Service: 11/7/2024 I Hospital Day: 3     Assessment & Plan  Stroke-like symptoms  Presents from [facility] due to slurred speech and L sided facial droop.   Hx of left pontine stroke.  Admitted 8/22 through 8/26.  Had presented to the hospital with right-sided facial droop, right arm weakness and slurred speech  CT head: No acute intracranial abnormalities  CTA head and neck   No large vessel occlusion in the head or neck.  Unchanged atherosclerotic irregularity as described, with focal moderate to severe stenosis in the intradural right vertebral artery, both A2 segments of the anterior cerebral arteries, left MCA bifurcation, and P2 segment right posterior cerebral artery.  Mildly beaded contouring of the cervical ICAs which could be seen with fibromuscular dysplasia.  Aneurysmal dilatation of the proximal ascending aorta measuring 4.2 cm in diameter.  Enlarged main pulmonary artery indicating some degree of pulmonary hypertension.  Neurology evaluated her and stated to hold eliquis and continue asa/lipitor  Pts cognition continued to decline and she was unable to pass her swallow eval. Asa and lipitor were held  She continued to deteriorate and on 11/5 she was only responding to significant painful stimuli  Her  decided for comfort care. He understands that she will be kept comfortable until she passes away  Lab draws were d/yeyo and meds for comfort were continued   [facility]. Family met with Hospices service. Agreeable to plan of care.  Atrial fibrillation with rapid ventricular response (HCC)  H/o afib  Presented to the ed with heart rate 110s that was treated with cardizem 15m iv x1 with improvement  Pt later had worsened afib with rvr of 120-150 in which she was started on amiodarone.   She became hypotensive and had  acute respiratory failure in which her  ultimately decided for comfort care  Patient now comfort care, no additional monitoring indicated  Type 2 diabetes mellitus with hyperglycemia (HCC)  Lab Results   Component Value Date    HGBA1C 8.1 (H) 11/04/2024       Recent Labs     11/05/24  0008 11/05/24  0204 11/05/24  0325 11/05/24  0558   POCGLU 447* 380* 322* 250*       Blood Sugar Average: Last 72 hrs:  (P) 279.8  Currently comfort care   DANNI (acute kidney injury) (HCC)  Creatinine 1.84 on admission  Pt became hypotensive and creatinine worsened   decided for comfort care   Sepsis (HCC)  There was concern for multifocal pna.   She had been treated with ceftriaxone but despite this had worsening respiratory failure  She was started on bipap and then high flow  Her medications were broadened to cefepime and vanco  She became hypotensive and unresponsive  Her  decided for comfort care   Hypotension  Pt had been on lisinopril and amlodipine at home  Both were held   She had ongoing hypotension despite fluids and her  decided for comfort care   Hyponatremia  Sodium 130  Treated with iv fluids  Ultimately decided for comfort care   Acute respiratory failure with hypoxia (HCC)  Please see note under sepsis   End of life care  Pt had cva with worsening mentation, acute respiratory failure due to pna, afib with rvr and hypotension  Her  decided for comfort care  She was started on comfort medications and has been using iv medications   Family met with hospice liaison, will be transferred to  [facility]     Medical Problems       Resolved Problems  Date Reviewed: 11/6/2024   None       Discharging Physician / Practitioner: JAZMINE Tesfaye  PCP: Rosario Shanks DO  Admission Date:   Admission Orders (From admission, onward)       Ordered        11/04/24 2108  INPATIENT ADMISSION  Once                          Discharge Date: 11/07/24    Consultations During  Hospital Stay:  Inpatient Critical Care  Neurology  Hospice    Procedures Performed:   None    Significant Findings / Test Results:   CXR: No active pulmonary disease.   CT Stroke Alert: No large vessel occlusion in the head or neck.Unchanged atherosclerotic irregularity as described, with focal moderate to severe stenosis in the intradural right vertebral artery, both A2 segments of the anterior cerebral arteries, left MCA bifurcation, and P2 segment right posterior cerebral artery.Mildly beaded contouring of the cervical ICAs which could be seen with fibromuscular dysplasia.  Aneurysmal dilatation of the proximal ascending aorta measuring 4.2 cm in diameter.Enlarged main pulmonary artery indicating some degree of pulmonary hypertension.  CT Stroke Alert Brain: No acute intracranial abnormality.     Incidental Findings:   None    Test Results Pending at Discharge (will require follow up):   None     Outpatient Tests Requested:  None    Complications:  No    Reason for Admission:   Chief Complaint   Patient presents with    Slurred Speech     Patient reports to ED via EMS from [facility] with c/o stroke like symptoms. Per EMS, patient is slurring words and has minor facial droop. In route, BG over 500.         Hospital Course:   Brianna Meyers is a 84 y.o. female patient who originally presented to the hospital on 11/4/2024 due to rapid a-fib. Patient with history of left pontine stroke in August. Was discharged to facility, however had continued declined. Upon arrival to ED she was noted to be in rapid a-fib and treated with IV cardizem with some improvement noted. Stroke alert called at that time. Imaging was negative. Patient was meeting sepsis criteria secondary to Pneumonia. Patient with continued slurred speech on admission and did not pass dysphagia screen. During admission patient continued to declined. Unable to answer questions or follow commands. She was encephalopathic with persistent hypotension  "despite resuscitation efforts. Pneumonia continued to worsen as evident by repeat CXR. Patient was made DNR/DNI after discussion with family regarding prognosis. She was transferred to ICU for BP support for hypotension and increased work of breathing. At that time ICU personnel discussed with family how patient had high probability of  expiring. Goals of care conversations had with patient's  and family. After extensive discussions with  and family, due to patient's lack of clinical improvement she was transitioned to Comfort Care status. Hospice personnel consulted. Patient to be transitioned to  [facility]. All questions answered to family satisfaction.This is a brief summary of patient's stay. For full details please refer to attached progress notes.     Hospital Course: No notes on file      Please see above list of diagnoses and related plan for additional information.     Condition at Discharge: terminal    Discharge Day Visit / Exam:   Subjective:  patient not responsive  Vitals: Blood Pressure: 106/55 (11/05/24 0900)  Pulse: 104 (11/07/24 0300)  Temperature: 100 °F (37.8 °C) (11/05/24 0526)  Temp Source: Axillary (11/05/24 0526)  Respirations: (!) 36 (11/07/24 0300)  Height: 5' 7\" (170.2 cm) (11/05/24 0250)  Weight - Scale: 67.3 kg (148 lb 5.9 oz) (11/05/24 0250)  SpO2: 91 % (11/07/24 0911)  Physical Exam  Nursing note reviewed.   Constitutional:       Appearance: She is ill-appearing.   Pulmonary:      Effort: Accessory muscle usage and prolonged expiration present.   Abdominal:      General: There is no distension.      Palpations: Abdomen is soft.   Musculoskeletal:         General: No swelling.   Skin:     General: Skin is warm.   Neurological:      Mental Status: She is unresponsive.          Discussion with Family: Updated  ( and daughter) at bedside.    Discharge instructions/Information to patient and family:   See after visit summary for information provided " to patient and family.      Provisions for Follow-Up Care:  See after visit summary for information related to follow-up care and any pertinent home health orders.      Mobility at time of Discharge:   Basic Mobility Inpatient Raw Score: 6  -[facility] Goal: 2: Bed activities/Dependent transfer  -HL Achieved: 1: Laying in bed  Patient now hospice/comfort care     Disposition:   Other:  Hospice House    Planned Readmission: No    Discharge Medications:  See after visit summary for reconciled discharge medications provided to patient and/or family.      Administrative Statements   Discharge Statement:  I have spent a total time of 35 minutes in caring for this patient on the day of the visit/encounter. >30 minutes of time was spent on: Prognosis, Patient and family education, Counseling / Coordination of care, Documenting in the medical record, and Communicating with other healthcare professionals .    **Please Note: This note may have been constructed using a voice recognition system**

## 2024-11-07 NOTE — CASE COMMUNICATION
Request for IPU on Brianna Meyers  [birth year] 85 yo female currently at [facility]. Pt came in with stroke like sx and rapidly deteriorated. She has a hx of left pontine stroke.  Admitted  through .  Had presented to the hospital with right-sided facial droop, right arm weakness and slurred speech. CT head: No acute intracranial abnormalities. Pts cognition continued to decline and she was unable to pass her swallow eval. She developed s epsis- multifocal PNA was treated with antibiotics but went into ARF and went unresponsive, needed Bipap then HF. Spouse then elected for comfort care yesterday. No longer on 02 Min Responsive. Has been getting IV Dilaudid, IV Haldol and Ativan for comfort. PPS 10 Approve IPU?    Approved for IPU by Dr Guzman. 11.6.24 Acute CVA

## 2024-11-07 NOTE — HOSPICE NOTE
Met with pts spouse Naeem and son again today. They are in agreement with pt going to the hospice house. They state their number one priority is her comfort. They understand that the hospice house staff will medicate pt to comfort.Consents signed. Copies to spouse and OOH DNR to CM for transport. Please call report at least 30 min prior to pt leaving. Please give pt her comfort meds for transport.

## 2024-11-07 NOTE — ASSESSMENT & PLAN NOTE
H/o afib  Presented to the ed with heart rate 110s that was treated with cardizem 15m iv x1 with improvement  Pt later had worsened afib with rvr of 120-150 in which she was started on amiodarone.   She became hypotensive and had acute respiratory failure in which her  ultimately decided for comfort care  Patient now comfort care, no additional monitoring indicated

## 2024-11-07 NOTE — CASE MANAGEMENT
Case Management Progress Note    Patient name Brianna Meyers  Location /-01 MRN [id number]  : 1940 Date 2024       LOS (days): 3  Geometric Mean LOS (GMLOS) (days): 4.9  Days to GMLOS:2.4        OBJECTIVE:        Current admission status: Inpatient  Preferred Pharmacy:   UNKNOWN - FOLLOW UP PRIOR TO DISCHARGE TO E-PRESCRIBE  No address on file      Primary Care Provider: Rosario Shanks DO    Primary Insurance: [id number]  Secondary Insurance: [facility]    PROGRESS NOTE: Estevan spoke with pts spouse this morning. He states that after meeting with his sons he would like for the pt to dc to  [facility]ospice House. Cm follow up with Beverly from hospice on this decision and whether there is a bed for today

## 2024-11-07 NOTE — ASSESSMENT & PLAN NOTE
Pt had cva with worsening mentation, acute respiratory failure due to pna, afib with rvr and hypotension  Her  decided for comfort care  She was started on comfort medications and has been using iv medications   Family met with hospice liaison, will be transferred to  [facility]

## 2024-11-07 NOTE — ASSESSMENT & PLAN NOTE
Presents from [facility] due to slurred speech and L sided facial droop.   Hx of left pontine stroke.  Admitted 8/22 through 8/26.  Had presented to the hospital with right-sided facial droop, right arm weakness and slurred speech  CT head: No acute intracranial abnormalities  CTA head and neck   No large vessel occlusion in the head or neck.  Unchanged atherosclerotic irregularity as described, with focal moderate to severe stenosis in the intradural right vertebral artery, both A2 segments of the anterior cerebral arteries, left MCA bifurcation, and P2 segment right posterior cerebral artery.  Mildly beaded contouring of the cervical ICAs which could be seen with fibromuscular dysplasia.  Aneurysmal dilatation of the proximal ascending aorta measuring 4.2 cm in diameter.  Enlarged main pulmonary artery indicating some degree of pulmonary hypertension.  Neurology evaluated her and stated to hold eliquis and continue asa/lipitor  Pts cognition continued to decline and she was unable to pass her swallow eval. Asa and lipitor were held  She continued to deteriorate and on 11/5 she was only responding to significant painful stimuli  Her  decided for comfort care. He understands that she will be kept comfortable until she passes away  Lab draws were d/yeyo and meds for comfort were continued   Patient to be discharged to  [facility]. Family met with Hospices service. Agreeable to plan of care.

## 2024-11-08 NOTE — UTILIZATION REVIEW
NOTIFICATION OF ADMISSION DISCHARGE   This is a Notification of Discharge from [facility]. Please be advised that this patient has been discharge from our facility. Below you will find the admission and discharge date and time including the patient’s disposition.   UTILIZATION REVIEW CONTACT:  Bhavna Mariscal  Utilization   Network Utilization Review Department  Phone: [phone] x carefully listen to the prompts. All voicemails are confidential.  Email: [email or web address]     ADMISSION INFORMATION  PRESENTATION DATE: 11/4/2024  7:35 PM  OBERVATION ADMISSION DATE: N/A  INPATIENT ADMISSION DATE: 11/4/24  9:08 PM   DISCHARGE DATE: 11/7/2024  1:21 PM   DISPOSITION:[facility]/Swing Bed    Network Utilization Review Department  ATTENTION: Please call with any questions or concerns to [phone] and carefully listen to the prompts so that you are directed to the right person. All voicemails are confidential.   For Discharge needs, contact Care Management DC Support Team at [phone] opt. 2  Send all requests for admission clinical reviews, approved or denied determinations and any other requests to dedicated fax number below belonging to the campus where the patient is receiving treatment. List of dedicated fax numbers for the Facilities:  FACILITY NAME UR FAX NUMBER   ADMISSION DENIALS (Administrative/Medical Necessity) [phone]   DISCHARGE SUPPORT TEAM ([facility]) [phone]   PARENT CHILD HEALTH (Maternity/NICU/Pediatrics) [phone]   [facility] [phone]   [facility] [phone]   [facility] [phone]   [facility] [phone]   Critical access hospital [phone]   [facility] [phone]   [facility] [phone]   [facility]  [place] [phone]   [facility] [phone]   [facility] [phone]   General acute hospital [phone]   [facility] [phone]

## 2024-11-10 LAB
BACTERIA BLD CULT: NORMAL
BACTERIA BLD CULT: NORMAL

## 2024-11-15 ENCOUNTER — HOME CARE VISIT (OUTPATIENT)
Dept: HOME HOSPICE | Facility: HOSPICE | Age: 84
End: 2024-11-15
Payer: MEDICARE

## 2024-11-15 NOTE — CASE COMMUNICATION
"Brianna Meyers, Bereavement Final IDG [id number] ([id number]) Due: 24   : 1940  SOC: 24  DOD: 24  Diagnosis: Stroke    Brianna was an 84 year old woman at the IPU.  of 59 years is Naeem. 3 sons: Kane (wife Cora), Misael, and Art. 5 grandchildren.  Brianna was described as very fashionable with her hair and make-up always done. Naeem reportedly has \"tremendous mono\" and lots of support.      TOD: Naeem and Cora were present at  TOD and appreciative of the care from the [facility]osp[facility].     Call Assignments:  Chaplain Moreira to reassess  Naeem Meyers and assess son Kane Meyers at LR. (Due: 24)  MERCEDEZ Ch to assess sons Misael  and Yrn [place] at LR. BC to request Yrn's address for bereavement follow-up, as able. (Due: 24)  "